# Patient Record
Sex: MALE | Race: ASIAN | NOT HISPANIC OR LATINO | URBAN - METROPOLITAN AREA
[De-identification: names, ages, dates, MRNs, and addresses within clinical notes are randomized per-mention and may not be internally consistent; named-entity substitution may affect disease eponyms.]

---

## 2023-07-14 ENCOUNTER — EMERGENCY (EMERGENCY)
Facility: HOSPITAL | Age: 48
LOS: 1 days | Discharge: HOME | End: 2023-07-14
Attending: STUDENT IN AN ORGANIZED HEALTH CARE EDUCATION/TRAINING PROGRAM
Payer: COMMERCIAL

## 2023-07-14 VITALS
TEMPERATURE: 99 F | SYSTOLIC BLOOD PRESSURE: 118 MMHG | RESPIRATION RATE: 20 BRPM | HEIGHT: 70 IN | DIASTOLIC BLOOD PRESSURE: 87 MMHG | WEIGHT: 179.9 LBS | HEART RATE: 102 BPM | OXYGEN SATURATION: 98 %

## 2023-07-14 VITALS
TEMPERATURE: 98 F | RESPIRATION RATE: 18 BRPM | HEART RATE: 91 BPM | DIASTOLIC BLOOD PRESSURE: 64 MMHG | SYSTOLIC BLOOD PRESSURE: 112 MMHG | OXYGEN SATURATION: 97 %

## 2023-07-14 LAB
ACETONE SERPL-MCNC: NEGATIVE — SIGNIFICANT CHANGE UP
ALBUMIN SERPL ELPH-MCNC: 3.9 G/DL — SIGNIFICANT CHANGE UP (ref 3.3–5)
ALP SERPL-CCNC: 49 U/L — SIGNIFICANT CHANGE UP (ref 30–120)
ALT FLD-CCNC: 41 U/L DA — SIGNIFICANT CHANGE UP (ref 10–60)
ANION GAP SERPL CALC-SCNC: 15 MMOL/L — SIGNIFICANT CHANGE UP (ref 5–17)
APPEARANCE UR: CLEAR — SIGNIFICANT CHANGE UP
AST SERPL-CCNC: 52 U/L — HIGH (ref 10–40)
BACTERIA # UR AUTO: ABNORMAL /HPF
BASOPHILS # BLD AUTO: 0 K/UL — SIGNIFICANT CHANGE UP (ref 0–0.2)
BASOPHILS NFR BLD AUTO: 0 % — SIGNIFICANT CHANGE UP (ref 0–2)
BILIRUB SERPL-MCNC: 1.9 MG/DL — HIGH (ref 0.2–1.2)
BILIRUB UR-MCNC: ABNORMAL
BUN SERPL-MCNC: 16 MG/DL — SIGNIFICANT CHANGE UP (ref 7–23)
CALCIUM SERPL-MCNC: 9.3 MG/DL — SIGNIFICANT CHANGE UP (ref 8.4–10.5)
CHLORIDE SERPL-SCNC: 96 MMOL/L — SIGNIFICANT CHANGE UP (ref 96–108)
CO2 SERPL-SCNC: 24 MMOL/L — SIGNIFICANT CHANGE UP (ref 22–31)
COLOR SPEC: SIGNIFICANT CHANGE UP
COMMENT - URINE: SIGNIFICANT CHANGE UP
CREAT SERPL-MCNC: 1.11 MG/DL — SIGNIFICANT CHANGE UP (ref 0.5–1.3)
DIFF PNL FLD: NEGATIVE — SIGNIFICANT CHANGE UP
EGFR: 81 ML/MIN/1.73M2 — SIGNIFICANT CHANGE UP
EOSINOPHIL # BLD AUTO: 0 K/UL — SIGNIFICANT CHANGE UP (ref 0–0.5)
EOSINOPHIL NFR BLD AUTO: 0 % — SIGNIFICANT CHANGE UP (ref 0–6)
EPI CELLS # UR: PRESENT
GLUCOSE SERPL-MCNC: 221 MG/DL — HIGH (ref 70–99)
GLUCOSE UR QL: >=1000 MG/DL
HCT VFR BLD CALC: 48.1 % — SIGNIFICANT CHANGE UP (ref 39–50)
HGB BLD-MCNC: 16.8 G/DL — SIGNIFICANT CHANGE UP (ref 13–17)
KETONES UR-MCNC: 40 MG/DL
LEUKOCYTE ESTERASE UR-ACNC: NEGATIVE — SIGNIFICANT CHANGE UP
LIDOCAIN IGE QN: 31 U/L — LOW (ref 73–393)
LYMPHOCYTES # BLD AUTO: 2.32 K/UL — SIGNIFICANT CHANGE UP (ref 1–3.3)
LYMPHOCYTES # BLD AUTO: 24 % — SIGNIFICANT CHANGE UP (ref 13–44)
MAGNESIUM SERPL-MCNC: 2.1 MG/DL — SIGNIFICANT CHANGE UP (ref 1.6–2.6)
MANUAL SMEAR VERIFICATION: SIGNIFICANT CHANGE UP
MCHC RBC-ENTMCNC: 29.8 PG — SIGNIFICANT CHANGE UP (ref 27–34)
MCHC RBC-ENTMCNC: 34.9 GM/DL — SIGNIFICANT CHANGE UP (ref 32–36)
MCV RBC AUTO: 85.4 FL — SIGNIFICANT CHANGE UP (ref 80–100)
MONOCYTES # BLD AUTO: 0.29 K/UL — SIGNIFICANT CHANGE UP (ref 0–0.9)
MONOCYTES NFR BLD AUTO: 3 % — SIGNIFICANT CHANGE UP (ref 2–14)
NEUTROPHILS # BLD AUTO: 7.06 K/UL — SIGNIFICANT CHANGE UP (ref 1.8–7.4)
NEUTROPHILS NFR BLD AUTO: 72 % — SIGNIFICANT CHANGE UP (ref 43–77)
NEUTS BAND # BLD: 1 % — SIGNIFICANT CHANGE UP (ref 0–8)
NITRITE UR-MCNC: NEGATIVE — SIGNIFICANT CHANGE UP
NRBC # BLD: 0 — SIGNIFICANT CHANGE UP
NRBC # BLD: SIGNIFICANT CHANGE UP /100 WBCS (ref 0–0)
PH UR: 5.5 — SIGNIFICANT CHANGE UP (ref 5–8)
PLAT MORPH BLD: NORMAL — SIGNIFICANT CHANGE UP
PLATELET # BLD AUTO: 206 K/UL — SIGNIFICANT CHANGE UP (ref 150–400)
PLATELET COUNT - ESTIMATE: NORMAL — SIGNIFICANT CHANGE UP
POTASSIUM SERPL-MCNC: 3.1 MMOL/L — LOW (ref 3.5–5.3)
POTASSIUM SERPL-SCNC: 3.1 MMOL/L — LOW (ref 3.5–5.3)
PROT SERPL-MCNC: 8.2 G/DL — SIGNIFICANT CHANGE UP (ref 6–8.3)
PROT UR-MCNC: 30 MG/DL
RBC # BLD: 5.63 M/UL — SIGNIFICANT CHANGE UP (ref 4.2–5.8)
RBC # FLD: 12.2 % — SIGNIFICANT CHANGE UP (ref 10.3–14.5)
RBC BLD AUTO: NORMAL — SIGNIFICANT CHANGE UP
RBC CASTS # UR COMP ASSIST: 1 /HPF — SIGNIFICANT CHANGE UP (ref 0–4)
SODIUM SERPL-SCNC: 135 MMOL/L — SIGNIFICANT CHANGE UP (ref 135–145)
SP GR SPEC: 1.04 — HIGH (ref 1–1.03)
TROPONIN I, HIGH SENSITIVITY RESULT: 15.5 NG/L — SIGNIFICANT CHANGE UP
UROBILINOGEN FLD QL: 1 MG/DL — SIGNIFICANT CHANGE UP (ref 0.2–1)
WBC # BLD: 9.67 K/UL — SIGNIFICANT CHANGE UP (ref 3.8–10.5)
WBC # FLD AUTO: 9.67 K/UL — SIGNIFICANT CHANGE UP (ref 3.8–10.5)
WBC UR QL: 2 /HPF — SIGNIFICANT CHANGE UP (ref 0–5)

## 2023-07-14 PROCEDURE — 93010 ELECTROCARDIOGRAM REPORT: CPT

## 2023-07-14 PROCEDURE — 84484 ASSAY OF TROPONIN QUANT: CPT

## 2023-07-14 PROCEDURE — 81001 URINALYSIS AUTO W/SCOPE: CPT

## 2023-07-14 PROCEDURE — 82803 BLOOD GASES ANY COMBINATION: CPT

## 2023-07-14 PROCEDURE — 99285 EMERGENCY DEPT VISIT HI MDM: CPT | Mod: 25

## 2023-07-14 PROCEDURE — 36415 COLL VENOUS BLD VENIPUNCTURE: CPT

## 2023-07-14 PROCEDURE — 71045 X-RAY EXAM CHEST 1 VIEW: CPT | Mod: 26

## 2023-07-14 PROCEDURE — 83690 ASSAY OF LIPASE: CPT

## 2023-07-14 PROCEDURE — 93005 ELECTROCARDIOGRAM TRACING: CPT

## 2023-07-14 PROCEDURE — 85025 COMPLETE CBC W/AUTO DIFF WBC: CPT

## 2023-07-14 PROCEDURE — 82009 KETONE BODYS QUAL: CPT

## 2023-07-14 PROCEDURE — 96374 THER/PROPH/DIAG INJ IV PUSH: CPT

## 2023-07-14 PROCEDURE — 99285 EMERGENCY DEPT VISIT HI MDM: CPT

## 2023-07-14 PROCEDURE — 71045 X-RAY EXAM CHEST 1 VIEW: CPT

## 2023-07-14 PROCEDURE — 80053 COMPREHEN METABOLIC PANEL: CPT

## 2023-07-14 PROCEDURE — 82962 GLUCOSE BLOOD TEST: CPT

## 2023-07-14 PROCEDURE — 83735 ASSAY OF MAGNESIUM: CPT

## 2023-07-14 RX ORDER — POTASSIUM CHLORIDE 20 MEQ
40 PACKET (EA) ORAL ONCE
Refills: 0 | Status: COMPLETED | OUTPATIENT
Start: 2023-07-14 | End: 2023-07-14

## 2023-07-14 RX ORDER — ONDANSETRON 8 MG/1
4 TABLET, FILM COATED ORAL ONCE
Refills: 0 | Status: COMPLETED | OUTPATIENT
Start: 2023-07-14 | End: 2023-07-14

## 2023-07-14 RX ORDER — SODIUM CHLORIDE 9 MG/ML
1000 INJECTION INTRAMUSCULAR; INTRAVENOUS; SUBCUTANEOUS ONCE
Refills: 0 | Status: COMPLETED | OUTPATIENT
Start: 2023-07-14 | End: 2023-07-14

## 2023-07-14 RX ADMIN — SODIUM CHLORIDE 1000 MILLILITER(S): 9 INJECTION INTRAMUSCULAR; INTRAVENOUS; SUBCUTANEOUS at 19:30

## 2023-07-14 RX ADMIN — Medication 40 MILLIEQUIVALENT(S): at 19:59

## 2023-07-14 RX ADMIN — ONDANSETRON 4 MILLIGRAM(S): 8 TABLET, FILM COATED ORAL at 18:51

## 2023-07-14 RX ADMIN — SODIUM CHLORIDE 1000 MILLILITER(S): 9 INJECTION INTRAMUSCULAR; INTRAVENOUS; SUBCUTANEOUS at 18:50

## 2023-07-14 NOTE — ED PROVIDER NOTE - CARE PROVIDER_API CALL
JULIA WOMACK  [address] [place] [address]  Phone: [phone]  Fax: [phone]  Follow Up Time: 1-3 Days

## 2023-07-14 NOTE — ED PROVIDER NOTE - NS ED ATTENDING STATEMENT MOD
This was a shared visit with the JOCY. I reviewed and verified the documentation and independently performed the documented:

## 2023-07-14 NOTE — ED PROVIDER NOTE - NSFOLLOWUPINSTRUCTIONS_ED_ALL_ED_FT
Drink plenty of fluids  Take insulin as previously prescribed  Have close follow-up with primary care doctor    Hyperglycemia  Hyperglycemia is when the sugar (glucose) level in your blood is too high. High blood sugar can happen to people who have or do not have diabetes. High blood sugar can happen quickly. It can be an emergency.    What are the causes?  If you have diabetes, high blood sugar may be caused by:  Medicines that increase blood sugar or affect your control of diabetes.  Getting less physical activity.  Overeating.  Being sick or injured or having an infection.  Having surgery.  Stress.  Not giving yourself enough insulin (if you are taking it).  You may have high blood sugar because you have diabetes that has not been diagnosed yet.    If you do not have diabetes, high blood sugar may be caused by:  Certain medicines.  Stress.  A bad illness.  An infection.  Having surgery.  Diseases of the pancreas.  What increases the risk?  This condition is more likely to develop in people who have risk factors for diabetes, such as:  Having a family member with diabetes.  Certain conditions in which the body's defense system (immune system) attacks itself. These are called autoimmune disorders.  Being overweight.  Not being active.  Having a condition called insulin resistance.  Having a history of:  Prediabetes.  Diabetes when pregnant.  Polycystic ovarian syndrome (PCOS).  What are the signs or symptoms?  This condition may not cause symptoms. If you do have symptoms, they may include:  Feeling more thirsty than normal.  Needing to pee (urinate) more often than normal.  Hunger.  Feeling very tired.  Blurry eyesight (vision).  You may get other symptoms as the condition gets worse, such as:  Dry mouth.  Pain in your belly (abdomen).  Not being hungry (loss of appetite).  Breath that smells fruity.  Weakness.  Weight loss that is not planned.  A tingling or numb feeling in your hands or feet.  A headache.  Cuts or bruises that heal slowly.  How is this treated?  Treatment depends on the cause of your condition. Treatment may include:  Taking medicine to control your blood sugar levels.  Changing your medicine or dosage if you take insulin or other diabetes medicines.  Lifestyle changes. These may include:  Exercising more.  Eating healthier foods.  Losing weight.  Treating an illness or infection.  Checking your blood sugar more often.  Stopping or reducing steroid medicines.  If your condition gets very bad, you will need to be treated in the hospital.    Follow these instructions at home:  General instructions    Take over-the-counter and prescription medicines only as told by your doctor.  Do not smoke or use any products that contain nicotine or tobacco. If you need help quitting, ask your doctor.  If you drink alcohol:  Limit how much you have to:  0–1 drink a day for women who are not pregnant.  0–2 drinks a day for men.  Know how much alcohol is in a drink. In the [place] one drink equals one 12 oz bottle of beer (355 mL), one 5 oz glass of wine (148 mL), or one 1½ oz glass of hard liquor (44 mL).  Manage stress. If you need help with this, ask your doctor.  Do exercises as told by your doctor.  Keep all follow-up visits.  Eating and drinking      Stay at a healthy weight.  Make sure you drink enough fluid when you:  Exercise.  Get sick.  Are in hot temperatures.  Drink enough fluid to keep your pee (urine) pale yellow.  If you have diabetes:      Know the symptoms of high blood sugar.  Follow your diabetes management plan as told by your doctor. Make sure you:  Take insulin and medicines as told.  Follow your exercise plan.  Follow your meal plan. Eat on time. Do not skip meals.  Check your blood sugar as often as told. Make sure you check before and after exercise. If you exercise longer or in a different way, check your blood sugar more often.  Follow your sick day plan whenever you cannot eat or drink normally. Make this plan ahead of time with your doctor.  Share your diabetes management plan with people in your workplace, school, and household.  Check your pee for ketones when you are ill and as told by your doctor.  Carry a card or wear jewelry that says that you have diabetes.  Where to find more information  American Diabetes Association: www.diabetes.org    Contact a doctor if:  Your blood sugar level is at or above 240 mg/dL (13.3 mmol/L) for 2 days in a row.  You have problems keeping your blood sugar in your target range.  You have high blood pressure often.  You have signs of illness, such as:  Feeling like you may vomit (feeling nauseous).  Vomiting.  A fever.  Get help right away if:  Your blood sugar monitor reads "high" even when you are taking insulin.  You have trouble breathing.  You have a change in how you think, feel, or act (mental status).  You feel like you may vomit, and the feeling does not go away.  You cannot stop vomiting.  These symptoms may be an emergency. Get medical help right away. Call your local emergency services (911 in the [place]  Do not wait to see if the symptoms will go away.  Do not drive yourself to the hospital.  Summary  Hyperglycemia is when the sugar (glucose) level in your blood is too high.  High blood sugar can happen to people who have or do not have diabetes.  Make sure you drink enough fluids and follow your meal plan. Exercise as often as told by your doctor.  Contact your doctor if you have problems keeping your blood sugar in your target range.  This information is not intended to replace advice given to you by your health care provider. Make sure you discuss any questions you have with your health care provider.

## 2023-07-14 NOTE — ED PROVIDER NOTE - CLINICAL SUMMARY MEDICAL DECISION MAKING FREE TEXT BOX
I, Prasanth Mendoza MD, have seen and examined the patient on the date of service.  I agree with the JOCY's assessment as written, with exceptions or additions as noted below or in a separate note.  Patient with past medical history of insulin-dependent diabetes, diabetes type 2 is presenting with concern for hyperglycemia and overall not feeling well.  States that he stopped taking his insulin for the past couple weeks as he thought his sugars were better controlled.  Took Lantus today prior to arrival.  On exam he has generalized abdominal tenderness but no focal area of tenderness.  Concern for DKA versus hyperosmolar hyperglycemic nonketotic syndrome.  Possible hyperglycemia without other syndrome.  Will check troponin for atypical ACS.  Less likely pancreatitis or biliary pathology.  Plan on lab work, imaging, IV fluids

## 2023-07-14 NOTE — ED ADULT NURSE NOTE - OBJECTIVE STATEMENT
umbilical pain and nausea and vomiting x 1 today. denies fever ro chills  c/o loss of appetite. does not know meds states gets at [facility] but called but no record of him being there

## 2023-07-14 NOTE — ED PROVIDER NOTE - PROGRESS NOTE DETAILS
Patient stable.  Overall symptoms much improved.  Nausea resolved.  No abdominal tenderness on repeat exam. no chest pain.   Taking p.o. fluids.  Potassium 3.1, given oral potassium in emergency room.  No evidence of DKA.  Discussed with patient will need to restart taking insulin as prescribed.  States he has insulin at home.  Will have close follow-up with primary care doctor.

## 2023-07-14 NOTE — ED PROVIDER NOTE - PATIENT PORTAL LINK FT
You can access the FollowMyHealth Patient Portal offered by [facility] by registering at the following website: [email or web address]. By joining [facility]’s FollowMyHealth portal, you will also be able to view your health information using other applications (apps) compatible with our system.

## 2023-07-14 NOTE — ED PROVIDER NOTE - EKG ADDITIONAL INFORMATION FREE TEXT
My personal interpretation is rate 100, normal sinus rhythm, leftward axis, QTc 474, no ST segment elevation.

## 2023-07-14 NOTE — ED PROVIDER NOTE - OBJECTIVE STATEMENT
48-year-old male with history of type 2 diabetes, insulin-dependent presents emergency room with some abdominal discomfort, nausea, and vomiting.  Patient states he stopped his insulin 2 weeks ago because he thought his sugars were under control and did not need his medication anymore.  Started to develop some abdominal discomfort last night with some nausea today.  Vomited 1 time today.  Slight chest discomfort since this morning.  No shortness of breath.  No fevers or recent illnesses.  No urinary complaints or flank pain. patient concerned about DKA. no history of DKA in the past   PCP Ma ([place])

## 2023-07-14 NOTE — ED ADULT NURSE NOTE - NSFALLUNIVINTERV_ED_ALL_ED
Bed/Stretcher in lowest position, wheels locked, appropriate side rails in place/Call bell, personal items and telephone in reach/Instruct patient to call for assistance before getting out of bed/chair/stretcher/Non-slip footwear applied when patient is off stretcher/[place] to call system/Physically safe environment - no spills, clutter or unnecessary equipment/Purposeful proactive rounding/Room/bathroom lighting operational, light cord in reach

## 2023-07-14 NOTE — ED PROVIDER NOTE - DIFFERENTIAL DIAGNOSIS
Differential Diagnosis Differentials include but not limited to hyperglycemia, DKA, electrolyte abnormality, dehydration, pancreatitis, ACS, infection

## 2023-07-14 NOTE — ED ADULT TRIAGE NOTE - CHIEF COMPLAINT QUOTE
" My blood sugar is high, I have stomach pain, I am very nauseous, I have been vomiting, I forgot to take my Insulin x 2 weeks "

## 2023-07-15 ENCOUNTER — INPATIENT (INPATIENT)
Facility: HOSPITAL | Age: 48
LOS: 11 days | Discharge: HOME | DRG: 389 | End: 2023-07-27
Attending: FAMILY MEDICINE | Admitting: FAMILY MEDICINE
Payer: COMMERCIAL

## 2023-07-15 VITALS
RESPIRATION RATE: 18 BRPM | SYSTOLIC BLOOD PRESSURE: 104 MMHG | WEIGHT: 179.9 LBS | TEMPERATURE: 101 F | OXYGEN SATURATION: 99 % | DIASTOLIC BLOOD PRESSURE: 72 MMHG | HEART RATE: 100 BPM | HEIGHT: 70 IN

## 2023-07-15 DIAGNOSIS — K52.9 NONINFECTIVE GASTROENTERITIS AND COLITIS, UNSPECIFIED: ICD-10-CM

## 2023-07-15 LAB
ALBUMIN SERPL ELPH-MCNC: 2.9 G/DL — LOW (ref 3.3–5)
ALP SERPL-CCNC: 34 U/L — SIGNIFICANT CHANGE UP (ref 30–120)
ALT FLD-CCNC: 31 U/L DA — SIGNIFICANT CHANGE UP (ref 10–60)
ANION GAP SERPL CALC-SCNC: 13 MMOL/L — SIGNIFICANT CHANGE UP (ref 5–17)
APPEARANCE UR: CLEAR — SIGNIFICANT CHANGE UP
AST SERPL-CCNC: 36 U/L — SIGNIFICANT CHANGE UP (ref 10–40)
BACTERIA # UR AUTO: ABNORMAL /HPF
BASOPHILS # BLD AUTO: 0 K/UL — SIGNIFICANT CHANGE UP (ref 0–0.2)
BASOPHILS # BLD AUTO: 0 K/UL — SIGNIFICANT CHANGE UP (ref 0–0.2)
BASOPHILS NFR BLD AUTO: 0 % — SIGNIFICANT CHANGE UP (ref 0–2)
BASOPHILS NFR BLD AUTO: 0 % — SIGNIFICANT CHANGE UP (ref 0–2)
BILIRUB SERPL-MCNC: 1.3 MG/DL — HIGH (ref 0.2–1.2)
BILIRUB UR-MCNC: NEGATIVE — SIGNIFICANT CHANGE UP
BUN SERPL-MCNC: 16 MG/DL — SIGNIFICANT CHANGE UP (ref 7–23)
CALCIUM SERPL-MCNC: 8.5 MG/DL — SIGNIFICANT CHANGE UP (ref 8.4–10.5)
CHLORIDE SERPL-SCNC: 101 MMOL/L — SIGNIFICANT CHANGE UP (ref 96–108)
CO2 SERPL-SCNC: 23 MMOL/L — SIGNIFICANT CHANGE UP (ref 22–31)
COLOR SPEC: YELLOW — SIGNIFICANT CHANGE UP
COMMENT - URINE: SIGNIFICANT CHANGE UP
CREAT SERPL-MCNC: 0.79 MG/DL — SIGNIFICANT CHANGE UP (ref 0.5–1.3)
DIFF PNL FLD: NEGATIVE — SIGNIFICANT CHANGE UP
EGFR: 109 ML/MIN/1.73M2 — SIGNIFICANT CHANGE UP
EOSINOPHIL # BLD AUTO: 0 K/UL — SIGNIFICANT CHANGE UP (ref 0–0.5)
EOSINOPHIL # BLD AUTO: 0 K/UL — SIGNIFICANT CHANGE UP (ref 0–0.5)
EOSINOPHIL NFR BLD AUTO: 0 % — SIGNIFICANT CHANGE UP (ref 0–6)
EOSINOPHIL NFR BLD AUTO: 0 % — SIGNIFICANT CHANGE UP (ref 0–6)
EPI CELLS # UR: PRESENT
GLUCOSE SERPL-MCNC: 188 MG/DL — HIGH (ref 70–99)
GLUCOSE UR QL: >=1000 MG/DL
HCT VFR BLD CALC: 35.3 % — LOW (ref 39–50)
HCT VFR BLD CALC: 44.5 % — SIGNIFICANT CHANGE UP (ref 39–50)
HGB BLD-MCNC: 12.4 G/DL — LOW (ref 13–17)
HGB BLD-MCNC: 15.7 G/DL — SIGNIFICANT CHANGE UP (ref 13–17)
KETONES UR-MCNC: 80 MG/DL
LACTATE SERPL-SCNC: 1.6 MMOL/L — SIGNIFICANT CHANGE UP (ref 0.7–2)
LACTATE SERPL-SCNC: 2.4 MMOL/L — HIGH (ref 0.7–2)
LEUKOCYTE ESTERASE UR-ACNC: NEGATIVE — SIGNIFICANT CHANGE UP
LYMPHOCYTES # BLD AUTO: 1.35 K/UL — SIGNIFICANT CHANGE UP (ref 1–3.3)
LYMPHOCYTES # BLD AUTO: 1.6 K/UL — SIGNIFICANT CHANGE UP (ref 1–3.3)
LYMPHOCYTES # BLD AUTO: 34 % — SIGNIFICANT CHANGE UP (ref 13–44)
LYMPHOCYTES # BLD AUTO: 39 % — SIGNIFICANT CHANGE UP (ref 13–44)
MANUAL SMEAR VERIFICATION: SIGNIFICANT CHANGE UP
MANUAL SMEAR VERIFICATION: SIGNIFICANT CHANGE UP
MCHC RBC-ENTMCNC: 30.6 PG — SIGNIFICANT CHANGE UP (ref 27–34)
MCHC RBC-ENTMCNC: 30.7 PG — SIGNIFICANT CHANGE UP (ref 27–34)
MCHC RBC-ENTMCNC: 35.1 GM/DL — SIGNIFICANT CHANGE UP (ref 32–36)
MCHC RBC-ENTMCNC: 35.3 GM/DL — SIGNIFICANT CHANGE UP (ref 32–36)
MCV RBC AUTO: 86.7 FL — SIGNIFICANT CHANGE UP (ref 80–100)
MCV RBC AUTO: 87.4 FL — SIGNIFICANT CHANGE UP (ref 80–100)
MONOCYTES # BLD AUTO: 0.28 K/UL — SIGNIFICANT CHANGE UP (ref 0–0.9)
MONOCYTES # BLD AUTO: 0.28 K/UL — SIGNIFICANT CHANGE UP (ref 0–0.9)
MONOCYTES NFR BLD AUTO: 6 % — SIGNIFICANT CHANGE UP (ref 2–14)
MONOCYTES NFR BLD AUTO: 8 % — SIGNIFICANT CHANGE UP (ref 2–14)
NEUTROPHILS # BLD AUTO: 1.83 K/UL — SIGNIFICANT CHANGE UP (ref 1.8–7.4)
NEUTROPHILS # BLD AUTO: 2.77 K/UL — SIGNIFICANT CHANGE UP (ref 1.8–7.4)
NEUTROPHILS NFR BLD AUTO: 28 % — LOW (ref 43–77)
NEUTROPHILS NFR BLD AUTO: 41 % — LOW (ref 43–77)
NEUTS BAND # BLD: 18 % — HIGH (ref 0–8)
NEUTS BAND # BLD: 25 % — HIGH (ref 0–8)
NITRITE UR-MCNC: NEGATIVE — SIGNIFICANT CHANGE UP
NRBC # BLD: 0 — SIGNIFICANT CHANGE UP
NRBC # BLD: 0 — SIGNIFICANT CHANGE UP
PH UR: 5 — SIGNIFICANT CHANGE UP (ref 5–8)
PLAT MORPH BLD: NORMAL — SIGNIFICANT CHANGE UP
PLAT MORPH BLD: NORMAL — SIGNIFICANT CHANGE UP
PLATELET # BLD AUTO: 156 K/UL — SIGNIFICANT CHANGE UP (ref 150–400)
PLATELET # BLD AUTO: 193 K/UL — SIGNIFICANT CHANGE UP (ref 150–400)
POTASSIUM SERPL-MCNC: 3.4 MMOL/L — LOW (ref 3.5–5.3)
POTASSIUM SERPL-SCNC: 3.4 MMOL/L — LOW (ref 3.5–5.3)
PROT SERPL-MCNC: 6.5 G/DL — SIGNIFICANT CHANGE UP (ref 6–8.3)
PROT UR-MCNC: 30 MG/DL
RAPID RVP RESULT: SIGNIFICANT CHANGE UP
RBC # BLD: 4.04 M/UL — LOW (ref 4.2–5.8)
RBC # BLD: 5.13 M/UL — SIGNIFICANT CHANGE UP (ref 4.2–5.8)
RBC # FLD: 12.2 % — SIGNIFICANT CHANGE UP (ref 10.3–14.5)
RBC # FLD: 12.3 % — SIGNIFICANT CHANGE UP (ref 10.3–14.5)
RBC BLD AUTO: NORMAL — SIGNIFICANT CHANGE UP
RBC BLD AUTO: NORMAL — SIGNIFICANT CHANGE UP
RBC CASTS # UR COMP ASSIST: 1 /HPF — SIGNIFICANT CHANGE UP (ref 0–4)
SARS-COV-2 RNA SPEC QL NAA+PROBE: SIGNIFICANT CHANGE UP
SODIUM SERPL-SCNC: 137 MMOL/L — SIGNIFICANT CHANGE UP (ref 135–145)
SP GR SPEC: 1.08 — HIGH (ref 1–1.03)
UROBILINOGEN FLD QL: 1 MG/DL — SIGNIFICANT CHANGE UP (ref 0.2–1)
VARIANT LYMPHS # BLD: 1 % — SIGNIFICANT CHANGE UP (ref 0–6)
WBC # BLD: 3.45 K/UL — LOW (ref 3.8–10.5)
WBC # BLD: 4.7 K/UL — SIGNIFICANT CHANGE UP (ref 3.8–10.5)
WBC # FLD AUTO: 3.45 K/UL — LOW (ref 3.8–10.5)
WBC # FLD AUTO: 4.7 K/UL — SIGNIFICANT CHANGE UP (ref 3.8–10.5)
WBC UR QL: 1 /HPF — SIGNIFICANT CHANGE UP (ref 0–5)

## 2023-07-15 PROCEDURE — 71045 X-RAY EXAM CHEST 1 VIEW: CPT | Mod: 26

## 2023-07-15 PROCEDURE — 99223 1ST HOSP IP/OBS HIGH 75: CPT

## 2023-07-15 PROCEDURE — 99285 EMERGENCY DEPT VISIT HI MDM: CPT

## 2023-07-15 PROCEDURE — 93010 ELECTROCARDIOGRAM REPORT: CPT

## 2023-07-15 PROCEDURE — 74177 CT ABD & PELVIS W/CONTRAST: CPT | Mod: 26,MA

## 2023-07-15 RX ORDER — SODIUM CHLORIDE 9 MG/ML
1000 INJECTION INTRAMUSCULAR; INTRAVENOUS; SUBCUTANEOUS ONCE
Refills: 0 | Status: COMPLETED | OUTPATIENT
Start: 2023-07-15 | End: 2023-07-15

## 2023-07-15 RX ORDER — GLUCAGON INJECTION, SOLUTION 0.5 MG/.1ML
1 INJECTION, SOLUTION SUBCUTANEOUS ONCE
Refills: 0 | Status: DISCONTINUED | OUTPATIENT
Start: 2023-07-15 | End: 2023-07-27

## 2023-07-15 RX ORDER — ACETAMINOPHEN 500 MG
1000 TABLET ORAL ONCE
Refills: 0 | Status: COMPLETED | OUTPATIENT
Start: 2023-07-15 | End: 2023-07-15

## 2023-07-15 RX ORDER — PIPERACILLIN AND TAZOBACTAM 4; .5 G/20ML; G/20ML
3.38 INJECTION, POWDER, LYOPHILIZED, FOR SOLUTION INTRAVENOUS ONCE
Refills: 0 | Status: COMPLETED | OUTPATIENT
Start: 2023-07-15 | End: 2023-07-15

## 2023-07-15 RX ORDER — INSULIN LISPRO 100/ML
VIAL (ML) SUBCUTANEOUS EVERY 6 HOURS
Refills: 0 | Status: DISCONTINUED | OUTPATIENT
Start: 2023-07-15 | End: 2023-07-16

## 2023-07-15 RX ORDER — DEXTROSE 50 % IN WATER 50 %
15 SYRINGE (ML) INTRAVENOUS ONCE
Refills: 0 | Status: DISCONTINUED | OUTPATIENT
Start: 2023-07-15 | End: 2023-07-27

## 2023-07-15 RX ORDER — SODIUM CHLORIDE 9 MG/ML
1000 INJECTION, SOLUTION INTRAVENOUS
Refills: 0 | Status: DISCONTINUED | OUTPATIENT
Start: 2023-07-15 | End: 2023-07-27

## 2023-07-15 RX ORDER — ACETAMINOPHEN 500 MG
650 TABLET ORAL EVERY 6 HOURS
Refills: 0 | Status: DISCONTINUED | OUTPATIENT
Start: 2023-07-15 | End: 2023-07-15

## 2023-07-15 RX ORDER — ONDANSETRON 8 MG/1
4 TABLET, FILM COATED ORAL ONCE
Refills: 0 | Status: COMPLETED | OUTPATIENT
Start: 2023-07-15 | End: 2023-07-15

## 2023-07-15 RX ORDER — DEXTROSE 50 % IN WATER 50 %
25 SYRINGE (ML) INTRAVENOUS ONCE
Refills: 0 | Status: DISCONTINUED | OUTPATIENT
Start: 2023-07-15 | End: 2023-07-27

## 2023-07-15 RX ORDER — PANTOPRAZOLE SODIUM 20 MG/1
40 TABLET, DELAYED RELEASE ORAL DAILY
Refills: 0 | Status: DISCONTINUED | OUTPATIENT
Start: 2023-07-15 | End: 2023-07-27

## 2023-07-15 RX ORDER — LANOLIN ALCOHOL/MO/W.PET/CERES
3 CREAM (GRAM) TOPICAL AT BEDTIME
Refills: 0 | Status: DISCONTINUED | OUTPATIENT
Start: 2023-07-15 | End: 2023-07-27

## 2023-07-15 RX ORDER — ACETAMINOPHEN 500 MG
650 TABLET ORAL EVERY 6 HOURS
Refills: 0 | Status: DISCONTINUED | OUTPATIENT
Start: 2023-07-15 | End: 2023-07-17

## 2023-07-15 RX ORDER — ONDANSETRON 8 MG/1
4 TABLET, FILM COATED ORAL EVERY 8 HOURS
Refills: 0 | Status: DISCONTINUED | OUTPATIENT
Start: 2023-07-15 | End: 2023-07-27

## 2023-07-15 RX ORDER — SODIUM CHLORIDE 9 MG/ML
1000 INJECTION, SOLUTION INTRAVENOUS
Refills: 0 | Status: DISCONTINUED | OUTPATIENT
Start: 2023-07-15 | End: 2023-07-18

## 2023-07-15 RX ORDER — INSULIN LISPRO 100/ML
VIAL (ML) SUBCUTANEOUS
Refills: 0 | Status: DISCONTINUED | OUTPATIENT
Start: 2023-07-15 | End: 2023-07-15

## 2023-07-15 RX ORDER — DEXTROSE 50 % IN WATER 50 %
12.5 SYRINGE (ML) INTRAVENOUS ONCE
Refills: 0 | Status: DISCONTINUED | OUTPATIENT
Start: 2023-07-15 | End: 2023-07-27

## 2023-07-15 RX ORDER — SODIUM CHLORIDE 9 MG/ML
2300 INJECTION INTRAMUSCULAR; INTRAVENOUS; SUBCUTANEOUS ONCE
Refills: 0 | Status: COMPLETED | OUTPATIENT
Start: 2023-07-15 | End: 2023-07-15

## 2023-07-15 RX ADMIN — Medication 1000 MILLIGRAM(S): at 13:20

## 2023-07-15 RX ADMIN — SODIUM CHLORIDE 1000 MILLILITER(S): 9 INJECTION INTRAMUSCULAR; INTRAVENOUS; SUBCUTANEOUS at 14:46

## 2023-07-15 RX ADMIN — SODIUM CHLORIDE 2300 MILLILITER(S): 9 INJECTION INTRAMUSCULAR; INTRAVENOUS; SUBCUTANEOUS at 14:00

## 2023-07-15 RX ADMIN — SODIUM CHLORIDE 2300 MILLILITER(S): 9 INJECTION INTRAMUSCULAR; INTRAVENOUS; SUBCUTANEOUS at 13:00

## 2023-07-15 RX ADMIN — PANTOPRAZOLE SODIUM 40 MILLIGRAM(S): 20 TABLET, DELAYED RELEASE ORAL at 19:09

## 2023-07-15 RX ADMIN — ONDANSETRON 4 MILLIGRAM(S): 8 TABLET, FILM COATED ORAL at 13:01

## 2023-07-15 RX ADMIN — SODIUM CHLORIDE 120 MILLILITER(S): 9 INJECTION, SOLUTION INTRAVENOUS at 19:09

## 2023-07-15 RX ADMIN — PIPERACILLIN AND TAZOBACTAM 200 GRAM(S): 4; .5 INJECTION, POWDER, LYOPHILIZED, FOR SOLUTION INTRAVENOUS at 14:47

## 2023-07-15 RX ADMIN — Medication 400 MILLIGRAM(S): at 13:00

## 2023-07-15 NOTE — ED PROVIDER NOTE - NS ED MD DISPO SPECIAL CONSIDERATION1
There is no distension. Palpations: Abdomen is soft. Tenderness: There is no rebound. Skin:     General: Skin is warm and dry. Neurological:      Mental Status: He is oriented to person, place, and time. Assessment:       Diagnosis Orders   1. Situational anxiety     2. Thyroid nodule  US HEAD NECK SOFT TISSUE THYROID         Plan:    vc  atarax  If doesn't work can try buspirone or benzo as last resort. Risk of cancer, thyroid US. Addendum failed atarax, sent buspirone. 2nd addendum ativan prn sent. Orders Placed This Encounter   Procedures    US HEAD NECK SOFT TISSUE THYROID     Standing Status:   Future     Standing Expiration Date:   4/6/2022     Orders Placed This Encounter   Medications    hydrOXYzine (ATARAX) 25 MG tablet     Sig: Take 1 tablet by mouth every 8 hours as needed for Anxiety     Dispense:  30 tablet     Refill:  0   Understands ATARAX can increase SI and to call immediately if this should occur. Sedation risk explained  Risks explained with atarax. If we go the benzodiazepine route  they can be addicting, higher risk of falls, overdose, death, etc  No driving  If anything should change or worsen call ASAP, don't wait for next scheduled appointment. Return in about 6 weeks (around 5/18/2021) for Chronic condition management/appointment, worsening symptoms, call ASAP for appointment.       Alex Maya MD
None

## 2023-07-15 NOTE — H&P ADULT - ASSESSMENT
#Enteritis  findings present on CT, unable to rule out partial SBO  surgery consulted, recommends medical management with NGT to suction  stool studies cx, O&P as per surgery  GI consulted, appreciate reccs  s/p zosyn in ED  will consider ID consult as per surgery given neutropenia    #Insulin-dependent diabetes  insulin sliding scale with glucose checks q6h while NPO  will plan to advance to diabetic diet when ready by surgery    #current smoker  declines nicotine patch    Diet: NPO  Fluids: LR  GI ppx: pantoprazole  DVT ppx: Ambulate

## 2023-07-15 NOTE — ED PROVIDER NOTE - CLINICAL SUMMARY MEDICAL DECISION MAKING FREE TEXT BOX
48-year-old male with past medical history of diabetes type 2 insulin-dependent coming in for worsening abdominal pain nausea vomiting diarrhea.  Patient was seen here yesterday Work-up negative for DKA, patient was feeling better and discharged home.  Patient having back woke worsening symptoms instantly noted to have fever. Pt denies any abdominal surgical hx.    Physical exam vital signs stable afebrile no distress.  Heart and lungs normal.  Abdomen firm diffusely tender no mass or HSM.  No rebound.  Extremities full range of motion intact no edema.  Impression is abdominal pain rule out diabetic versus colitis versus appendicitis.  Plan is labs CT abdomen IV fluids.  May need surgery/GI evaluation.

## 2023-07-15 NOTE — H&P ADULT - NSHPLABSRESULTS_GEN_ALL_CORE
Labs:                          12.4   3.45  )-----------( 156      ( 15 Jul 2023 15:29 )             35.3       07-15    137  |  101  |  16  ----------------------------<  188<H>  3.4<L>   |  23  |  0.79    Ca    8.5      15 Jul 2023 12:51  Mg     2.1         TPro  6.5  /  Alb  2.9<L>  /  TBili  1.3<H>  /  DBili  x   /  AST  36  /  ALT  31  /  AlkPhos  34  07-15          ABG - ( 2023 18:53 )  pH, Arterial: 7.46  pH, Blood: x     /  pCO2: 29    /  pO2: 51    / HCO3: 21    / Base Excess: -3.2  /  SaO2: 86.2                Urinalysis Basic - ( 15 Jul 2023 15:32 )    Color: Yellow / Appearance: Clear / S.081 / pH: x  Gluc: x / Ketone: 80 mg/dL  / Bili: Negative / Urobili: 1.0 mg/dL   Blood: x / Protein: 30 mg/dL / Nitrite: Negative   Leuk Esterase: Negative / RBC: 1 /HPF / WBC 1 /HPF   Sq Epi: x / Non Sq Epi: x / Bacteria: Occasional /HPF            Lactate Trend  07-15 @ 14:13 Lactate:1.6   07-15 @ 12:51 Lactate:2.4             CAPILLARY BLOOD GLUCOSE      POCT Blood Glucose.: 182 mg/dL (15 Jul 2023 12:24)

## 2023-07-15 NOTE — H&P ADULT - NSHPPHYSICALEXAM_GEN_ALL_CORE
GENERAL: patient appears well, no acute distress, appropriate behavior  EYES: sclera clear, no exudates, PERRLA  ENMT: moist mucous membranes, oropharynx clear without erythema, no exudates  NECK: supple, soft, no thyromegaly noted  LUNGS:  clear to auscultation,  no rales, wheezing or rhonchi appreciated  HEART: S1/S2, regular rate and rhythm, no murmurs noted, no lower extremity edema appreciated  GASTROINTESTINAL: abdomen is soft, mildly distended, diffusely tender, no guarding, loud bowel sounds, no palpable masses  INTEGUMENT: good skin turgor, warm, dry and intact, no lesions appreciated  MUSCULOSKELETAL: no clubbing or cyanosis, no obvious deformity  NEUROLOGIC: awake, alert, oriented x3, strength no obvious sensory deficits  PSYCHIATRIC: mood is good, affect is congruent, linear and logical thought process  HEME/LYMPH:  no obvious ecchymosis or petechiae

## 2023-07-15 NOTE — ED ADULT TRIAGE NOTE - WEIGHT IN LBS
179.8 [de-identified] : This is a patient of Dr. Wilson's office and is referred today for evaluation of abdominal pain.\par \par About 1 to 1.5 years ago, Rob developed acute gastrointestinal illness, suspected food poisoning, with abdominal pain, vomiting, and diarrhea and resolved in a few days or less.  He was then back to his usual state of health.  Since then about once per month on average, he has recurrent acute episodes of abdominal pain and diarrhea, nonbloody.  The abdominal pain and diarrhea will last 3-4 days, then resolve and he is fully back to his healthy baseline for the next month, until a new episode starts.  This cyclical pattern has been happening in a stereotypical way with each episode being nearly identical to the prior.  He does not have vomiting, but will have nausea during the few days of feeling unwell. \par \par Previous testing a few months ago included a normal CBC, CMP, CRP, celiac serology, fecal calprotectin, stool H pylori.  Test results reviewed in the exam room with patient today.  He has tried holistic treatments with vitamins and supplements and keeping a Mediterranean diet.  The episodes are unchanged or slightly better.  \par

## 2023-07-15 NOTE — ED PROVIDER NOTE - PROGRESS NOTE DETAILS
bandemia increased from 18 to 25 not feeling better ct showed enteritis findings dw Dr. Correa advised ng tube and admission for further testing zosyn given and SHAHLA sepulveda made aware

## 2023-07-15 NOTE — ED ADULT NURSE NOTE - LIVES WITH, PROFILE
Render Note In Bullet Format When Appropriate: No Show Applicator Variable?: Yes Consent: The patient's consent was obtained including but not limited to risks of crusting, scabbing, blistering, scarring, darker or lighter pigmentary change, recurrence, incomplete removal and infection. Detail Level: Detailed Number Of Freeze-Thaw Cycles: 2 freeze-thaw cycles Duration Of Freeze Thaw-Cycle (Seconds): 3 Post-Care Instructions: I reviewed with the patient in detail post-care instructions. Patient is to wear sunprotection, and avoid picking at any of the treated lesions. Pt may apply Vaseline to crusted or scabbing areas. spouse

## 2023-07-15 NOTE — CONSULT NOTE ADULT - ASSESSMENT
48M with DM presenting with symptoms c/w enteritis, possible early partial SBO  -NGT to LCWS, given dilated stomach on CT- possible early partial SBO/ileus 2/2 enteritis  -stool studies cx, O&P  -consider abx given fever and neutropenia, consider ID c/s  -GI consult   -no acute surgical intervention  -medical management of enteritis  -will follow  dw patient and all questions answered 48M with DM presenting with symptoms c/w enteritis, possible early partial SBO  Hyperlactatemia - now resolved s/p fluid resuscitation  Neutropenia- likely dilutional but may be a sign of infection given fever as well   Anemia- likely dilutional  -NGT to LCWS, given dilated stomach on CT- possible early partial SBO/ileus 2/2 enteritis  -NPO/IVF  -stool studies cx, O&P  -consider abx given fever and neutropenia, consider ID c/s  -GI consult   -no acute surgical intervention  -medical management of enteritis  -will follow  dw patient and all questions answered

## 2023-07-15 NOTE — PATIENT PROFILE ADULT - PACKS YRS CALCULATION
[FreeTextEntry8] : Patient presents for sick visit. She notes a sore throat and nighttime cough x 2 days. There is some discomfort when she swallows, felt chills on day one but no fevers. Denies any phlegm production, her cough is dry. Denies nasal congestion, sinus pressure, SOB, CP. Her son is also sick with similar symptoms.\par \par  20

## 2023-07-15 NOTE — H&P ADULT - NSHPREVIEWOFSYSTEMS_GEN_ALL_CORE
REVIEW OF SYSTEMS:  CONSTITUTIONAL: fever  EYES: No eye pain, visual disturbances, or discharge  ENMT:  No difficulty hearing, tinnitus, vertigo; No sinus or throat pain  NECK: No pain or stiffness  RESPIRATORY: No cough, wheezing, chills or hemoptysis; No shortness of breath  CARDIOVASCULAR: No chest pain, palpitations, dizziness, or leg swelling  GASTROINTESTINAL: abdominal pain. Nausea, vomiting; No diarrhea.  NEUROLOGICAL: No headaches, memory loss, loss of strength, numbness, or tremors  SKIN: No itching, burning, rashes, or lesions   MUSCULOSKELETAL: No joint pain or swelling; No muscle, back, or extremity pain  PSYCHIATRIC: No depression, anxiety, mood swings, or difficulty sleeping  HEME/LYMPH: No easy bruising, or bleeding gums  ALLERGY AND IMMUNOLOGIC: No hives or eczema

## 2023-07-15 NOTE — H&P ADULT - HISTORY OF PRESENT ILLNESS
Patient is a 49 yo M with PMH of IDDM who presented to ED yesterday with complaint of elevated glucose and nausea. He reported abdominal pain, fever, and an episode of vomiting. He reports he stopped his insulin 2 weeks ago as he says his sugars were under control and no longer needed it. He had a negative workup for DKA and was given oral potassium and was discharged after feeling better with instructions to resume his insulin and followup with his PCP. He returned today   returned today with reports of fever and worsening abdominal pain. Denies diarrhea. He reports he is a cigar smoker.     ED course: CBC shows bandemia increased from 18 to 25. CT showed enteritis and unable to rule out partial SBO. findings d/w Dr. Correa who advised NG tube and admission for further testing. Patient recieved lakhwinder and GI Dr. Schafer made aware.        PMH: IDDM  PSH: denies abdominal surgeries  Ax: ROB  PCP: Denae Hernandez

## 2023-07-15 NOTE — ED PROVIDER NOTE - OBJECTIVE STATEMENT
Patient is a 48-year-old male with past medical history of diabetes type 2 insulin-dependent coming in for worsening abdominal pain nausea vomiting diarrhea.  Patient was seen here yesterday doctor for DKA given negative patient was feeling better and discharged home.  Patient having back woke worsening symptoms instantly noted to have fever. Pt denies any abdominal surgical hx. Patient is a 48-year-old male with past medical history of diabetes type 2 insulin-dependent coming in for worsening abdominal pain nausea vomiting diarrhea.  Patient was seen here yesterday work up neg for DKA  patient was feeling better and discharged home.  Patient having back woke worsening symptoms instantly noted to have fever. Pt denies any abdominal surgical hx.

## 2023-07-15 NOTE — ED ADULT NURSE NOTE - OBJECTIVE STATEMENT
abd pain ruq and rlq with palpation abd mildly distended c/o nausea and vomiting denies diarrhea. unsure of color of stool. pt aaox3 skin warm and dry no resp distress lungs clear and equal b/l ascultation abd soft  tender + bs  seen in ED yesterday and casnnot remember insulin type or dose and called [facility] yesterday and told does not fill prescriptions there

## 2023-07-15 NOTE — ED ADULT NURSE REASSESSMENT NOTE - NS ED NURSE REASSESS COMMENT FT1
ngt draining bilious material with mild blood now states pain unaffected by ngt insertion. output total 200ml thus far
ngt inserted right nares with no difficulty placement checked and return of 50 cc bilious material awaiting cxr for placement. pt pending admission
telephone report given to SUDHAKAR Cabrera 1E
pt voiding well. pain slightly better. no vomiting since arrival. PA wants pt to have ngt. pt wants to go to bathroom first. pt now to be admitted
6

## 2023-07-16 LAB
A1C WITH ESTIMATED AVERAGE GLUCOSE RESULT: 8.9 % — HIGH (ref 4–5.6)
ANION GAP SERPL CALC-SCNC: 9 MMOL/L — SIGNIFICANT CHANGE UP (ref 5–17)
BUN SERPL-MCNC: 9 MG/DL — SIGNIFICANT CHANGE UP (ref 7–23)
CALCIUM SERPL-MCNC: 7.4 MG/DL — LOW (ref 8.4–10.5)
CHLORIDE SERPL-SCNC: 107 MMOL/L — SIGNIFICANT CHANGE UP (ref 96–108)
CO2 SERPL-SCNC: 24 MMOL/L — SIGNIFICANT CHANGE UP (ref 22–31)
CREAT SERPL-MCNC: 0.62 MG/DL — SIGNIFICANT CHANGE UP (ref 0.5–1.3)
CULTURE RESULTS: SIGNIFICANT CHANGE UP
EGFR: 117 ML/MIN/1.73M2 — SIGNIFICANT CHANGE UP
ESTIMATED AVERAGE GLUCOSE: 209 MG/DL — HIGH (ref 68–114)
GI PCR PANEL: SIGNIFICANT CHANGE UP
GLUCOSE BLDC GLUCOMTR-MCNC: 116 MG/DL — HIGH (ref 70–99)
GLUCOSE BLDC GLUCOMTR-MCNC: 117 MG/DL — HIGH (ref 70–99)
GLUCOSE BLDC GLUCOMTR-MCNC: 120 MG/DL — HIGH (ref 70–99)
GLUCOSE BLDC GLUCOMTR-MCNC: 130 MG/DL — HIGH (ref 70–99)
GLUCOSE BLDC GLUCOMTR-MCNC: 141 MG/DL — HIGH (ref 70–99)
GLUCOSE SERPL-MCNC: 119 MG/DL — HIGH (ref 70–99)
HCT VFR BLD CALC: 34.8 % — LOW (ref 39–50)
HGB BLD-MCNC: 12.2 G/DL — LOW (ref 13–17)
MAGNESIUM SERPL-MCNC: 1.8 MG/DL — SIGNIFICANT CHANGE UP (ref 1.6–2.6)
MCHC RBC-ENTMCNC: 30.2 PG — SIGNIFICANT CHANGE UP (ref 27–34)
MCHC RBC-ENTMCNC: 35.1 GM/DL — SIGNIFICANT CHANGE UP (ref 32–36)
MCV RBC AUTO: 86.1 FL — SIGNIFICANT CHANGE UP (ref 80–100)
NRBC # BLD: 0 /100 WBCS — SIGNIFICANT CHANGE UP (ref 0–0)
PLATELET # BLD AUTO: 164 K/UL — SIGNIFICANT CHANGE UP (ref 150–400)
POTASSIUM SERPL-MCNC: 3 MMOL/L — LOW (ref 3.5–5.3)
POTASSIUM SERPL-MCNC: 4.5 MMOL/L — SIGNIFICANT CHANGE UP (ref 3.5–5.3)
POTASSIUM SERPL-SCNC: 3 MMOL/L — LOW (ref 3.5–5.3)
POTASSIUM SERPL-SCNC: 4.5 MMOL/L — SIGNIFICANT CHANGE UP (ref 3.5–5.3)
RBC # BLD: 4.04 M/UL — LOW (ref 4.2–5.8)
RBC # FLD: 12.3 % — SIGNIFICANT CHANGE UP (ref 10.3–14.5)
SODIUM SERPL-SCNC: 140 MMOL/L — SIGNIFICANT CHANGE UP (ref 135–145)
SPECIMEN SOURCE: SIGNIFICANT CHANGE UP
WBC # BLD: 4.58 K/UL — SIGNIFICANT CHANGE UP (ref 3.8–10.5)
WBC # FLD AUTO: 4.58 K/UL — SIGNIFICANT CHANGE UP (ref 3.8–10.5)

## 2023-07-16 PROCEDURE — 99233 SBSQ HOSP IP/OBS HIGH 50: CPT

## 2023-07-16 RX ORDER — PIPERACILLIN AND TAZOBACTAM 4; .5 G/20ML; G/20ML
3.38 INJECTION, POWDER, LYOPHILIZED, FOR SOLUTION INTRAVENOUS EVERY 8 HOURS
Refills: 0 | Status: COMPLETED | OUTPATIENT
Start: 2023-07-16 | End: 2023-07-22

## 2023-07-16 RX ORDER — INSULIN LISPRO 100/ML
VIAL (ML) SUBCUTANEOUS
Refills: 0 | Status: DISCONTINUED | OUTPATIENT
Start: 2023-07-16 | End: 2023-07-27

## 2023-07-16 RX ORDER — PIPERACILLIN AND TAZOBACTAM 4; .5 G/20ML; G/20ML
3.38 INJECTION, POWDER, LYOPHILIZED, FOR SOLUTION INTRAVENOUS ONCE
Refills: 0 | Status: COMPLETED | OUTPATIENT
Start: 2023-07-16 | End: 2023-07-16

## 2023-07-16 RX ORDER — MAGNESIUM SULFATE 500 MG/ML
2 VIAL (ML) INJECTION ONCE
Refills: 0 | Status: COMPLETED | OUTPATIENT
Start: 2023-07-16 | End: 2023-07-16

## 2023-07-16 RX ORDER — GLUCAGON INJECTION, SOLUTION 0.5 MG/.1ML
1 INJECTION, SOLUTION SUBCUTANEOUS ONCE
Refills: 0 | Status: DISCONTINUED | OUTPATIENT
Start: 2023-07-16 | End: 2023-07-27

## 2023-07-16 RX ORDER — POTASSIUM CHLORIDE 20 MEQ
10 PACKET (EA) ORAL
Refills: 0 | Status: COMPLETED | OUTPATIENT
Start: 2023-07-16 | End: 2023-07-16

## 2023-07-16 RX ORDER — INSULIN LISPRO 100/ML
VIAL (ML) SUBCUTANEOUS AT BEDTIME
Refills: 0 | Status: DISCONTINUED | OUTPATIENT
Start: 2023-07-16 | End: 2023-07-27

## 2023-07-16 RX ADMIN — Medication 100 MILLIEQUIVALENT(S): at 09:59

## 2023-07-16 RX ADMIN — PIPERACILLIN AND TAZOBACTAM 25 GRAM(S): 4; .5 INJECTION, POWDER, LYOPHILIZED, FOR SOLUTION INTRAVENOUS at 21:43

## 2023-07-16 RX ADMIN — SODIUM CHLORIDE 120 MILLILITER(S): 9 INJECTION, SOLUTION INTRAVENOUS at 15:21

## 2023-07-16 RX ADMIN — PANTOPRAZOLE SODIUM 40 MILLIGRAM(S): 20 TABLET, DELAYED RELEASE ORAL at 12:13

## 2023-07-16 RX ADMIN — Medication 650 MILLIGRAM(S): at 02:15

## 2023-07-16 RX ADMIN — PIPERACILLIN AND TAZOBACTAM 25 GRAM(S): 4; .5 INJECTION, POWDER, LYOPHILIZED, FOR SOLUTION INTRAVENOUS at 15:20

## 2023-07-16 RX ADMIN — PIPERACILLIN AND TAZOBACTAM 200 GRAM(S): 4; .5 INJECTION, POWDER, LYOPHILIZED, FOR SOLUTION INTRAVENOUS at 09:59

## 2023-07-16 RX ADMIN — Medication 100 MILLIEQUIVALENT(S): at 11:19

## 2023-07-16 RX ADMIN — Medication 100 MILLIEQUIVALENT(S): at 08:52

## 2023-07-16 RX ADMIN — Medication 25 GRAM(S): at 08:53

## 2023-07-16 RX ADMIN — Medication 650 MILLIGRAM(S): at 01:14

## 2023-07-16 NOTE — CONSULT NOTE ADULT - ASSESSMENT
enteritis   abnormal CT    npo  s/p NGT  now on clears  agree with iv antibiotics  check gi pcr  may need CTE as outpatient once improved

## 2023-07-16 NOTE — PROGRESS NOTE ADULT - SUBJECTIVE AND OBJECTIVE BOX
Patient is a 48y old  Male who presents with a chief complaint of nausea/vomiting     INTERVAL HPI/OVERNIGHT EVENTS: Seen and examined at bedside. NGT in place. Still has nausea/abdominal discomfort. No chest pain, palpitation, sob     MEDICATIONS  (STANDING):  dextrose 5%. 1000 milliLiter(s) (100 mL/Hr) IV Continuous <Continuous>  dextrose 5%. 1000 milliLiter(s) (50 mL/Hr) IV Continuous <Continuous>  dextrose 50% Injectable 12.5 Gram(s) IV Push once  dextrose 50% Injectable 25 Gram(s) IV Push once  dextrose 50% Injectable 25 Gram(s) IV Push once  glucagon  Injectable 1 milliGRAM(s) IntraMuscular once  insulin lispro (ADMELOG) corrective regimen sliding scale   SubCutaneous every 6 hours  lactated ringers. 1000 milliLiter(s) (120 mL/Hr) IV Continuous <Continuous>  magnesium sulfate  IVPB 2 Gram(s) IV Intermittent once  pantoprazole  Injectable 40 milliGRAM(s) IV Push daily  potassium chloride  10 mEq/100 mL IVPB 10 milliEquivalent(s) IV Intermittent every 1 hour    MEDICATIONS  (PRN):  acetaminophen  Suppository .. 650 milliGRAM(s) Rectal every 6 hours PRN Temp greater or equal to 38C (100.4F), Mild Pain (1 - 3)  aluminum hydroxide/magnesium hydroxide/simethicone Suspension 30 milliLiter(s) Oral every 4 hours PRN Dyspepsia  dextrose Oral Gel 15 Gram(s) Oral once PRN Blood Glucose LESS THAN 70 milliGRAM(s)/deciliter  melatonin 3 milliGRAM(s) Oral at bedtime PRN Insomnia  ondansetron Injectable 4 milliGRAM(s) IV Push every 8 hours PRN Nausea and/or Vomiting      Allergies    No Known Allergies    Intolerances        REVIEW OF SYSTEMS:  All 10 systems reviewed and are negative except per HPI   Vital Signs Last 24 Hrs  T(C): 36.4 (16 Jul 2023 05:17), Max: 38.1 (15 Jul 2023 12:21)  T(F): 97.5 (16 Jul 2023 05:17), Max: 100.6 (16 Jul 2023 01:17)  HR: 63 (16 Jul 2023 05:17) (63 - 100)  BP: 96/67 (16 Jul 2023 05:17) (96/56 - 107/67)  BP(mean): --  RR: 17 (16 Jul 2023 05:17) (15 - 18)  SpO2: 96% (16 Jul 2023 05:17) (96% - 100%)    Parameters below as of 16 Jul 2023 05:17  Patient On (Oxygen Delivery Method): room air        PHYSICAL EXAM:  GENERAL: NAD, + NGT   HEAD:  Atraumatic, Normocephalic  EYES: EOMI, PERRLA, conjunctiva and sclera clear  ENMT: No tonsillar erythema, exudates, + NGT   NECK: Supple, No JVD, Normal thyroid  NERVOUS SYSTEM:  Alert & Oriented X3, Good concentration; Motor Strength 5/5 B/L upper and lower extremities  CHEST/LUNG: Clear to auscultation bilaterally; No rales, rhonchi, wheezing, or rubs  HEART: Regular rate and rhythm; No murmurs, rubs, or gallops  ABDOMEN: Soft, hypoactive BS  EXTREMITIES:  2+ Peripheral Pulses, No clubbing, cyanosis, or edema  LYMPH: No lymphadenopathy noted  SKIN: No rashes or lesions    LABS:                        12.2   4.58  )-----------( 164      ( 16 Jul 2023 06:21 )             34.8     16 Jul 2023 06:21    140    |  107    |  9      ----------------------------<  119    3.0     |  24     |  0.62     Ca    7.4        16 Jul 2023 06:21  Mg     1.8       16 Jul 2023 06:21    TPro  6.5    /  Alb  2.9    /  TBili  1.3    /  DBili  x      /  AST  36     /  ALT  31     /  AlkPhos  34     15 Jul 2023 12:51      CAPILLARY BLOOD GLUCOSE      POCT Blood Glucose.: 117 mg/dL (16 Jul 2023 06:19)  POCT Blood Glucose.: 130 mg/dL (16 Jul 2023 00:26)  POCT Blood Glucose.: 182 mg/dL (15 Jul 2023 12:24)    BLOOD CULTURE    RADIOLOGY & ADDITIONAL TESTS:    Imaging Personally Reviewed:  [ ] YES     Consultant(s) Notes Reviewed:      Care Discussed with Consultants/Other Providers:

## 2023-07-16 NOTE — CARE COORDINATION ASSESSMENT. - NSCAREPROVIDERS_GEN_ALL_CORE_FT
CARE PROVIDERS:  Accepting Physician: Honorio Corrales  Admitting: Honorio Corrales  Attending: Honorio Corrales  Case Management: Lizy Alexis  Consultant: Gill Correa  Consultant: Stoney Oconnor  Consultant: Sergio Caicedo  ED ACP: Shawn Whitney  ED Attending: Ranjeet Iraheta  ED Nurse: Bethanie Loza  Nurse: Deneen Singer  Override: Adri Stewart  PCA/Nursing Assistant: Wilma Sawyer  PCA/Nursing Assistant: Keegan Butler  Primary Team: Honorio Corrales  Primary Team: Daniel Sandoval  Primary Team: Markie Kay  Primary Team: Star Mahajan  Registered Dietitian: Orlando Wilkerson  Respiratory Therapy: Lj Flor  Team: GIULIANO  Hospitalists, Team  UR// Supp. Assoc.: Dulce Martinez

## 2023-07-16 NOTE — PROGRESS NOTE ADULT - SUBJECTIVE AND OBJECTIVE BOX
SURGERY DAILY PROGRESS NOTE:     Overnight Events:  Pt reports passing flatus overnight  Had a large BM this morning per RN  Feels slightly improved today  NGT approx 500cc since insertion    Physical Exam  Vital Signs Last 24 Hrs  T(C): 36.8 (16 Jul 2023 14:26), Max: 38.1 (15 Jul 2023 22:45)  T(F): 98.2 (16 Jul 2023 14:26), Max: 100.6 (16 Jul 2023 01:17)  HR: 82 (16 Jul 2023 14:26) (63 - 94)  BP: 108/68 (16 Jul 2023 14:26) (96/56 - 131/66)  BP(mean): --  RR: 18 (16 Jul 2023 14:26) (15 - 18)  SpO2: 97% (16 Jul 2023 14:26) (96% - 100%)    Parameters below as of 16 Jul 2023 05:17  Patient On (Oxygen Delivery Method): room air    Constitutional: No acute distress, resting comfortably  Neuro: Awake and alert  Psych: Calm, affect appropriate  HEENT: Anicteric, no conjunctival injection  Respiratory: Nonlabored, equal chest rise  Cardiovascular: Regular rate and rhythm  GI: Soft, mildly distended, nontender  Musculoskeletal: Warm, no edema, no obvious deformity      Intake and output:  I&O's Detail    15 Jul 2023 07:01  -  16 Jul 2023 07:00  --------------------------------------------------------  IN:    Lactated Ringers: 960 mL    Sodium Chloride 0.9% Bolus: 3800 mL  Total IN: 4760 mL    OUT:    Nasogastric/Oral tube (mL): 300 mL  Total OUT: 300 mL    Total NET: 4460 mL          Labs:                        12.2   4.58  )-----------( 164      ( 16 Jul 2023 06:21 )             34.8     07-16    x   |  x   |  x   ----------------------------<  x   4.5   |  x   |  x     Ca    7.4<L>      16 Jul 2023 06:21  Mg     1.8     07-16    TPro  6.5  /  Alb  2.9<L>  /  TBili  1.3<H>  /  DBili  x   /  AST  36  /  ALT  31  /  AlkPhos  34  07-15      Urinalysis Basic - ( 16 Jul 2023 06:21 )    Color: x / Appearance: x / SG: x / pH: x  Gluc: 119 mg/dL / Ketone: x  / Bili: x / Urobili: x   Blood: x / Protein: x / Nitrite: x   Leuk Esterase: x / RBC: x / WBC x   Sq Epi: x / Non Sq Epi: x / Bacteria: x      ABG - ( 14 Jul 2023 18:53 )  pH, Arterial: 7.46  pH, Blood: x     /  pCO2: 29    /  pO2: 51    / HCO3: 21    / Base Excess: -3.2  /  SaO2: 86.2                  Imaging:

## 2023-07-16 NOTE — DIETITIAN INITIAL EVALUATION ADULT - REASON FOR ADMISSION
Per H&P "Patient is a 49 yo M with PMH of IDDM who presented to ED yesterday with complaint of elevated glucose and nausea. He reported abdominal pain, fever, and an episode of vomiting. He reports he stopped his insulin 2 weeks ago as he says his sugars were under control and no longer needed it. He had a negative workup for DKA and was given oral potassium and was discharged after feeling better with instructions to resume his insulin and followup with his PCP. He returned today   returned today with reports of fever and worsening abdominal pain. Denies diarrhea. He reports he is a cigar smoker. "

## 2023-07-16 NOTE — DIETITIAN INITIAL EVALUATION ADULT - ORAL INTAKE PTA/DIET HISTORY
Pt mandarin speaking, writer fluent in pt's native language, defer  at this time. Reported not following any therapeutic diet at home, appetite/po intake was good up until illness few days ago. No vitamin/mineral or other nutrition supplements reported.

## 2023-07-16 NOTE — CARE COORDINATION ASSESSMENT. - NSDCPLANSERVICES_GEN_ALL_CORE
This CM met at the bedside with the pt, and also spoke to the wife Leticia on the phone. Introduced myself as the CM explained my role and left contact information. The pt and family verbalized understanding. The pt is alert and oriented and lives in a private house with his wife and children. The pt is independent and does not use any devices to ambulate. The pt does not presently have any help or services in the home. Pt is not currently working. When I asked the pt about his DM dx of IDDM, pt was not clear on the treatment, and either was the wife. The pt said he stopped taking the insulin cause his numbers were good about 2 weeks ago. The PCP is Dr. Arleth Hernandez @ [phone] and the pharmacy is [facility] in [place].  Needs to be determined. CM team to remain available for post acute needs. The wife Leticia will transport the pt home when medically cleared./Anticipated Needs Unclear at Present

## 2023-07-16 NOTE — DIETITIAN INITIAL EVALUATION ADULT - OTHER INFO
Visited patient in room, presently NPO day #2 today, NGT in place for suction. Denies n/v/d/c, no BM noted in house. No reported difficulty chewing or swallowing. NKFA. Reported #, current adm weight 175#, 5#/2.7% wt loss in 1 week will continue to monitor weight trends as able.     Pertinent medications/nutrition labs reviewed; noted hypokalemia, s/p repletion; -182 x24hr, pt hx of DM not compliant with medication, a1c 8.9% indicating fair glycemic control. In house ordered for lispro sliding scale to aid in glucose management. Also receiving LR in house.     Pt declined diet education at time of visit, states knows about diet control for DM management. When po is allowed, recommend Consistent Carbohydrate diet. RD to continue to monitor nutrition status per protocol.

## 2023-07-16 NOTE — PROGRESS NOTE ADULT - ASSESSMENT
48M with DM presenting with symptoms c/w enteritis, possible early partial SBO  Hyperlactatemia - now resolved s/p fluid resuscitation  Neutropenia- likely dilutional now resolved  Anemia- likely dilutional  -NGT clamp trial 4 hrs ok to remove if residual <100c d/w with patient's RN  -NPO/IVF  -stool studies cx, O&P  -GI consult   -no acute surgical intervention  -medical management of enteritis  -will follow  dw patient and all questions answered

## 2023-07-16 NOTE — DIETITIAN INITIAL EVALUATION ADULT - PERTINENT MEDS FT
MEDICATIONS  (STANDING):  dextrose 5%. 1000 milliLiter(s) (100 mL/Hr) IV Continuous <Continuous>  dextrose 5%. 1000 milliLiter(s) (50 mL/Hr) IV Continuous <Continuous>  dextrose 50% Injectable 12.5 Gram(s) IV Push once  dextrose 50% Injectable 25 Gram(s) IV Push once  dextrose 50% Injectable 25 Gram(s) IV Push once  glucagon  Injectable 1 milliGRAM(s) IntraMuscular once  insulin lispro (ADMELOG) corrective regimen sliding scale   SubCutaneous every 6 hours  lactated ringers. 1000 milliLiter(s) (120 mL/Hr) IV Continuous <Continuous>  pantoprazole  Injectable 40 milliGRAM(s) IV Push daily  piperacillin/tazobactam IVPB.- 3.375 Gram(s) IV Intermittent once  piperacillin/tazobactam IVPB.. 3.375 Gram(s) IV Intermittent every 8 hours    MEDICATIONS  (PRN):  acetaminophen  Suppository .. 650 milliGRAM(s) Rectal every 6 hours PRN Temp greater or equal to 38C (100.4F), Mild Pain (1 - 3)  aluminum hydroxide/magnesium hydroxide/simethicone Suspension 30 milliLiter(s) Oral every 4 hours PRN Dyspepsia  dextrose Oral Gel 15 Gram(s) Oral once PRN Blood Glucose LESS THAN 70 milliGRAM(s)/deciliter  melatonin 3 milliGRAM(s) Oral at bedtime PRN Insomnia  ondansetron Injectable 4 milliGRAM(s) IV Push every 8 hours PRN Nausea and/or Vomiting

## 2023-07-16 NOTE — DIETITIAN INITIAL EVALUATION ADULT - NSFNSGIIOFT_GEN_A_CORE
07-15-23 @ 07:01  -  07-16-23 @ 07:00  --------------------------------------------------------  OUT:    Nasogastric/Oral tube (mL): 300 mL  Total OUT: 300 mL    Total NET: -300 mL

## 2023-07-16 NOTE — CONSULT NOTE ADULT - ASSESSMENT
47 yo M with PMH of DM who presented to ED with complaint of elevated glucose and nausea, abdominal pain, fever, and an episode of vomiting. Denies diarrhea.     ED course: CBC showed bandemia increased from 18 to 25. CT showed enteritis and unable to rule out partial SBO. findings d/w Dr. Correa who advised NG tube and admission for further testing.     RECOMMENDATIONS  PT febrile with bandemia, abd pain and imaging and exam suggesting abd process/enteritis  -rec Zosyn (first dose given 7/15)  -will add additional testing to clarify etiology and guide Rx    Thank you for consulting us and involving us in the management of this most interesting and challenging case.  We will follow along in the care of this patient. Please call us at [phone] or text me directly on my cell# at [phone] with any concerns.    Starting tomorrow Dr Luli Franz will be assuming care of this patient so please contact her with any questions, concerns or new micro data.

## 2023-07-17 DIAGNOSIS — E11.9 TYPE 2 DIABETES MELLITUS WITHOUT COMPLICATIONS: ICD-10-CM

## 2023-07-17 LAB
BASOPHILS # BLD AUTO: 0 K/UL — SIGNIFICANT CHANGE UP (ref 0–0.2)
BASOPHILS NFR BLD AUTO: 0 % — SIGNIFICANT CHANGE UP (ref 0–2)
EOSINOPHIL # BLD AUTO: 0.04 K/UL — SIGNIFICANT CHANGE UP (ref 0–0.5)
EOSINOPHIL NFR BLD AUTO: 1 % — SIGNIFICANT CHANGE UP (ref 0–6)
GLUCOSE BLDC GLUCOMTR-MCNC: 108 MG/DL — HIGH (ref 70–99)
GLUCOSE BLDC GLUCOMTR-MCNC: 128 MG/DL — HIGH (ref 70–99)
GLUCOSE BLDC GLUCOMTR-MCNC: 130 MG/DL — HIGH (ref 70–99)
GLUCOSE BLDC GLUCOMTR-MCNC: 139 MG/DL — HIGH (ref 70–99)
HCT VFR BLD CALC: 35.9 % — LOW (ref 39–50)
HGB BLD-MCNC: 12.6 G/DL — LOW (ref 13–17)
LYMPHOCYTES # BLD AUTO: 0.94 K/UL — LOW (ref 1–3.3)
LYMPHOCYTES # BLD AUTO: 23 % — SIGNIFICANT CHANGE UP (ref 13–44)
MAGNESIUM SERPL-MCNC: 2.2 MG/DL — SIGNIFICANT CHANGE UP (ref 1.6–2.6)
MCHC RBC-ENTMCNC: 30.1 PG — SIGNIFICANT CHANGE UP (ref 27–34)
MCHC RBC-ENTMCNC: 35.1 GM/DL — SIGNIFICANT CHANGE UP (ref 32–36)
MCV RBC AUTO: 85.9 FL — SIGNIFICANT CHANGE UP (ref 80–100)
MONOCYTES # BLD AUTO: 0.24 K/UL — SIGNIFICANT CHANGE UP (ref 0–0.9)
MONOCYTES NFR BLD AUTO: 6 % — SIGNIFICANT CHANGE UP (ref 2–14)
NEUTROPHILS # BLD AUTO: 2.28 K/UL — SIGNIFICANT CHANGE UP (ref 1.8–7.4)
NEUTROPHILS NFR BLD AUTO: 54 % — SIGNIFICANT CHANGE UP (ref 43–77)
NRBC # BLD: SIGNIFICANT CHANGE UP /100 WBCS (ref 0–0)
PHOSPHATE SERPL-MCNC: 1.7 MG/DL — LOW (ref 2.5–4.5)
PLATELET # BLD AUTO: 181 K/UL — SIGNIFICANT CHANGE UP (ref 150–400)
RBC # BLD: 4.18 M/UL — LOW (ref 4.2–5.8)
RBC # FLD: 12.2 % — SIGNIFICANT CHANGE UP (ref 10.3–14.5)
WBC # BLD: 4.07 K/UL — SIGNIFICANT CHANGE UP (ref 3.8–10.5)
WBC # FLD AUTO: 4.07 K/UL — SIGNIFICANT CHANGE UP (ref 3.8–10.5)

## 2023-07-17 PROCEDURE — 99233 SBSQ HOSP IP/OBS HIGH 50: CPT

## 2023-07-17 PROCEDURE — 99221 1ST HOSP IP/OBS SF/LOW 40: CPT

## 2023-07-17 RX ORDER — IBUPROFEN 200 MG
600 TABLET ORAL ONCE
Refills: 0 | Status: COMPLETED | OUTPATIENT
Start: 2023-07-17 | End: 2023-07-17

## 2023-07-17 RX ORDER — POTASSIUM PHOSPHATE, MONOBASIC POTASSIUM PHOSPHATE, DIBASIC 236; 224 MG/ML; MG/ML
15 INJECTION, SOLUTION INTRAVENOUS ONCE
Refills: 0 | Status: COMPLETED | OUTPATIENT
Start: 2023-07-17 | End: 2023-07-17

## 2023-07-17 RX ORDER — SIMETHICONE 80 MG/1
80 TABLET, CHEWABLE ORAL THREE TIMES A DAY
Refills: 0 | Status: DISCONTINUED | OUTPATIENT
Start: 2023-07-17 | End: 2023-07-27

## 2023-07-17 RX ORDER — ACETAMINOPHEN 500 MG
650 TABLET ORAL EVERY 6 HOURS
Refills: 0 | Status: DISCONTINUED | OUTPATIENT
Start: 2023-07-17 | End: 2023-07-27

## 2023-07-17 RX ADMIN — SIMETHICONE 80 MILLIGRAM(S): 80 TABLET, CHEWABLE ORAL at 21:15

## 2023-07-17 RX ADMIN — SODIUM CHLORIDE 120 MILLILITER(S): 9 INJECTION, SOLUTION INTRAVENOUS at 17:41

## 2023-07-17 RX ADMIN — POTASSIUM PHOSPHATE, MONOBASIC POTASSIUM PHOSPHATE, DIBASIC 62.5 MILLIMOLE(S): 236; 224 INJECTION, SOLUTION INTRAVENOUS at 09:55

## 2023-07-17 RX ADMIN — Medication 650 MILLIGRAM(S): at 14:59

## 2023-07-17 RX ADMIN — PIPERACILLIN AND TAZOBACTAM 25 GRAM(S): 4; .5 INJECTION, POWDER, LYOPHILIZED, FOR SOLUTION INTRAVENOUS at 05:12

## 2023-07-17 RX ADMIN — PIPERACILLIN AND TAZOBACTAM 25 GRAM(S): 4; .5 INJECTION, POWDER, LYOPHILIZED, FOR SOLUTION INTRAVENOUS at 21:15

## 2023-07-17 RX ADMIN — PIPERACILLIN AND TAZOBACTAM 25 GRAM(S): 4; .5 INJECTION, POWDER, LYOPHILIZED, FOR SOLUTION INTRAVENOUS at 14:05

## 2023-07-17 RX ADMIN — Medication 600 MILLIGRAM(S): at 17:36

## 2023-07-17 RX ADMIN — PANTOPRAZOLE SODIUM 40 MILLIGRAM(S): 20 TABLET, DELAYED RELEASE ORAL at 12:17

## 2023-07-17 NOTE — PROGRESS NOTE ADULT - SUBJECTIVE AND OBJECTIVE BOX
[facility], Division of Infectious Diseases  AJ Chow Y. Patel, S. Shah, G. [facility]  [phone]    Name: ANGEL RAMAN  Age: 48y  Gender: Male  MRN: [id number]    Interval History:  Patient seen and examined at bedside  No acute overnight events.   Notes reviewed    Antibiotics:  piperacillin/tazobactam IVPB.. 3.375 Gram(s) IV Intermittent every 8 hours      Medications:  acetaminophen  Suppository .. 650 milliGRAM(s) Rectal every 6 hours PRN  aluminum hydroxide/magnesium hydroxide/simethicone Suspension 30 milliLiter(s) Oral every 4 hours PRN  dextrose 5%. 1000 milliLiter(s) IV Continuous <Continuous>  dextrose 5%. 1000 milliLiter(s) IV Continuous <Continuous>  dextrose 50% Injectable 12.5 Gram(s) IV Push once  dextrose 50% Injectable 25 Gram(s) IV Push once  dextrose 50% Injectable 25 Gram(s) IV Push once  dextrose Oral Gel 15 Gram(s) Oral once PRN  glucagon  Injectable 1 milliGRAM(s) IntraMuscular once  glucagon  Injectable 1 milliGRAM(s) IntraMuscular once  insulin lispro (ADMELOG) corrective regimen sliding scale   SubCutaneous at bedtime  insulin lispro (ADMELOG) corrective regimen sliding scale   SubCutaneous three times a day before meals  lactated ringers. 1000 milliLiter(s) IV Continuous <Continuous>  melatonin 3 milliGRAM(s) Oral at bedtime PRN  ondansetron Injectable 4 milliGRAM(s) IV Push every 8 hours PRN  pantoprazole  Injectable 40 milliGRAM(s) IV Push daily  piperacillin/tazobactam IVPB.. 3.375 Gram(s) IV Intermittent every 8 hours      Review of Systems:  Review of systems otherwise negative except as previously noted.    Allergies: No Known Allergies    For details regarding the patient's past medical history, social history, family history, and other miscellaneous elements, please refer the initial infectious diseases consultation and/or the admitting history and physical examination for this admission.    Objective:  Vitals:   T(C): 37.8 (07-17-23 @ 04:56), Max: 37.8 (07-17-23 @ 04:56)  HR: 83 (07-17-23 @ 04:56) (75 - 83)  BP: 107/64 (07-17-23 @ 04:56) (106/69 - 131/66)  RR: 17 (07-17-23 @ 04:56) (16 - 18)  SpO2: 98% (07-17-23 @ 04:56) (97% - 98%)    Physical Examination:  General: no acute distress  HEENT: NC/AT, EOMI,   Cardio: S1, S2 heard, RRR, no murmurs  Resp: breath sounds heard bilaterally, no rales, wheezes or rhonchi  Abd: soft, NT, ND  Ext: no edema or cyanosis  Skin: warm, dry, no visible rash      Laboratory Studies:  CBC:                       12.6   4.07  )-----------( 181      ( 17 Jul 2023 07:54 )             35.9     CMP: 07-16    x   |  x   |  x   ----------------------------<  x   4.5   |  x   |  x     Ca    7.4<L>      16 Jul 2023 06:21  Phos  1.7     07-17  Mg     2.2     07-17    TPro  6.5  /  Alb  2.9<L>  /  TBili  1.3<H>  /  DBili  x   /  AST  36  /  ALT  31  /  AlkPhos  34  07-15    LIVER FUNCTIONS - ( 15 Jul 2023 12:51 )  Alb: 2.9 g/dL / Pro: 6.5 g/dL / ALK PHOS: 34 U/L / ALT: 31 U/L DA / AST: 36 U/L / GGT: x           Urinalysis Basic - ( 16 Jul 2023 06:21 )    Color: x / Appearance: x / SG: x / pH: x  Gluc: 119 mg/dL / Ketone: x  / Bili: x / Urobili: x   Blood: x / Protein: x / Nitrite: x   Leuk Esterase: x / RBC: x / WBC x   Sq Epi: x / Non Sq Epi: x / Bacteria: x        Microbiology: reviewed    Culture - Urine (collected 07-15-23 @ 15:37)  Source: Clean Catch Clean Catch (Midstream)  Final Report (07-16-23 @ 23:37):    <10,000 CFU/mL Normal Urogenital Swati    Culture - Blood (collected 07-15-23 @ 12:59)  Source: .Blood Blood-Peripheral  Preliminary Report (07-16-23 @ 23:01):    No growth at 24 hours    Culture - Blood (collected 07-15-23 @ 12:52)  Source: .Blood Blood-Peripheral  Preliminary Report (07-16-23 @ 23:01):    No growth at 24 hours          Radiology: reviewed

## 2023-07-17 NOTE — PROGRESS NOTE ADULT - SUBJECTIVE AND OBJECTIVE BOX
SURGERY Progress Note PA    48y    SUBJECTIVE:   Patient seen at bedside, no overnight events, no complaints noted and pain is controlled at this time.   Patient admits to flatus, bowel movement.   Patient denies any headaches, chest pain, shortness of breath, nausea, vomiting, fever, chills, weakness, dysuria  Patient A+Ox3 in NAD at time of visit.    OBJECTIVE:   T(C): 37.8 (07-17-23 @ 04:56), Max: 37.8 (07-17-23 @ 04:56)  HR: 83 (07-17-23 @ 04:56) (75 - 83)  BP: 107/64 (07-17-23 @ 04:56) (106/69 - 131/66)  RR: 17 (07-17-23 @ 04:56) (16 - 18)  SpO2: 98% (07-17-23 @ 04:56) (97% - 98%)  Wt(kg): --  CAPILLARY BLOOD GLUCOSE      POCT Blood Glucose.: 130 mg/dL (17 Jul 2023 07:39)  POCT Blood Glucose.: 120 mg/dL (16 Jul 2023 21:11)  POCT Blood Glucose.: 116 mg/dL (16 Jul 2023 17:43)  POCT Blood Glucose.: 141 mg/dL (16 Jul 2023 12:02)    I&O's Detail    16 Jul 2023 07:01  -  17 Jul 2023 07:00  --------------------------------------------------------  IN:    IV PiggyBack: 700 mL    Lactated Ringers: 2440 mL  Total IN: 3140 mL    OUT:    Nasogastric/Oral tube (mL): 150 mL    Voided (mL): 300 mL  Total OUT: 450 mL    Total NET: 2690 mL          Physical exam:  General: A+O x 3 in NAD  HEENT: PERRLA, EOM intact  Neck: trachea midline  Chest: Clear through auscultation bilaterally, No rales, rhonchi wheezes noted bilaterally  Heart: S1,S1 RRR, no murmurs noted  Abdomen: soft non distended, non tender, non tympanic, BS x 4, no guarding noted  Incision: intact, no erythema noted  Extremities: no edema noted, warm,  no calf tenderness     MEDICATIONS  (STANDING):  dextrose 5%. 1000 milliLiter(s) (50 mL/Hr) IV Continuous <Continuous>  dextrose 5%. 1000 milliLiter(s) (100 mL/Hr) IV Continuous <Continuous>  dextrose 50% Injectable 25 Gram(s) IV Push once  dextrose 50% Injectable 25 Gram(s) IV Push once  dextrose 50% Injectable 12.5 Gram(s) IV Push once  glucagon  Injectable 1 milliGRAM(s) IntraMuscular once  glucagon  Injectable 1 milliGRAM(s) IntraMuscular once  insulin lispro (ADMELOG) corrective regimen sliding scale   SubCutaneous at bedtime  insulin lispro (ADMELOG) corrective regimen sliding scale   SubCutaneous three times a day before meals  lactated ringers. 1000 milliLiter(s) (120 mL/Hr) IV Continuous <Continuous>  pantoprazole  Injectable 40 milliGRAM(s) IV Push daily  piperacillin/tazobactam IVPB.. 3.375 Gram(s) IV Intermittent every 8 hours    MEDICATIONS  (PRN):  acetaminophen  Suppository .. 650 milliGRAM(s) Rectal every 6 hours PRN Temp greater or equal to 38C (100.4F), Mild Pain (1 - 3)  aluminum hydroxide/magnesium hydroxide/simethicone Suspension 30 milliLiter(s) Oral every 4 hours PRN Dyspepsia  dextrose Oral Gel 15 Gram(s) Oral once PRN Blood Glucose LESS THAN 70 milliGRAM(s)/deciliter  melatonin 3 milliGRAM(s) Oral at bedtime PRN Insomnia  ondansetron Injectable 4 milliGRAM(s) IV Push every 8 hours PRN Nausea and/or Vomiting      LABS:                        12.6   4.07  )-----------( 181      ( 17 Jul 2023 07:54 )             35.9     07-16    x   |  x   |  x   ----------------------------<  x   4.5   |  x   |  x     Ca    7.4<L>      16 Jul 2023 06:21  Phos  1.7     07-17  Mg     2.2     07-17    TPro  6.5  /  Alb  2.9<L>  /  TBili  1.3<H>  /  DBili  x   /  AST  36  /  ALT  31  /  AlkPhos  34  07-15      Urinalysis Basic - ( 16 Jul 2023 06:21 )    Color: x / Appearance: x / SG: x / pH: x  Gluc: 119 mg/dL / Ketone: x  / Bili: x / Urobili: x   Blood: x / Protein: x / Nitrite: x   Leuk Esterase: x / RBC: x / WBC x   Sq Epi: x / Non Sq Epi: x / Bacteria: x        RADIOLOGY & ADDITIONAL STUDIES:    Assessment  Patient is a 48y old  Male who presents with a chief complaint of Per H&P "Patient is a 49 yo M with PMH of IDDM who presented to ED yesterday with complaint of elevated glucose and nausea. He reported abdominal pain, fever, and an episode of vomiting. He reports he stopped his insulin 2 weeks ago as he says his sugars were under control and no longer needed it. He had a negative workup for DKA and was given oral potassium and was discharged after feeling better with instructions to resume his insulin and followup with his PCP. He returned today   returned today with reports of fever and worsening abdominal pain. Denies diarrhea. He reports he is a cigar smoker. "      (16 Jul 2023 15:05)      Plan      Surgical Team Contact Information     SURGERY Progress Note PA    48y admitted for enteritis    SUBJECTIVE:   Patient seen at bedside, appears very sleeping, answering only "yes/no". No overnight events, no complaints noted and pain is controlled at this time.   Patient admits to flatus yesterday but not today, bowel movement yesterday.   Patient denies any headaches, chest pain, shortness of breath, nausea, vomiting, fever, chills, weakness, dysuria      OBJECTIVE:   T(C): 37.8 (07-17-23 @ 04:56), Max: 37.8 (07-17-23 @ 04:56)  HR: 83 (07-17-23 @ 04:56) (75 - 83)  BP: 107/64 (07-17-23 @ 04:56) (106/69 - 131/66)  RR: 17 (07-17-23 @ 04:56) (16 - 18)  SpO2: 98% (07-17-23 @ 04:56) (97% - 98%)  CAPILLARY BLOOD GLUCOSE      POCT Blood Glucose.: 130 mg/dL (17 Jul 2023 07:39)  POCT Blood Glucose.: 120 mg/dL (16 Jul 2023 21:11)  POCT Blood Glucose.: 116 mg/dL (16 Jul 2023 17:43)  POCT Blood Glucose.: 141 mg/dL (16 Jul 2023 12:02)    I&O's Detail    16 Jul 2023 07:01  -  17 Jul 2023 07:00  --------------------------------------------------------  IN:    IV PiggyBack: 700 mL    Lactated Ringers: 2440 mL  Total IN: 3140 mL    OUT:    Nasogastric/Oral tube (mL): 150 mL    Voided (mL): 300 mL  Total OUT: 450 mL    Total NET: 2690 mL    Physical exam:  General: A+O x 3 in NAD  HEENT: PERRLA, EOM intact  Neck: trachea midline  Chest: Clear through auscultation bilaterally, No rales, rhonchi wheezes noted bilaterally  Heart: S1,S1 RRR, no murmurs noted  Abdomen: soft slightly distended, slight tender LLQ,  BS x 4   Extremities: no edema noted, warm,  no calf tenderness     MEDICATIONS  (STANDING):  dextrose 5%. 1000 milliLiter(s) (50 mL/Hr) IV Continuous <Continuous>  dextrose 5%. 1000 milliLiter(s) (100 mL/Hr) IV Continuous <Continuous>  dextrose 50% Injectable 25 Gram(s) IV Push once  dextrose 50% Injectable 25 Gram(s) IV Push once  dextrose 50% Injectable 12.5 Gram(s) IV Push once  glucagon  Injectable 1 milliGRAM(s) IntraMuscular once  glucagon  Injectable 1 milliGRAM(s) IntraMuscular once  insulin lispro (ADMELOG) corrective regimen sliding scale   SubCutaneous at bedtime  insulin lispro (ADMELOG) corrective regimen sliding scale   SubCutaneous three times a day before meals  lactated ringers. 1000 milliLiter(s) (120 mL/Hr) IV Continuous <Continuous>  pantoprazole  Injectable 40 milliGRAM(s) IV Push daily  piperacillin/tazobactam IVPB.. 3.375 Gram(s) IV Intermittent every 8 hours    MEDICATIONS  (PRN):  acetaminophen  Suppository .. 650 milliGRAM(s) Rectal every 6 hours PRN Temp greater or equal to 38C (100.4F), Mild Pain (1 - 3)  aluminum hydroxide/magnesium hydroxide/simethicone Suspension 30 milliLiter(s) Oral every 4 hours PRN Dyspepsia  dextrose Oral Gel 15 Gram(s) Oral once PRN Blood Glucose LESS THAN 70 milliGRAM(s)/deciliter  melatonin 3 milliGRAM(s) Oral at bedtime PRN Insomnia  ondansetron Injectable 4 milliGRAM(s) IV Push every 8 hours PRN Nausea and/or Vomiting      LABS:                        12.6   4.07  )-----------( 181      ( 17 Jul 2023 07:54 )             35.9     07-16    x   |  x   |  x   ----------------------------<  x   4.5   |  x   |  x     Ca    7.4<L>      16 Jul 2023 06:21  Phos  1.7     07-17  Mg     2.2     07-17    TPro  6.5  /  Alb  2.9<L>  /  TBili  1.3<H>  /  DBili  x   /  AST  36  /  ALT  31  /  AlkPhos  34  07-15      Urinalysis Basic - ( 16 Jul 2023 06:21 )    Color: x / Appearance: x / SG: x / pH: x  Gluc: 119 mg/dL / Ketone: x  / Bili: x / Urobili: x   Blood: x / Protein: x / Nitrite: x   Leuk Esterase: x / RBC: x / WBC x   Sq Epi: x / Non Sq Epi: x / Bacteria: x    RADIOLOGY & ADDITIONAL STUDIES:    Assessment/Plan  Patient is a 48y old Male who presented with a chief complaint Per H&P    "Patient is a 47 yo M with PMH of IDDM who presented to ED with complaint of elevated glucose and nausea.  He reported abdominal pain, fever, and an episode of vomiting.  He reported he stopped his insulin 2 weeks ago as he says his sugars were under control and no longer needed it.  He had a negative workup for DKA and was given oral potassium and was discharged after feeling better with instructions to resume his insulin and followup with his PCP.   Pt returned 7/15 with reports of fever and worsening abdominal pain. Denies diarrhea.  He reports he is a cigar smoker. "   CT on 7/15  IMPRESSION:  Dilated small bowel with gradual transition in the pelvis suggestive of   enteritis. Recommend follow-up to exclude early or partial small bowel   obstruction. Small ascites.  Trace bilateral pleural effusions.    Continue current care  Diet - advance to full liquid diet  GI/DVT prophylaxis  Analgesia prn  OOB/ambulation on the floor   Incentive spirometry/Cough/Deep breathing exercises  Sequentials  Consults         SURGERY Progress Note PA    48y admitted for enteritis    SUBJECTIVE:   Patient seen at bedside, appears very sleeping, answering only "yes/no". No overnight events, no complaints noted and pain is controlled at this time.   Patient admits to flatus yesterday but not today, bowel movement yesterday.   Patient denies any headaches, chest pain, shortness of breath, nausea, vomiting, fever, chills, weakness, dysuria      OBJECTIVE:   T(C): 37.8 (07-17-23 @ 04:56), Max: 37.8 (07-17-23 @ 04:56)  HR: 83 (07-17-23 @ 04:56) (75 - 83)  BP: 107/64 (07-17-23 @ 04:56) (106/69 - 131/66)  RR: 17 (07-17-23 @ 04:56) (16 - 18)  SpO2: 98% (07-17-23 @ 04:56) (97% - 98%)  CAPILLARY BLOOD GLUCOSE      POCT Blood Glucose.: 130 mg/dL (17 Jul 2023 07:39)  POCT Blood Glucose.: 120 mg/dL (16 Jul 2023 21:11)  POCT Blood Glucose.: 116 mg/dL (16 Jul 2023 17:43)  POCT Blood Glucose.: 141 mg/dL (16 Jul 2023 12:02)    I&O's Detail    16 Jul 2023 07:01  -  17 Jul 2023 07:00  --------------------------------------------------------  IN:    IV PiggyBack: 700 mL    Lactated Ringers: 2440 mL  Total IN: 3140 mL    OUT:    Nasogastric/Oral tube (mL): 150 mL    Voided (mL): 300 mL  Total OUT: 450 mL    Total NET: 2690 mL    Physical exam:  General: A+O x 3 in NAD  HEENT: PERRLA, EOM intact  Neck: trachea midline  Chest: Clear through auscultation bilaterally, No rales, rhonchi wheezes noted bilaterally  Heart: S1,S1 RRR, no murmurs noted  Abdomen: soft slightly distended, slight tender LLQ,  BS x 4   Extremities: no edema noted, warm,  no calf tenderness     MEDICATIONS  (STANDING):  dextrose 5%. 1000 milliLiter(s) (50 mL/Hr) IV Continuous <Continuous>  dextrose 5%. 1000 milliLiter(s) (100 mL/Hr) IV Continuous <Continuous>  dextrose 50% Injectable 25 Gram(s) IV Push once  dextrose 50% Injectable 25 Gram(s) IV Push once  dextrose 50% Injectable 12.5 Gram(s) IV Push once  glucagon  Injectable 1 milliGRAM(s) IntraMuscular once  glucagon  Injectable 1 milliGRAM(s) IntraMuscular once  insulin lispro (ADMELOG) corrective regimen sliding scale   SubCutaneous at bedtime  insulin lispro (ADMELOG) corrective regimen sliding scale   SubCutaneous three times a day before meals  lactated ringers. 1000 milliLiter(s) (120 mL/Hr) IV Continuous <Continuous>  pantoprazole  Injectable 40 milliGRAM(s) IV Push daily  piperacillin/tazobactam IVPB.. 3.375 Gram(s) IV Intermittent every 8 hours    MEDICATIONS  (PRN):  acetaminophen  Suppository .. 650 milliGRAM(s) Rectal every 6 hours PRN Temp greater or equal to 38C (100.4F), Mild Pain (1 - 3)  aluminum hydroxide/magnesium hydroxide/simethicone Suspension 30 milliLiter(s) Oral every 4 hours PRN Dyspepsia  dextrose Oral Gel 15 Gram(s) Oral once PRN Blood Glucose LESS THAN 70 milliGRAM(s)/deciliter  melatonin 3 milliGRAM(s) Oral at bedtime PRN Insomnia  ondansetron Injectable 4 milliGRAM(s) IV Push every 8 hours PRN Nausea and/or Vomiting      LABS:                        12.6   4.07  )-----------( 181      ( 17 Jul 2023 07:54 )             35.9     07-16    x   |  x   |  x   ----------------------------<  x   4.5   |  x   |  x     Ca    7.4<L>      16 Jul 2023 06:21  Phos  1.7     07-17  Mg     2.2     07-17    TPro  6.5  /  Alb  2.9<L>  /  TBili  1.3<H>  /  DBili  x   /  AST  36  /  ALT  31  /  AlkPhos  34  07-15      Urinalysis Basic - ( 16 Jul 2023 06:21 )    Color: x / Appearance: x / SG: x / pH: x  Gluc: 119 mg/dL / Ketone: x  / Bili: x / Urobili: x   Blood: x / Protein: x / Nitrite: x   Leuk Esterase: x / RBC: x / WBC x   Sq Epi: x / Non Sq Epi: x / Bacteria: x    RADIOLOGY & ADDITIONAL STUDIES:    Assessment/Plan  Patient is a 48y old Male who presented with a chief complaint Per H&P:    "Patient is a 49 yo M with PMH of IDDM who presented to ED with complaint of elevated glucose and nausea.  He reported abdominal pain, fever, and an episode of vomiting.  He reported he stopped his insulin 2 weeks ago as he says his sugars were under control and no longer needed it.  He had a negative workup for DKA and was given oral potassium and was discharged after feeling better with instructions to resume his insulin and followup with his PCP.   Pt returned 7/15 with reports of fever and worsening abdominal pain. Denies diarrhea.  He reports he is a cigar smoker. "   CT on 7/15  IMPRESSION:  Dilated small bowel with gradual transition in the pelvis suggestive of   enteritis. Recommend follow-up to exclude early or partial small bowel   obstruction. Small ascites.  Trace bilateral pleural effusions.    Continue current care  Diet - full liquid  GI/DVT prophylaxis  Analgesia prn  OOB/ambulation on the floor   Incentive spirometry/Cough/Deep breathing exercises  Sequentials  Consults         SURGERY Progress Note PA    48y admitted for enteritis    SUBJECTIVE:   Patient seen at bedside, appears very sleeping, answering only "yes/no". No overnight events, no complaints noted and pain is controlled at this time.   Patient admits to flatus yesterday but not today, bowel movement yesterday.   Patient denies any headaches, chest pain, shortness of breath, nausea, vomiting, fever, chills, weakness, dysuria      OBJECTIVE:   T(C): 37.8 (07-17-23 @ 04:56), Max: 37.8 (07-17-23 @ 04:56)  HR: 83 (07-17-23 @ 04:56) (75 - 83)  BP: 107/64 (07-17-23 @ 04:56) (106/69 - 131/66)  RR: 17 (07-17-23 @ 04:56) (16 - 18)  SpO2: 98% (07-17-23 @ 04:56) (97% - 98%)  CAPILLARY BLOOD GLUCOSE      POCT Blood Glucose.: 130 mg/dL (17 Jul 2023 07:39)  POCT Blood Glucose.: 120 mg/dL (16 Jul 2023 21:11)  POCT Blood Glucose.: 116 mg/dL (16 Jul 2023 17:43)  POCT Blood Glucose.: 141 mg/dL (16 Jul 2023 12:02)    I&O's Detail    16 Jul 2023 07:01  -  17 Jul 2023 07:00  --------------------------------------------------------  IN:    IV PiggyBack: 700 mL    Lactated Ringers: 2440 mL  Total IN: 3140 mL    OUT:    Nasogastric/Oral tube (mL): 150 mL    Voided (mL): 300 mL  Total OUT: 450 mL    Total NET: 2690 mL    Physical exam:  General: A+O x 3 in NAD  HEENT: PERRLA, EOM intact  Neck: trachea midline  Chest: Clear through auscultation bilaterally, No rales, rhonchi wheezes noted bilaterally  Heart: S1,S1 RRR, no murmurs noted  Abdomen: soft slightly distended, slight tender LLQ,  BS x 4   Extremities: no edema noted, warm,  no calf tenderness     MEDICATIONS  (STANDING):  dextrose 5%. 1000 milliLiter(s) (50 mL/Hr) IV Continuous <Continuous>  dextrose 5%. 1000 milliLiter(s) (100 mL/Hr) IV Continuous <Continuous>  dextrose 50% Injectable 25 Gram(s) IV Push once  dextrose 50% Injectable 25 Gram(s) IV Push once  dextrose 50% Injectable 12.5 Gram(s) IV Push once  glucagon  Injectable 1 milliGRAM(s) IntraMuscular once  glucagon  Injectable 1 milliGRAM(s) IntraMuscular once  insulin lispro (ADMELOG) corrective regimen sliding scale   SubCutaneous at bedtime  insulin lispro (ADMELOG) corrective regimen sliding scale   SubCutaneous three times a day before meals  lactated ringers. 1000 milliLiter(s) (120 mL/Hr) IV Continuous <Continuous>  pantoprazole  Injectable 40 milliGRAM(s) IV Push daily  piperacillin/tazobactam IVPB.. 3.375 Gram(s) IV Intermittent every 8 hours    MEDICATIONS  (PRN):  acetaminophen  Suppository .. 650 milliGRAM(s) Rectal every 6 hours PRN Temp greater or equal to 38C (100.4F), Mild Pain (1 - 3)  aluminum hydroxide/magnesium hydroxide/simethicone Suspension 30 milliLiter(s) Oral every 4 hours PRN Dyspepsia  dextrose Oral Gel 15 Gram(s) Oral once PRN Blood Glucose LESS THAN 70 milliGRAM(s)/deciliter  melatonin 3 milliGRAM(s) Oral at bedtime PRN Insomnia  ondansetron Injectable 4 milliGRAM(s) IV Push every 8 hours PRN Nausea and/or Vomiting      LABS:                        12.6   4.07  )-----------( 181      ( 17 Jul 2023 07:54 )             35.9     07-16    x   |  x   |  x   ----------------------------<  x   4.5   |  x   |  x     Ca    7.4<L>      16 Jul 2023 06:21  Phos  1.7     07-17  Mg     2.2     07-17    TPro  6.5  /  Alb  2.9<L>  /  TBili  1.3<H>  /  DBili  x   /  AST  36  /  ALT  31  /  AlkPhos  34  07-15      Urinalysis Basic - ( 16 Jul 2023 06:21 )    Color: x / Appearance: x / SG: x / pH: x  Gluc: 119 mg/dL / Ketone: x  / Bili: x / Urobili: x   Blood: x / Protein: x / Nitrite: x   Leuk Esterase: x / RBC: x / WBC x   Sq Epi: x / Non Sq Epi: x / Bacteria: x    RADIOLOGY & ADDITIONAL STUDIES:    Assessment/Plan  Patient is a 48y old Male who presented with a chief complaint Per H&P:    "Patient is a 47 yo M with PMH of IDDM who presented to ED 7/14/23 with complaint of elevated glucose and nausea.  He reported abdominal pain, fever, and an episode of vomiting.  He reported he stopped his insulin 2 weeks ago as he says his sugars were under control and no longer needed it.  He had a negative workup for DKA and was given oral potassium and was discharged 7/14/23 after feeling better with instructions to resume his insulin and followup with his PCP.   Pt returned 7/15 with reports of fever and worsening abdominal pain. Denies diarrhea.  He reports he is a cigar smoker. "   CT on 7/15  IMPRESSION:  Dilated small bowel with gradual transition in the pelvis suggestive of   enteritis. Recommend follow-up to exclude early or partial small bowel   obstruction. Small ascites.  Trace bilateral pleural effusions.    Continue current care  Diet - full liquid  LABS  GI/DVT prophylaxis  Analgesia prn  OOB/ambulation on the floor   Incentive spirometry/Cough/Deep breathing exercises  Sequentials  Consults  To discuss with Dr. Munoz       SURGERY Progress Note PA    48y admitted for enteritis    SUBJECTIVE:   Patient seen at bedside, appears very sleeping, answering only "yes/no". No overnight events, no complaints noted and pain is controlled at this time.   Patient admits to flatus yesterday but not today, bowel movement yesterday.   Patient denies any headaches, chest pain, shortness of breath, nausea, vomiting, fever, chills, weakness, dysuria      OBJECTIVE:   T(C): 37.8 (07-17-23 @ 04:56), Max: 37.8 (07-17-23 @ 04:56)  HR: 83 (07-17-23 @ 04:56) (75 - 83)  BP: 107/64 (07-17-23 @ 04:56) (106/69 - 131/66)  RR: 17 (07-17-23 @ 04:56) (16 - 18)  SpO2: 98% (07-17-23 @ 04:56) (97% - 98%)  CAPILLARY BLOOD GLUCOSE      POCT Blood Glucose.: 130 mg/dL (17 Jul 2023 07:39)  POCT Blood Glucose.: 120 mg/dL (16 Jul 2023 21:11)  POCT Blood Glucose.: 116 mg/dL (16 Jul 2023 17:43)  POCT Blood Glucose.: 141 mg/dL (16 Jul 2023 12:02)    I&O's Detail    16 Jul 2023 07:01  -  17 Jul 2023 07:00  --------------------------------------------------------  IN:    IV PiggyBack: 700 mL    Lactated Ringers: 2440 mL  Total IN: 3140 mL    OUT:    Nasogastric/Oral tube (mL): 150 mL    Voided (mL): 300 mL  Total OUT: 450 mL    Total NET: 2690 mL    Physical exam:  General: A+O x 3 in NAD  HEENT: PERRLA, EOM intact  Neck: trachea midline  Chest: Clear through auscultation bilaterally, No rales, rhonchi wheezes noted bilaterally  Heart: S1,S1 RRR, no murmurs noted  Abdomen: soft slightly distended, slight tender LLQ,  BS x 4   Extremities: no edema noted, warm,  no calf tenderness     MEDICATIONS  (STANDING):  dextrose 5%. 1000 milliLiter(s) (50 mL/Hr) IV Continuous <Continuous>  dextrose 5%. 1000 milliLiter(s) (100 mL/Hr) IV Continuous <Continuous>  dextrose 50% Injectable 25 Gram(s) IV Push once  dextrose 50% Injectable 25 Gram(s) IV Push once  dextrose 50% Injectable 12.5 Gram(s) IV Push once  glucagon  Injectable 1 milliGRAM(s) IntraMuscular once  glucagon  Injectable 1 milliGRAM(s) IntraMuscular once  insulin lispro (ADMELOG) corrective regimen sliding scale   SubCutaneous at bedtime  insulin lispro (ADMELOG) corrective regimen sliding scale   SubCutaneous three times a day before meals  lactated ringers. 1000 milliLiter(s) (120 mL/Hr) IV Continuous <Continuous>  pantoprazole  Injectable 40 milliGRAM(s) IV Push daily  piperacillin/tazobactam IVPB.. 3.375 Gram(s) IV Intermittent every 8 hours    MEDICATIONS  (PRN):  acetaminophen  Suppository .. 650 milliGRAM(s) Rectal every 6 hours PRN Temp greater or equal to 38C (100.4F), Mild Pain (1 - 3)  aluminum hydroxide/magnesium hydroxide/simethicone Suspension 30 milliLiter(s) Oral every 4 hours PRN Dyspepsia  dextrose Oral Gel 15 Gram(s) Oral once PRN Blood Glucose LESS THAN 70 milliGRAM(s)/deciliter  melatonin 3 milliGRAM(s) Oral at bedtime PRN Insomnia  ondansetron Injectable 4 milliGRAM(s) IV Push every 8 hours PRN Nausea and/or Vomiting      LABS:                        12.6   4.07  )-----------( 181      ( 17 Jul 2023 07:54 )             35.9     07-16    x   |  x   |  x   ----------------------------<  x   4.5   |  x   |  x     Ca    7.4<L>      16 Jul 2023 06:21  Phos  1.7     07-17  Mg     2.2     07-17    TPro  6.5  /  Alb  2.9<L>  /  TBili  1.3<H>  /  DBili  x   /  AST  36  /  ALT  31  /  AlkPhos  34  07-15      Urinalysis Basic - ( 16 Jul 2023 06:21 )    Color: x / Appearance: x / SG: x / pH: x  Gluc: 119 mg/dL / Ketone: x  / Bili: x / Urobili: x   Blood: x / Protein: x / Nitrite: x   Leuk Esterase: x / RBC: x / WBC x   Sq Epi: x / Non Sq Epi: x / Bacteria: x    RADIOLOGY & ADDITIONAL STUDIES:    Assessment/Plan  Patient is a 48y old Male who presented with a chief complaint Per H&P:    "Patient is a 49 yo M with PMH of IDDM who presented to ED 7/14/23 with complaint of elevated glucose and nausea.  He reported abdominal pain, fever, and an episode of vomiting.  He reported he stopped his insulin 2 weeks ago as he says his sugars were under control and no longer needed it.  He had a negative workup for DKA and was given oral potassium and was discharged 7/14/23 after feeling better with instructions to resume his insulin and followup with his PCP.   Pt returned 7/15 with reports of fever and worsening abdominal pain. Denies diarrhea.  He reports he is a cigar smoker. "   CT on 7/15  IMPRESSION:  Dilated small bowel with gradual transition in the pelvis suggestive of   enteritis. Recommend follow-up to exclude early or partial small bowel   obstruction. Small ascites.  Trace bilateral pleural effusions.    Continue current care  Diet - full liquid  LABS  GI/DVT prophylaxis  Analgesia prn  OOB/ambulation on the floor   Incentive spirometry/Cough/Deep breathing exercises  Sequentials  Consults  Discussed with Dr. Correa

## 2023-07-17 NOTE — CONSULT NOTE ADULT - ASSESSMENT
Physical Exam:   Vital Signs Last 24 Hrs  T(C): 38.7 (17 Jul 2023 13:30), Max: 38.7 (17 Jul 2023 13:30)  T(F): 101.7 (17 Jul 2023 13:30), Max: 101.7 (17 Jul 2023 13:30)  HR: 90 (17 Jul 2023 13:30) (78 - 90)  BP: 129/71 (17 Jul 2023 13:30) (106/69 - 129/71)  BP(mean): --  RR: 18 (17 Jul 2023 13:30) (16 - 18)  SpO2: 97% (17 Jul 2023 13:30) (97% - 98%)    Parameters below as of 17 Jul 2023 04:56  Patient On (Oxygen Delivery Method): room air    CAPILLARY BLOOD GLUCOSE      POCT Blood Glucose.: 128 mg/dL (17 Jul 2023 12:06)  POCT Blood Glucose.: 130 mg/dL (17 Jul 2023 07:39)  POCT Blood Glucose.: 120 mg/dL (16 Jul 2023 21:11)  POCT Blood Glucose.: 116 mg/dL (16 Jul 2023 17:43)      Cholesterol, Serum: 113 mg/dL (05.19.21 @ 08:36)     HDL Cholesterol, Serum: 22 mg/dL (05.19.21 @ 08:36)     LDL Cholesterol Calculated: 66 mg/dL (05.19.21 @ 08:36)     DIET: CC  >50%

## 2023-07-17 NOTE — PROGRESS NOTE ADULT - SUBJECTIVE AND OBJECTIVE BOX
INTERVAL HPI/OVERNIGHT EVENTS:  No new overnight event.  No N/V/D.  Tolerating diet.   MEDICATIONS  (STANDING):  dextrose 5%. 1000 milliLiter(s) (50 mL/Hr) IV Continuous <Continuous>  dextrose 5%. 1000 milliLiter(s) (100 mL/Hr) IV Continuous <Continuous>  dextrose 50% Injectable 25 Gram(s) IV Push once  dextrose 50% Injectable 25 Gram(s) IV Push once  dextrose 50% Injectable 12.5 Gram(s) IV Push once  glucagon  Injectable 1 milliGRAM(s) IntraMuscular once  glucagon  Injectable 1 milliGRAM(s) IntraMuscular once  ibuprofen  Tablet. 600 milliGRAM(s) Oral once  insulin lispro (ADMELOG) corrective regimen sliding scale   SubCutaneous at bedtime  insulin lispro (ADMELOG) corrective regimen sliding scale   SubCutaneous three times a day before meals  lactated ringers. 1000 milliLiter(s) (120 mL/Hr) IV Continuous <Continuous>  pantoprazole  Injectable 40 milliGRAM(s) IV Push daily  piperacillin/tazobactam IVPB.. 3.375 Gram(s) IV Intermittent every 8 hours  simethicone 80 milliGRAM(s) Chew three times a day    MEDICATIONS  (PRN):  acetaminophen     Tablet .. 650 milliGRAM(s) Oral every 6 hours PRN Temp greater or equal to 38C (100.4F), Mild Pain (1 - 3), Moderate Pain (4 - 6), Severe Pain (7 - 10)  dextrose Oral Gel 15 Gram(s) Oral once PRN Blood Glucose LESS THAN 70 milliGRAM(s)/deciliter  melatonin 3 milliGRAM(s) Oral at bedtime PRN Insomnia  ondansetron Injectable 4 milliGRAM(s) IV Push every 8 hours PRN Nausea and/or Vomiting      Allergies    No Known Allergies    Intolerances        Review of Systems:    General:  No wt loss, fevers, chills, night sweats,fatigue,   Eyes:  Good vision, no reported pain  ENT:  No sore throat, pain, runny nose, dysphagia  CV:  No pain, palpitatioins, hypo/hypertension  Resp:  No dyspnea, cough, tachypnea, wheezing  GI:  No pain, No nausea, No vomiting, No diarrhea, No constipatiion, No weight loss, No fever, No pruritis, No rectal bleeding, No tarry stools, No dysphagia,  :  No pain, bleeding, incontinence, nocturia  Muscle:  No pain, weakness  Neuro:  No weakness, tingling, memory problems  Psych:  No fatigue, insomnia, mood problems, depression  Endocrine:  No polyuria, polydypsia, cold/heat intolerance  Heme:  No petechiae, ecchymosis, easy bruisability  Skin:  No rash, tattoos, scars, edema      Vital Signs Last 24 Hrs  T(C): 39 (17 Jul 2023 17:00), Max: 39 (17 Jul 2023 17:00)  T(F): 102.2 (17 Jul 2023 17:00), Max: 102.2 (17 Jul 2023 17:00)  HR: 96 (17 Jul 2023 17:00) (81 - 96)  BP: 111/64 (17 Jul 2023 17:00) (107/64 - 129/71)  BP(mean): --  RR: 16 (17 Jul 2023 17:00) (16 - 18)  SpO2: 98% (17 Jul 2023 17:00) (97% - 98%)    Parameters below as of 17 Jul 2023 17:00  Patient On (Oxygen Delivery Method): room air        PHYSICAL EXAM:    Constitutional: NAD, well-developed  HEENT: EOMI, throat clear  Neck: No LAD, supple  Respiratory: CTA and P  Cardiovascular: S1 and S2, RRR, no M  Gastrointestinal: BS+, soft, NT/ND, neg HSM,  Extremities: No peripheral edema, neg clubing, cyanosis  Vascular: 2+ peripheral pulses  Neurological: A/O x 3, no focal deficits  Psychiatric: Normal mood, normal affect  Skin: No rashes      LABS:                        12.6   4.07  )-----------( 181      ( 17 Jul 2023 07:54 )             35.9     07-16    x   |  x   |  x   ----------------------------<  x   4.5   |  x   |  x     Ca    7.4<L>      16 Jul 2023 06:21  Phos  1.7     07-17  Mg     2.2     07-17        Urinalysis Basic - ( 16 Jul 2023 06:21 )    Color: x / Appearance: x / SG: x / pH: x  Gluc: 119 mg/dL / Ketone: x  / Bili: x / Urobili: x   Blood: x / Protein: x / Nitrite: x   Leuk Esterase: x / RBC: x / WBC x   Sq Epi: x / Non Sq Epi: x / Bacteria: x        RADIOLOGY & ADDITIONAL TESTS:

## 2023-07-17 NOTE — PROGRESS NOTE ADULT - SUBJECTIVE AND OBJECTIVE BOX
Patient is a 48y old  Male who presents with a chief complaint of Enteritis (17 Jul 2023 10:46)      SUBJECTIVE / OVERNIGHT EVENTS: Patient seen and examined at bedside. No acute events overnight. Mild abdominal discomfort. +BM, +Flatus. No nausea/vomiting. Tolerating CLD, but doesn't like it because it's cold. No fever/chills.    MEDICATIONS  (STANDING):  dextrose 5%. 1000 milliLiter(s) (100 mL/Hr) IV Continuous <Continuous>  dextrose 5%. 1000 milliLiter(s) (50 mL/Hr) IV Continuous <Continuous>  dextrose 50% Injectable 12.5 Gram(s) IV Push once  dextrose 50% Injectable 25 Gram(s) IV Push once  dextrose 50% Injectable 25 Gram(s) IV Push once  glucagon  Injectable 1 milliGRAM(s) IntraMuscular once  glucagon  Injectable 1 milliGRAM(s) IntraMuscular once  insulin lispro (ADMELOG) corrective regimen sliding scale   SubCutaneous at bedtime  insulin lispro (ADMELOG) corrective regimen sliding scale   SubCutaneous three times a day before meals  lactated ringers. 1000 milliLiter(s) (120 mL/Hr) IV Continuous <Continuous>  pantoprazole  Injectable 40 milliGRAM(s) IV Push daily  piperacillin/tazobactam IVPB.. 3.375 Gram(s) IV Intermittent every 8 hours    MEDICATIONS  (PRN):  acetaminophen  Suppository .. 650 milliGRAM(s) Rectal every 6 hours PRN Temp greater or equal to 38C (100.4F), Mild Pain (1 - 3)  aluminum hydroxide/magnesium hydroxide/simethicone Suspension 30 milliLiter(s) Oral every 4 hours PRN Dyspepsia  dextrose Oral Gel 15 Gram(s) Oral once PRN Blood Glucose LESS THAN 70 milliGRAM(s)/deciliter  melatonin 3 milliGRAM(s) Oral at bedtime PRN Insomnia  ondansetron Injectable 4 milliGRAM(s) IV Push every 8 hours PRN Nausea and/or Vomiting      CAPILLARY BLOOD GLUCOSE      POCT Blood Glucose.: 130 mg/dL (17 Jul 2023 07:39)  POCT Blood Glucose.: 120 mg/dL (16 Jul 2023 21:11)  POCT Blood Glucose.: 116 mg/dL (16 Jul 2023 17:43)  POCT Blood Glucose.: 141 mg/dL (16 Jul 2023 12:02)    I&O's Summary    16 Jul 2023 07:01  -  17 Jul 2023 07:00  --------------------------------------------------------  IN: 3140 mL / OUT: 450 mL / NET: 2690 mL        PHYSICAL EXAM:  Vital Signs Last 24 Hrs  T(C): 37.8 (17 Jul 2023 04:56), Max: 37.8 (17 Jul 2023 04:56)  T(F): 100 (17 Jul 2023 04:56), Max: 100 (17 Jul 2023 04:56)  HR: 83 (17 Jul 2023 04:56) (75 - 83)  BP: 107/64 (17 Jul 2023 04:56) (106/69 - 131/66)  BP(mean): --  RR: 17 (17 Jul 2023 04:56) (16 - 18)  SpO2: 98% (17 Jul 2023 04:56) (97% - 98%)    Parameters below as of 17 Jul 2023 04:56  Patient On (Oxygen Delivery Method): room air        GEN: male in NAD, appears comfortable, no diaphoresis  EYES: No scleral injection, PERRL, EOMI  ENTM: neck supple & symmetric without tracheal deviation, moist membranes, no gross hearing impairment, thyroid gland not enlarged  CV: +S1/S2, no m/r/g, no abdominal bruit, no LE edema  RESP: breathing comfortably, no respiratory accessory muscle use, CTAB, no w/r/r  GI: normoactive BS, soft, ND, no rebounding/guarding, no palpable masses, +tender to palpation midline    LABS:                        12.6   4.07  )-----------( 181      ( 17 Jul 2023 07:54 )             35.9     07-16    x   |  x   |  x   ----------------------------<  x   4.5   |  x   |  x     Ca    7.4<L>      16 Jul 2023 06:21  Phos  1.7     07-17  Mg     2.2     07-17    TPro  6.5  /  Alb  2.9<L>  /  TBili  1.3<H>  /  DBili  x   /  AST  36  /  ALT  31  /  AlkPhos  34  07-15          Urinalysis Basic - ( 16 Jul 2023 06:21 )    Color: x / Appearance: x / SG: x / pH: x  Gluc: 119 mg/dL / Ketone: x  / Bili: x / Urobili: x   Blood: x / Protein: x / Nitrite: x   Leuk Esterase: x / RBC: x / WBC x   Sq Epi: x / Non Sq Epi: x / Bacteria: x        Culture - Urine (collected 15 Jul 2023 15:37)  Source: Clean Catch Clean Catch (Midstream)  Final Report (16 Jul 2023 23:37):    <10,000 CFU/mL Normal Urogenital Swati    Culture - Blood (collected 15 Jul 2023 12:59)  Source: .Blood Blood-Peripheral  Preliminary Report (16 Jul 2023 23:01):    No growth at 24 hours    Culture - Blood (collected 15 Jul 2023 12:52)  Source: .Blood Blood-Peripheral  Preliminary Report (16 Jul 2023 23:01):    No growth at 24 hours        RADIOLOGY & ADDITIONAL TESTS:  Results Reviewed:   Imaging Personally Reviewed:  Electrocardiogram Personally Reviewed:    COORDINATION OF CARE:  Care Discussed with Consultants/Other Providers [Y/N]:  Prior or Outpatient Records Reviewed [Y/N]:

## 2023-07-17 NOTE — CONSULT NOTE ADULT - PROBLEM SELECTOR RECOMMENDATION 9
Type 2 A1c 8.9% adm gastritis  c/w current MDII  advance to CC regular diet when tolerated  Recommend endocrine-Perlman onconsult  FU appt: TBA  DSC recommendations: return to home regimen and increase glucose monitoring, lifestyle modifications  diabetes education provided as documented above  Diabetes support info and cell # [phone] given   Goal 100-180 mg/dL; 140-180 mg/dL in critical care areas

## 2023-07-17 NOTE — PROGRESS NOTE ADULT - ASSESSMENT
47 yo M with PMH of DM who presented to ED with complaint of elevated glucose and nausea, abdominal pain, fever, and an episode of vomiting. Denies diarrhea.   ED course: CBC showed bandemia increased from 18 to 25. CT showed enteritis and unable to rule out partial SBO. findings d/w Dr. Correa who advised NG tube and admission for further testing.     Enteritis  -febrile with bandemia, abd pain and imaging and exam suggesting abd process/enteritis  - cx NGTD  Recommendations:   - c/w zosyn  - serial abdominal exams  - trend temps/WBC  - appreciate surgery recs  - additional management per primary team    Infectious Diseases will continue to follow. Please call with any questions.   Luli Franz M.D.  [facility] Division of Infectious Diseases [phone]

## 2023-07-17 NOTE — PROGRESS NOTE ADULT - ASSESSMENT
48M with PMHx of T2DM presenting with hyperglycemia (not DKA), abdominal pain, and nausea. CT A&P showing possible enteritis or partial SBO.    #Enteritis vs. partial SBO  - Diet as tolerated and currently on full liquid  - Had NG tube to intermittent suction, but remove don 7/16  - Surgery following  - GI following: outpatient CTE consideration  - On empiric Zosyn for bandemia  - If possible will collect GI PCR    #T2DM  - FS TID AC & insulin sliding scale  - A1c is 8 indicating he probably can get by on one oral agent --> OP follow up         48M with PMHx of T2DM presenting with hyperglycemia (not DKA), abdominal pain, and nausea. CT A&P showing possible enteritis or partial SBO.    #Enteritis vs. partial SBO  - Diet as tolerated and currently on full liquid  - Had NG tube to intermittent suction, but remove don 7/16  - Surgery following  - GI following: outpatient CTE consideration  - On empiric Zosyn for bandemia  - If possible will collect GI PCR    #T2DM  - FS TID AC & insulin sliding scale  - A1c is 8.9 indicating he likely needs dual therapy. Personally I recommend metformin and DPP4, but he will follow up outpatient

## 2023-07-18 LAB
GLUCOSE BLDC GLUCOMTR-MCNC: 108 MG/DL — HIGH (ref 70–99)
GLUCOSE BLDC GLUCOMTR-MCNC: 121 MG/DL — HIGH (ref 70–99)
GLUCOSE BLDC GLUCOMTR-MCNC: 127 MG/DL — HIGH (ref 70–99)
GLUCOSE BLDC GLUCOMTR-MCNC: 87 MG/DL — SIGNIFICANT CHANGE UP (ref 70–99)

## 2023-07-18 PROCEDURE — 99233 SBSQ HOSP IP/OBS HIGH 50: CPT

## 2023-07-18 RX ADMIN — PANTOPRAZOLE SODIUM 40 MILLIGRAM(S): 20 TABLET, DELAYED RELEASE ORAL at 13:20

## 2023-07-18 RX ADMIN — SIMETHICONE 80 MILLIGRAM(S): 80 TABLET, CHEWABLE ORAL at 05:24

## 2023-07-18 RX ADMIN — Medication 650 MILLIGRAM(S): at 14:12

## 2023-07-18 RX ADMIN — PIPERACILLIN AND TAZOBACTAM 25 GRAM(S): 4; .5 INJECTION, POWDER, LYOPHILIZED, FOR SOLUTION INTRAVENOUS at 21:24

## 2023-07-18 RX ADMIN — SIMETHICONE 80 MILLIGRAM(S): 80 TABLET, CHEWABLE ORAL at 13:20

## 2023-07-18 RX ADMIN — SODIUM CHLORIDE 120 MILLILITER(S): 9 INJECTION, SOLUTION INTRAVENOUS at 02:24

## 2023-07-18 RX ADMIN — PIPERACILLIN AND TAZOBACTAM 25 GRAM(S): 4; .5 INJECTION, POWDER, LYOPHILIZED, FOR SOLUTION INTRAVENOUS at 05:25

## 2023-07-18 RX ADMIN — PIPERACILLIN AND TAZOBACTAM 25 GRAM(S): 4; .5 INJECTION, POWDER, LYOPHILIZED, FOR SOLUTION INTRAVENOUS at 13:20

## 2023-07-18 RX ADMIN — SIMETHICONE 80 MILLIGRAM(S): 80 TABLET, CHEWABLE ORAL at 21:24

## 2023-07-18 NOTE — PROGRESS NOTE ADULT - SUBJECTIVE AND OBJECTIVE BOX
[facility], Division of Infectious Diseases  AJ Chow Y. Patel, S. Shah, [facility]  [phone]    Name: ANGEL RAMAN  Age: 48y  Gender: Male  MRN: [id number]    Interval History:  Patient seen and examined at bedside  Febrile overnight  Notes reviewed    Antibiotics:  piperacillin/tazobactam IVPB.. 3.375 Gram(s) IV Intermittent every 8 hours      Medications:  acetaminophen     Tablet .. 650 milliGRAM(s) Oral every 6 hours PRN  dextrose 5%. 1000 milliLiter(s) IV Continuous <Continuous>  dextrose 5%. 1000 milliLiter(s) IV Continuous <Continuous>  dextrose 50% Injectable 25 Gram(s) IV Push once  dextrose 50% Injectable 25 Gram(s) IV Push once  dextrose 50% Injectable 12.5 Gram(s) IV Push once  dextrose Oral Gel 15 Gram(s) Oral once PRN  glucagon  Injectable 1 milliGRAM(s) IntraMuscular once  glucagon  Injectable 1 milliGRAM(s) IntraMuscular once  insulin lispro (ADMELOG) corrective regimen sliding scale   SubCutaneous at bedtime  insulin lispro (ADMELOG) corrective regimen sliding scale   SubCutaneous three times a day before meals  lactated ringers. 1000 milliLiter(s) IV Continuous <Continuous>  melatonin 3 milliGRAM(s) Oral at bedtime PRN  ondansetron Injectable 4 milliGRAM(s) IV Push every 8 hours PRN  pantoprazole  Injectable 40 milliGRAM(s) IV Push daily  piperacillin/tazobactam IVPB.. 3.375 Gram(s) IV Intermittent every 8 hours  simethicone 80 milliGRAM(s) Chew three times a day      Review of Systems:  Review of systems otherwise negative except as previously noted.    Allergies: No Known Allergies    For details regarding the patient's past medical history, social history, family history, and other miscellaneous elements, please refer the initial infectious diseases consultation and/or the admitting history and physical examination for this admission.    Objective:  Vitals:   T(C): 37.2 (07-18-23 @ 05:00), Max: 39 (07-17-23 @ 17:00)  HR: 96 (07-18-23 @ 05:00) (90 - 96)  BP: 103/66 (07-18-23 @ 05:00) (103/66 - 129/71)  RR: 18 (07-18-23 @ 05:00) (16 - 18)  SpO2: 96% (07-18-23 @ 05:00) (92% - 98%)    Physical Examination:  General: no acute distress  HEENT: NC/AT, EOMI  Cardio: S1, S2 heard, RRR, no murmurs  Resp: symmetric chest rise  Abd: soft, NT, ND  Ext: no edema or cyanosis  Skin: warm, dry, no visible rash      Laboratory Studies:  CBC:                       12.6   4.07  )-----------( 181      ( 17 Jul 2023 07:54 )             35.9     CMP: 07-16    x   |  x   |  x   ----------------------------<  x   4.5   |  x   |  x     Phos  1.7     07-17  Mg     2.2     07-17            Microbiology: reviewed    Culture - Urine (collected 07-15-23 @ 15:37)  Source: Clean Catch Clean Catch (Midstream)  Final Report (07-16-23 @ 23:37):    <10,000 CFU/mL Normal Urogenital Swati    Culture - Blood (collected 07-15-23 @ 12:59)  Source: .Blood Blood-Peripheral  Preliminary Report (07-17-23 @ 23:02):    No growth at 48 Hours    Culture - Blood (collected 07-15-23 @ 12:52)  Source: .Blood Blood-Peripheral  Preliminary Report (07-17-23 @ 23:02):    No growth at 48 Hours          Radiology: reviewed

## 2023-07-18 NOTE — CASE MANAGEMENT PROGRESS NOTE - NSCMPROGRESSNOTE_GEN_ALL_CORE
RN/CM noted that as per MD and staff, pt remains acute, pt still running temps (T 101.7- 102F), IV ABT continues, pt had vomiting episode- diet downgraded to full liquids. DC plan pending pt's hospital course. CM remains available and continues to follow case.

## 2023-07-18 NOTE — PROGRESS NOTE ADULT - ASSESSMENT
48M with PMHx of T2DM presenting with hyperglycemia (not DKA), abdominal pain, and nausea. CT A&P showing possible enteritis or partial SBO.    #Enteritis vs. partial SBO  - Diet as tolerated and currently on full liquid  - Had NG tube to intermittent suction, but removed on 7/16  - Surgery signed off  - GI following: outpatient CTE consideration  - On empiric Zosyn for bandemia (7/16-7/21 course planned)  - Initial BCx NGTD, follow up one sent on 7/17    #T2DM  - FS TID AC & insulin sliding scale  - A1c is 8.9 indicating he likely needs dual therapy. Personally I recommend metformin and DPP4, but he will follow up outpatient

## 2023-07-18 NOTE — PROGRESS NOTE ADULT - ASSESSMENT
49 yo M with PMH of DM who presented to ED with complaint of elevated glucose and nausea, abdominal pain, fever, and an episode of vomiting. Denies diarrhea.   ED course: CBC showed bandemia increased from 18 to 25. CT showed enteritis and unable to rule out partial SBO. findings d/w Dr. Correa who advised NG tube and admission for further testing.     Enteritis  - febrile with bandemia, abd pain and imaging and exam suggesting abd process/enteritis  - cx NGTD  - GI PCR negative  Recommendations:   - c/w zosyn x5 day course until 7/21  - serial abdominal exams  - trend temps/WBC  - appreciate surgery recs  - appreciate GI recs  - additional management per primary team    Infectious Diseases will continue to follow. Please call with any questions.   Luli Franz M.D.  [facility] Division of Infectious Diseases [phone]   47 yo M with PMH of DM who presented to ED with complaint of elevated glucose and nausea, abdominal pain, fever, and an episode of vomiting. Denies diarrhea.   ED course: CBC showed bandemia increased from 18 to 25. CT showed enteritis and unable to rule out partial SBO. findings d/w Dr. Correa who advised NG tube and admission for further testing.     Enteritis  Partial SBO  - febrile with bandemia, abd pain and imaging and exam suggesting abd process/enteritis  - cx NGTD  - GI PCR negative  Recommendations:   - c/w zosyn x5 day course until 7/21  - serial abdominal exams  - trend temps/WBC  - appreciate surgery recs  - appreciate GI recs  - additional management per primary team    Infectious Diseases will continue to follow. Please call with any questions.   Luli Franz M.D.  [facility] Division of Infectious Diseases [phone]

## 2023-07-18 NOTE — PROGRESS NOTE ADULT - SUBJECTIVE AND OBJECTIVE BOX
Patient is a 48y old  Male who presents with a chief complaint of Abdominal Pain (18 Jul 2023 04:17)    Mandarin : [id number]    SUBJECTIVE / OVERNIGHT EVENTS: Patient with nausea/vomiting today so maintained on full liquid diet. Denies feeling feverish, though had a high grade temp yesterday. Overall, he is improving, but not quickly. Surgery has signed off. Patient wants to know if this may be cancer. Explained to him that unlikely, though GI is recommended a CT enterography in several weeks after this acute issue resolves. He understands.    MEDICATIONS  (STANDING):  dextrose 5%. 1000 milliLiter(s) (100 mL/Hr) IV Continuous <Continuous>  dextrose 5%. 1000 milliLiter(s) (50 mL/Hr) IV Continuous <Continuous>  dextrose 50% Injectable 12.5 Gram(s) IV Push once  dextrose 50% Injectable 25 Gram(s) IV Push once  dextrose 50% Injectable 25 Gram(s) IV Push once  glucagon  Injectable 1 milliGRAM(s) IntraMuscular once  glucagon  Injectable 1 milliGRAM(s) IntraMuscular once  insulin lispro (ADMELOG) corrective regimen sliding scale   SubCutaneous at bedtime  insulin lispro (ADMELOG) corrective regimen sliding scale   SubCutaneous three times a day before meals  pantoprazole  Injectable 40 milliGRAM(s) IV Push daily  piperacillin/tazobactam IVPB.. 3.375 Gram(s) IV Intermittent every 8 hours  simethicone 80 milliGRAM(s) Chew three times a day    MEDICATIONS  (PRN):  acetaminophen     Tablet .. 650 milliGRAM(s) Oral every 6 hours PRN Temp greater or equal to 38C (100.4F), Mild Pain (1 - 3), Moderate Pain (4 - 6), Severe Pain (7 - 10)  dextrose Oral Gel 15 Gram(s) Oral once PRN Blood Glucose LESS THAN 70 milliGRAM(s)/deciliter  melatonin 3 milliGRAM(s) Oral at bedtime PRN Insomnia  ondansetron Injectable 4 milliGRAM(s) IV Push every 8 hours PRN Nausea and/or Vomiting      CAPILLARY BLOOD GLUCOSE      POCT Blood Glucose.: 108 mg/dL (18 Jul 2023 11:57)  POCT Blood Glucose.: 87 mg/dL (18 Jul 2023 07:36)  POCT Blood Glucose.: 108 mg/dL (17 Jul 2023 21:14)  POCT Blood Glucose.: 139 mg/dL (17 Jul 2023 16:40)    I&O's Summary    17 Jul 2023 07:01  -  18 Jul 2023 07:00  --------------------------------------------------------  IN: 2950 mL / OUT: 0 mL / NET: 2950 mL    18 Jul 2023 07:01  -  18 Jul 2023 15:44  --------------------------------------------------------  IN: 460 mL / OUT: 0 mL / NET: 460 mL        PHYSICAL EXAM:  Vital Signs Last 24 Hrs  T(C): 39.3 (18 Jul 2023 14:13), Max: 39.3 (18 Jul 2023 14:13)  T(F): 102.8 (18 Jul 2023 14:13), Max: 102.8 (18 Jul 2023 14:13)  HR: 106 (18 Jul 2023 14:13) (91 - 106)  BP: 126/75 (18 Jul 2023 14:13) (103/66 - 126/75)  BP(mean): --  RR: 18 (18 Jul 2023 14:13) (16 - 18)  SpO2: 97% (18 Jul 2023 14:13) (92% - 98%)    Parameters below as of 18 Jul 2023 05:00  Patient On (Oxygen Delivery Method): room air        GEN: male in NAD, appears comfortable, no diaphoresis  EYES: No scleral injection, PERRL, EOMI  ENTM: neck supple & symmetric without tracheal deviation, moist membranes, no gross hearing impairment, thyroid gland not enlarged  CV: +S1/S2, no m/r/g, no abdominal bruit, no LE edema  RESP: breathing comfortably, no respiratory accessory muscle use, CTAB, no w/r/r  GI: normoactive BS, soft, mild distention, midline abdominal tenderness, no rebounding/guarding, no palpable masses    LABS:                        12.6   4.07  )-----------( 181      ( 17 Jul 2023 07:54 )             35.9       Phos  1.7     07-17  Mg     2.2     07-17                  RADIOLOGY & ADDITIONAL TESTS:  Results Reviewed:   Imaging Personally Reviewed:  Electrocardiogram Personally Reviewed:    COORDINATION OF CARE:  Care Discussed with Consultants/Other Providers [Y/N]:  Prior or Outpatient Records Reviewed [Y/N]:

## 2023-07-19 LAB
ALBUMIN SERPL ELPH-MCNC: 1.4 G/DL — LOW (ref 3.3–5)
ALP SERPL-CCNC: 20 U/L — LOW (ref 30–120)
ALT FLD-CCNC: 20 U/L DA — SIGNIFICANT CHANGE UP (ref 10–60)
ANION GAP SERPL CALC-SCNC: 12 MMOL/L — SIGNIFICANT CHANGE UP (ref 5–17)
AST SERPL-CCNC: 20 U/L — SIGNIFICANT CHANGE UP (ref 10–40)
BILIRUB SERPL-MCNC: 1 MG/DL — SIGNIFICANT CHANGE UP (ref 0.2–1.2)
BUN SERPL-MCNC: 10 MG/DL — SIGNIFICANT CHANGE UP (ref 7–23)
CALCIUM SERPL-MCNC: 7.5 MG/DL — LOW (ref 8.4–10.5)
CHLORIDE SERPL-SCNC: 93 MMOL/L — LOW (ref 96–108)
CO2 SERPL-SCNC: 25 MMOL/L — SIGNIFICANT CHANGE UP (ref 22–31)
CREAT SERPL-MCNC: 0.59 MG/DL — SIGNIFICANT CHANGE UP (ref 0.5–1.3)
EGFR: 119 ML/MIN/1.73M2 — SIGNIFICANT CHANGE UP
GLUCOSE BLDC GLUCOMTR-MCNC: 118 MG/DL — HIGH (ref 70–99)
GLUCOSE BLDC GLUCOMTR-MCNC: 125 MG/DL — HIGH (ref 70–99)
GLUCOSE BLDC GLUCOMTR-MCNC: 132 MG/DL — HIGH (ref 70–99)
GLUCOSE BLDC GLUCOMTR-MCNC: 148 MG/DL — HIGH (ref 70–99)
GLUCOSE SERPL-MCNC: 118 MG/DL — HIGH (ref 70–99)
HCT VFR BLD CALC: 35.2 % — LOW (ref 39–50)
HGB BLD-MCNC: 12.7 G/DL — LOW (ref 13–17)
MAGNESIUM SERPL-MCNC: 2.3 MG/DL — SIGNIFICANT CHANGE UP (ref 1.6–2.6)
MCHC RBC-ENTMCNC: 30.8 PG — SIGNIFICANT CHANGE UP (ref 27–34)
MCHC RBC-ENTMCNC: 36.1 GM/DL — HIGH (ref 32–36)
MCV RBC AUTO: 85.2 FL — SIGNIFICANT CHANGE UP (ref 80–100)
NRBC # BLD: 0 /100 WBCS — SIGNIFICANT CHANGE UP (ref 0–0)
PHOSPHATE SERPL-MCNC: 2.1 MG/DL — LOW (ref 2.5–4.5)
PLATELET # BLD AUTO: 229 K/UL — SIGNIFICANT CHANGE UP (ref 150–400)
POTASSIUM SERPL-MCNC: 2.9 MMOL/L — CRITICAL LOW (ref 3.5–5.3)
POTASSIUM SERPL-SCNC: 2.9 MMOL/L — CRITICAL LOW (ref 3.5–5.3)
PROT SERPL-MCNC: 3.9 G/DL — LOW (ref 6–8.3)
RBC # BLD: 4.13 M/UL — LOW (ref 4.2–5.8)
RBC # FLD: 12.4 % — SIGNIFICANT CHANGE UP (ref 10.3–14.5)
SODIUM SERPL-SCNC: 130 MMOL/L — LOW (ref 135–145)
WBC # BLD: 7.73 K/UL — SIGNIFICANT CHANGE UP (ref 3.8–10.5)
WBC # FLD AUTO: 7.73 K/UL — SIGNIFICANT CHANGE UP (ref 3.8–10.5)

## 2023-07-19 PROCEDURE — 99232 SBSQ HOSP IP/OBS MODERATE 35: CPT

## 2023-07-19 PROCEDURE — 74019 RADEX ABDOMEN 2 VIEWS: CPT | Mod: 26

## 2023-07-19 PROCEDURE — 99233 SBSQ HOSP IP/OBS HIGH 50: CPT

## 2023-07-19 RX ORDER — SODIUM CHLORIDE 9 MG/ML
1000 INJECTION, SOLUTION INTRAVENOUS
Refills: 0 | Status: DISCONTINUED | OUTPATIENT
Start: 2023-07-19 | End: 2023-07-19

## 2023-07-19 RX ORDER — POTASSIUM CHLORIDE 20 MEQ
10 PACKET (EA) ORAL
Refills: 0 | Status: COMPLETED | OUTPATIENT
Start: 2023-07-19 | End: 2023-07-19

## 2023-07-19 RX ORDER — VANCOMYCIN HCL 1 G
1000 VIAL (EA) INTRAVENOUS EVERY 12 HOURS
Refills: 0 | Status: DISCONTINUED | OUTPATIENT
Start: 2023-07-19 | End: 2023-07-21

## 2023-07-19 RX ORDER — ACETAMINOPHEN 500 MG
1000 TABLET ORAL ONCE
Refills: 0 | Status: COMPLETED | OUTPATIENT
Start: 2023-07-19 | End: 2023-07-19

## 2023-07-19 RX ORDER — DEXTROSE MONOHYDRATE, SODIUM CHLORIDE, AND POTASSIUM CHLORIDE 50; .745; 4.5 G/1000ML; G/1000ML; G/1000ML
1000 INJECTION, SOLUTION INTRAVENOUS
Refills: 0 | Status: DISCONTINUED | OUTPATIENT
Start: 2023-07-19 | End: 2023-07-23

## 2023-07-19 RX ORDER — INSULIN LISPRO 100/ML
VIAL (ML) SUBCUTANEOUS EVERY 6 HOURS
Refills: 0 | Status: DISCONTINUED | OUTPATIENT
Start: 2023-07-20 | End: 2023-07-27

## 2023-07-19 RX ADMIN — Medication 100 MILLIEQUIVALENT(S): at 10:47

## 2023-07-19 RX ADMIN — SIMETHICONE 80 MILLIGRAM(S): 80 TABLET, CHEWABLE ORAL at 21:07

## 2023-07-19 RX ADMIN — DEXTROSE MONOHYDRATE, SODIUM CHLORIDE, AND POTASSIUM CHLORIDE 100 MILLILITER(S): 50; .745; 4.5 INJECTION, SOLUTION INTRAVENOUS at 21:07

## 2023-07-19 RX ADMIN — Medication 1000 MILLIGRAM(S): at 17:12

## 2023-07-19 RX ADMIN — Medication 400 MILLIGRAM(S): at 15:00

## 2023-07-19 RX ADMIN — Medication 100 MILLIEQUIVALENT(S): at 09:27

## 2023-07-19 RX ADMIN — PIPERACILLIN AND TAZOBACTAM 25 GRAM(S): 4; .5 INJECTION, POWDER, LYOPHILIZED, FOR SOLUTION INTRAVENOUS at 13:38

## 2023-07-19 RX ADMIN — Medication 100 MILLIEQUIVALENT(S): at 12:25

## 2023-07-19 RX ADMIN — PIPERACILLIN AND TAZOBACTAM 25 GRAM(S): 4; .5 INJECTION, POWDER, LYOPHILIZED, FOR SOLUTION INTRAVENOUS at 05:51

## 2023-07-19 RX ADMIN — Medication 250 MILLIGRAM(S): at 17:15

## 2023-07-19 RX ADMIN — SODIUM CHLORIDE 100 MILLILITER(S): 9 INJECTION, SOLUTION INTRAVENOUS at 08:50

## 2023-07-19 RX ADMIN — Medication 3 MILLIGRAM(S): at 01:53

## 2023-07-19 RX ADMIN — PANTOPRAZOLE SODIUM 40 MILLIGRAM(S): 20 TABLET, DELAYED RELEASE ORAL at 13:38

## 2023-07-19 RX ADMIN — PIPERACILLIN AND TAZOBACTAM 25 GRAM(S): 4; .5 INJECTION, POWDER, LYOPHILIZED, FOR SOLUTION INTRAVENOUS at 21:08

## 2023-07-19 RX ADMIN — SIMETHICONE 80 MILLIGRAM(S): 80 TABLET, CHEWABLE ORAL at 05:51

## 2023-07-19 NOTE — PROGRESS NOTE ADULT - SUBJECTIVE AND OBJECTIVE BOX
CC: DM with uncontrolled blood sugar .  Nausea vomiting fever .  Intermittent fever.  Today 100.5 NPO for vomiting .    HPI:  Patient is a 49 yo M with PMH of IDDM who presented to ED yesterday with complaint of elevated glucose and nausea. He reported abdominal pain, fever, and an episode of vomiting. He reports he stopped his insulin 2 weeks ago as he says his sugars were under control and no longer needed it. He had a negative workup for DKA and was given oral potassium and was discharged after feeling better with instructions to resume his insulin and followup with his PCP. He returned today   returned today with reports of fever and worsening abdominal pain. Denies diarrhea. He reports he is a cigar smoker.     ED course: CBC shows bandemia increased from 18 to 25. CT showed enteritis and unable to rule out partial SBO. findings d/w Dr. Correa who advised NG tube and admission for further testing. Patient recieved zosyn and GI Dr. Schafer made aware.        PMH: IDDM  PSH: denies abdominal surgeries  Ax: NKDA  PCP: Denae Hernandez     (15 Jul 2023 18:33)    REVIEW OF SYSTEMS:    Patient denied fever, chills, abdominal pain, nausea, vomiting, cough, shortness of breath, chest pain or palpitations    Vital Signs Last 24 Hrs  T(C): 38.1 (19 Jul 2023 12:53), Max: 39.3 (18 Jul 2023 14:13)  T(F): 100.5 (19 Jul 2023 12:53), Max: 102.8 (18 Jul 2023 14:13)  HR: 95 (19 Jul 2023 12:53) (93 - 106)  BP: 122/77 (19 Jul 2023 12:53) (101/62 - 133/81)  BP(mean): --  RR: 18 (19 Jul 2023 12:53) (15 - 18)  SpO2: 97% (19 Jul 2023 12:53) (96% - 98%)    Parameters below as of 19 Jul 2023 12:53  Patient On (Oxygen Delivery Method): room air    I&O's Summary    18 Jul 2023 07:01  -  19 Jul 2023 07:00  --------------------------------------------------------  IN: 1040 mL / OUT: 200 mL / NET: 840 mL    19 Jul 2023 07:01  -  19 Jul 2023 13:31  --------------------------------------------------------  IN: 300 mL / OUT: 0 mL / NET: 300 mL      PHYSICAL EXAM:  GENERAL: NAD, well-groomed  HEENT: PERRL, +EOMI, anicteric, no [place]  NECK: Supple, No JVD   CHEST/LUNG: CTA bilaterally; Normal effort  HEART: S1S2 Normal intensity, no murmurs, gallops or rubs noted  ABDOMEN: Soft, BS Normoactive, NT, ND, no HSM noted  EXTREMITIES:  2+ radial and DP pulses noted, no clubbing, cyanosis, or edema noted, FROM x 4  SKIN: No rashes or lesions noted  NEURO: A&Ox3, no focal deficits noted, CN II-XII intact  PSYCH: normal mood and affect; insight/judgement appropriate  LABS:                        12.7   7.73  )-----------( 229      ( 19 Jul 2023 08:11 )             35.2     07-19    130<L>  |  93<L>  |  10  ----------------------------<  118<H>  2.9<LL>   |  25  |  0.59    Ca    7.5<L>      19 Jul 2023 08:11  Phos  2.1     07-19  Mg     2.3     07-19    TPro  3.9<L>  /  Alb  1.4<L>  /  TBili  1.0  /  DBili  x   /  AST  20  /  ALT  20  /  AlkPhos  20<L>  07-19      Urinalysis Basic - ( 19 Jul 2023 08:11 )    Color: x / Appearance: x / SG: x / pH: x  Gluc: 118 mg/dL / Ketone: x  / Bili: x / Urobili: x   Blood: x / Protein: x / Nitrite: x   Leuk Esterase: x / RBC: x / WBC x   Sq Epi: x / Non Sq Epi: x / Bacteria: x      RADIOLOGY & ADDITIONAL TESTS:    MEDICATIONS:  MEDICATIONS  (STANDING):  dextrose 5%. 1000 milliLiter(s) (100 mL/Hr) IV Continuous <Continuous>  dextrose 5%. 1000 milliLiter(s) (50 mL/Hr) IV Continuous <Continuous>  dextrose 50% Injectable 12.5 Gram(s) IV Push once  dextrose 50% Injectable 25 Gram(s) IV Push once  dextrose 50% Injectable 25 Gram(s) IV Push once  glucagon  Injectable 1 milliGRAM(s) IntraMuscular once  glucagon  Injectable 1 milliGRAM(s) IntraMuscular once  insulin lispro (ADMELOG) corrective regimen sliding scale   SubCutaneous at bedtime  insulin lispro (ADMELOG) corrective regimen sliding scale   SubCutaneous three times a day before meals  lactated ringers. 1000 milliLiter(s) (100 mL/Hr) IV Continuous <Continuous>  pantoprazole  Injectable 40 milliGRAM(s) IV Push daily  piperacillin/tazobactam IVPB.. 3.375 Gram(s) IV Intermittent every 8 hours  simethicone 80 milliGRAM(s) Chew three times a day  vancomycin  IVPB 1000 milliGRAM(s) IV Intermittent every 12 hours    MEDICATIONS  (PRN):  acetaminophen     Tablet .. 650 milliGRAM(s) Oral every 6 hours PRN Temp greater or equal to 38C (100.4F), Mild Pain (1 - 3), Moderate Pain (4 - 6), Severe Pain (7 - 10)  dextrose Oral Gel 15 Gram(s) Oral once PRN Blood Glucose LESS THAN 70 milliGRAM(s)/deciliter  melatonin 3 milliGRAM(s) Oral at bedtime PRN Insomnia  ondansetron Injectable 4 milliGRAM(s) IV Push every 8 hours PRN Nausea and/or Vomiting

## 2023-07-19 NOTE — CHART NOTE - NSCHARTNOTEFT_GEN_A_CORE
Surgery recalled following episode of nausea and vomiting earlier in day.  Mr. Bassett admits to mild abdominal discomfort now, but not parag pain.  Per his report to me, he continues to pass flatus and liquid BM's.    Low grade temperature elevation noted.  However, vitals reassuring otherwise today.  On exam appears mildly anxious/ uncomfortable, but resting quietly and in no acute distress.  Abdomen softly distended, but non tense.  Minimal tenderness diffusely to deeper palpation without any guarding or rebound or referred pain.    Labs somewhat reassuring today with normal WBCs, no acidosis, though electrolytes require supplementation/ optimization.  Plain films of abdomen and pelvis performed with report and images personally reviewed:  -"suggest small bowel obstruction", though without free air and some air present in colon still.    Imp/Plan-    Original admission/ presentation/ advanced imaging concerning for enteritis.  Patient clinically well and surgically stable at present.  However, concern for low grade or partial SBO.  Recommending NG- though he refuses at present.  Given passage of flatus and liquid BM's, further observation while NPO reasonable.  However, patient encouraged and RN directed to insert NG for any new emesis or further nausea/ vomiting.    Pending progress, can consider either SBFT in am versus interval CT imaging with contrast for diagnostic +/- therapeutic benefit.  Surgery re-engaged and following.  Reviewed with patient in detail, original Surgery consultant, Surgical PA's team as well as today's RN.    Please note that more than 35 minutes was spent in face to face time with greater than 50% in counseling and coordination of care.

## 2023-07-19 NOTE — PROGRESS NOTE ADULT - ASSESSMENT
48M with PMHx of T2DM presenting with hyperglycemia (not DKA), abdominal pain, and nausea. CT A&P showing possible enteritis or partial SBO.    Enteritis vs. partial SBO  Etiology likely gastroparesis from DM, possible viral or bacterial infection and less likely mechanical obstruction.  No history of previous surgery or bowel inflammatory process.   NOP for now   To repeat abd pelvic CT 7/20   Refusing further NGT   Continue IVF hdyration   NPO   ID doing a trial of antibiotic vancomycin and Zosyn for suspected infection       #T2DM  - FS TID AC & insulin sliding scale  - A1c is 8.9 indicating he likely needs dual therapy. Personally I recommend metformin and DPP4, but he will follow up outpatient       Statement Selected

## 2023-07-19 NOTE — PROGRESS NOTE ADULT - ASSESSMENT
47 yo M with PMH of DM who presented to ED with complaint of elevated glucose and nausea, abdominal pain, fever, and an episode of vomiting. Denies diarrhea.   ED course: CBC showed bandemia increased from 18 to 25. CT showed enteritis and unable to rule out partial SBO. findings d/w Dr. Correa who advised NG tube and admission for further testing.     Enteritis  Partial SBO  - febrile with bandemia, abd pain and imaging and exam suggesting abd process/enteritis  - Bcx and repeat BCx NGTD  - GI PCR negative  Continues to remain febrile daily, now w/ persistent n/v  Recommendations:   - c/w zosyn for now  - adding vancomycin to see if any clinical improvement   - serial abdominal exams  - trend temps/WBC  - appreciate surgery recs  - appreciate GI recs  - additional management per primary team    D/w Dr. Saldaña    Infectious Diseases will continue to follow. Please call with any questions.   Luli Franz M.D.  [facility] Division of Infectious Diseases [phone]

## 2023-07-19 NOTE — PROVIDER CONTACT NOTE (CHANGE IN STATUS NOTIFICATION) - RECOMMENDATIONS
Per Dr. Schafer, downgrade diet to full NPO  Dr. Schafer will contact Dr. Correa (surgery) to re-consult

## 2023-07-19 NOTE — PROVIDER CONTACT NOTE (OTHER) - RECOMMENDATIONS
Per Dr. Correa, keep patient NPO with IV fluids. May need NG tube reinsertion if vomitting persists. No plan for surgery. Dr. Correa made aware that patient does not want to have NG tube reinserted

## 2023-07-19 NOTE — PROGRESS NOTE ADULT - SUBJECTIVE AND OBJECTIVE BOX
CC: Intermittent fever 100.5 at this time .  Vomiting . Not keeping oral diet and made NPO   HPI:  Patient is a 47 yo M with PMH of IDDM who presented to ED yesterday with complaint of elevated glucose and nausea. He reported abdominal pain, fever, and an episode of vomiting. He reports he stopped his insulin 2 weeks ago as he says his sugars were under control and no longer needed it. He had a negative workup for DKA and was given oral potassium and was discharged after feeling better with instructions to resume his insulin and followup with his PCP. He returned today   returned today with reports of fever and worsening abdominal pain. Denies diarrhea. He reports he is a cigar smoker.     ED course: CBC shows bandemia increased from 18 to 25. CT showed enteritis and unable to rule out partial SBO. findings d/w Dr. Correa who advised NG tube and admission for further testing. Patient recieved zosyn and GI Dr. Schafer made aware.        PMH: IDDM  PSH: denies abdominal surgeries  Ax: NKDA  PCP: Denae Hernandez     (15 Jul 2023 18:33)    REVIEW OF SYSTEMS:    Patient denied fever, chills, abdominal pain, nausea, vomiting, cough, shortness of breath, chest pain or palpitations    Vital Signs Last 24 Hrs  T(C): 38.1 (19 Jul 2023 12:53), Max: 39.3 (18 Jul 2023 14:13)  T(F): 100.5 (19 Jul 2023 12:53), Max: 102.8 (18 Jul 2023 14:13)  HR: 95 (19 Jul 2023 12:53) (93 - 106)  BP: 122/77 (19 Jul 2023 12:53) (101/62 - 133/81)  BP(mean): --  RR: 18 (19 Jul 2023 12:53) (15 - 18)  SpO2: 97% (19 Jul 2023 12:53) (96% - 98%)    Parameters below as of 19 Jul 2023 12:53  Patient On (Oxygen Delivery Method): room air    I&O's Summary    18 Jul 2023 07:01  -  19 Jul 2023 07:00  --------------------------------------------------------  IN: 1040 mL / OUT: 200 mL / NET: 840 mL    19 Jul 2023 07:01  -  19 Jul 2023 13:21  --------------------------------------------------------  IN: 300 mL / OUT: 0 mL / NET: 300 mL      PHYSICAL EXAM:  GENERAL: NAD, well-groomed  HEENT: PERRL, +EOMI, anicteric, no [place]  NECK: Supple, No JVD   CHEST/LUNG: CTA bilaterally; Normal effort  HEART: S1S2 Normal intensity, no murmurs, gallops or rubs noted  ABDOMEN: Soft, BS Normoactive, NT, ND, no HSM noted  EXTREMITIES:  2+ radial and DP pulses noted, no clubbing, cyanosis, or edema noted, FROM x 4  SKIN: No rashes or lesions noted  NEURO: A&Ox3, no focal deficits noted, CN II-XII intact  PSYCH: normal mood and affect; insight/judgement appropriate  LABS:                        12.7   7.73  )-----------( 229      ( 19 Jul 2023 08:11 )             35.2     07-19    130<L>  |  93<L>  |  10  ----------------------------<  118<H>  2.9<LL>   |  25  |  0.59    Ca    7.5<L>      19 Jul 2023 08:11  Phos  2.1     07-19  Mg     2.3     07-19    TPro  3.9<L>  /  Alb  1.4<L>  /  TBili  1.0  /  DBili  x   /  AST  20  /  ALT  20  /  AlkPhos  20<L>  07-19      Urinalysis Basic - ( 19 Jul 2023 08:11 )    Color: x / Appearance: x / SG: x / pH: x  Gluc: 118 mg/dL / Ketone: x  / Bili: x / Urobili: x   Blood: x / Protein: x / Nitrite: x   Leuk Esterase: x / RBC: x / WBC x   Sq Epi: x / Non Sq Epi: x / Bacteria: x      RADIOLOGY & ADDITIONAL TESTS:    MEDICATIONS:  MEDICATIONS  (STANDING):  dextrose 5%. 1000 milliLiter(s) (50 mL/Hr) IV Continuous <Continuous>  dextrose 5%. 1000 milliLiter(s) (100 mL/Hr) IV Continuous <Continuous>  dextrose 50% Injectable 25 Gram(s) IV Push once  dextrose 50% Injectable 25 Gram(s) IV Push once  dextrose 50% Injectable 12.5 Gram(s) IV Push once  glucagon  Injectable 1 milliGRAM(s) IntraMuscular once  glucagon  Injectable 1 milliGRAM(s) IntraMuscular once  insulin lispro (ADMELOG) corrective regimen sliding scale   SubCutaneous three times a day before meals  insulin lispro (ADMELOG) corrective regimen sliding scale   SubCutaneous at bedtime  lactated ringers. 1000 milliLiter(s) (100 mL/Hr) IV Continuous <Continuous>  pantoprazole  Injectable 40 milliGRAM(s) IV Push daily  piperacillin/tazobactam IVPB.. 3.375 Gram(s) IV Intermittent every 8 hours  simethicone 80 milliGRAM(s) Chew three times a day  vancomycin  IVPB 1000 milliGRAM(s) IV Intermittent every 12 hours    MEDICATIONS  (PRN):  acetaminophen     Tablet .. 650 milliGRAM(s) Oral every 6 hours PRN Temp greater or equal to 38C (100.4F), Mild Pain (1 - 3), Moderate Pain (4 - 6), Severe Pain (7 - 10)  dextrose Oral Gel 15 Gram(s) Oral once PRN Blood Glucose LESS THAN 70 milliGRAM(s)/deciliter  melatonin 3 milliGRAM(s) Oral at bedtime PRN Insomnia  ondansetron Injectable 4 milliGRAM(s) IV Push every 8 hours PRN Nausea and/or Vomiting

## 2023-07-19 NOTE — CHART NOTE - NSCHARTNOTEFT_GEN_A_CORE
Assessment: Per H&P "Patient is a 47 yo M with PMH of IDDM who presented to ED yesterday with complaint of elevated glucose and nausea. He reported abdominal pain, fever, and an episode of vomiting. He reports he stopped his insulin 2 weeks ago as he says his sugars were under control and no longer needed it. He had a negative workup for DKA and was given oral potassium and was discharged after feeling better with instructions to resume his insulin and followup with his PCP. He returned today   returned today with reports of fever and worsening abdominal pain. Denies diarrhea. He reports he is a cigar smoker. "      Pt seen for nutrition follow-up. Visited patient in room, presents with poor appetite/po intake, consuming >% of meals. Denies n/v/d/c, last BM  . Pertinent medications/nutrition labs reviewed; FS  x24hr, hyponatremia, hypokalemia, low Phos likely r/t to poor po intake and emesis; s/p repletion, lias    Factors impacting intake: [ ] none [ ] nausea  [ ] vomiting [ ] diarrhea [ ] constipation  [ ]chewing problems [ ] swallowing issues  [ ] other:     Diet Prescription: Diet, NPO (23 @ 11:37)    Intake:     Daily Weight in k (2023 05:22)    % Weight Change    Pertinent Medications: MEDICATIONS  (STANDING):  dextrose 5%. 1000 milliLiter(s) (50 mL/Hr) IV Continuous <Continuous>  dextrose 5%. 1000 milliLiter(s) (100 mL/Hr) IV Continuous <Continuous>  dextrose 50% Injectable 25 Gram(s) IV Push once  dextrose 50% Injectable 25 Gram(s) IV Push once  dextrose 50% Injectable 12.5 Gram(s) IV Push once  glucagon  Injectable 1 milliGRAM(s) IntraMuscular once  glucagon  Injectable 1 milliGRAM(s) IntraMuscular once  insulin lispro (ADMELOG) corrective regimen sliding scale   SubCutaneous three times a day before meals  insulin lispro (ADMELOG) corrective regimen sliding scale   SubCutaneous at bedtime  lactated ringers. 1000 milliLiter(s) (100 mL/Hr) IV Continuous <Continuous>  pantoprazole  Injectable 40 milliGRAM(s) IV Push daily  piperacillin/tazobactam IVPB.. 3.375 Gram(s) IV Intermittent every 8 hours  potassium chloride  10 mEq/100 mL IVPB 10 milliEquivalent(s) IV Intermittent every 1 hour  simethicone 80 milliGRAM(s) Chew three times a day  vancomycin  IVPB 1000 milliGRAM(s) IV Intermittent every 12 hours    MEDICATIONS  (PRN):  acetaminophen     Tablet .. 650 milliGRAM(s) Oral every 6 hours PRN Temp greater or equal to 38C (100.4F), Mild Pain (1 - 3), Moderate Pain (4 - 6), Severe Pain (7 - 10)  dextrose Oral Gel 15 Gram(s) Oral once PRN Blood Glucose LESS THAN 70 milliGRAM(s)/deciliter  melatonin 3 milliGRAM(s) Oral at bedtime PRN Insomnia  ondansetron Injectable 4 milliGRAM(s) IV Push every 8 hours PRN Nausea and/or Vomiting    Pertinent Labs:  Na130 mmol/L<L> Glu 118 mg/dL<H> K+ 2.9 mmol/L<LL> Cr  0.59 mg/dL BUN 10 mg/dL  Phos 2.1 mg/dL<L>  Alb 1.4 g/dL<L>     CAPILLARY BLOOD GLUCOSE      POCT Blood Glucose.: 132 mg/dL (2023 12:00)  POCT Blood Glucose.: 118 mg/dL (2023 07:38)  POCT Blood Glucose.: 121 mg/dL (2023 20:56)  POCT Blood Glucose.: 127 mg/dL (2023 17:12)      Edema per flowsheets:  Skin:       Estimated Needs:   [ ] no change since previous assessment  [ ] recalculated:     Previous Nutrition Diagnosis:   [ ] Inadequate Energy Intake [ ]Inadequate Oral Intake [ ] Excessive Energy Intake   [ ] Underweight [ ] Increased Nutrient Needs [ ] Overweight/Obesity [ ] Altered Nutrition related lab values  [ ] Altered GI Function [ ] Unintended Weight Loss [ ] Food & Nutrition Related Knowledge Deficit [ ] Malnutrition     Nutrition Diagnosis is [ ] ongoing  [ ] resolved [ ] not applicable     New Nutrition Diagnosis: [ ] not applicable       Interventions:   Recommend  [ ] Change Diet To:  [ ] Nutrition Supplement  [ ] Nutrition Support  [ ] Other:     Monitoring and Evaluation:   [X ] Intake [ x ] Tolerance to diet prescription [ x ] weights [ x ] labs[ x ] follow up per protocol  [ ] other: Assessment: Per H&P "Patient is a 47 yo M with PMH of IDDM who presented to ED yesterday with complaint of elevated glucose and nausea. He reported abdominal pain, fever, and an episode of vomiting. He reports he stopped his insulin 2 weeks ago as he says his sugars were under control and no longer needed it. He had a negative workup for DKA and was given oral potassium and was discharged after feeling better with instructions to resume his insulin and followup with his PCP. He returned today   returned today with reports of fever and worsening abdominal pain. Denies diarrhea. He reports he is a cigar smoker. "      Pt seen for nutrition follow-up. Visited patient in room, presents with poor appetite/po intake, vomited, unable to tolerate po diet, made NPO on  per team. Last BM . Pertinent medications/nutrition labs reviewed; FS  x24hr, hyponatremia, hypokalemia, low Phos likely r/t to poor po intake and emesis; s/p repletion, In house ordered for lispro sliding scale to aid in glucose management. Also receiving LR. Given NPO status, no oral nutrition intervention at this time, RD to continue to monitor nutrition status per protocol.     Factors impacting intake: [ ] none [ ] nausea  [ ] vomiting [ ] diarrhea [ ] constipation  [ ]chewing problems [ ] swallowing issues  [ x] other: NPO    Diet Prescription: Diet, NPO (23 @ 11:37)    Intake: poor, made NPO x1 day    Daily Weight in k (2023 05:22)  admt wt 79.7kg  % Weight Change    Pertinent Medications: MEDICATIONS  (STANDING):  dextrose 5%. 1000 milliLiter(s) (50 mL/Hr) IV Continuous <Continuous>  dextrose 5%. 1000 milliLiter(s) (100 mL/Hr) IV Continuous <Continuous>  dextrose 50% Injectable 25 Gram(s) IV Push once  dextrose 50% Injectable 25 Gram(s) IV Push once  dextrose 50% Injectable 12.5 Gram(s) IV Push once  glucagon  Injectable 1 milliGRAM(s) IntraMuscular once  glucagon  Injectable 1 milliGRAM(s) IntraMuscular once  insulin lispro (ADMELOG) corrective regimen sliding scale   SubCutaneous three times a day before meals  insulin lispro (ADMELOG) corrective regimen sliding scale   SubCutaneous at bedtime  lactated ringers. 1000 milliLiter(s) (100 mL/Hr) IV Continuous <Continuous>  pantoprazole  Injectable 40 milliGRAM(s) IV Push daily  piperacillin/tazobactam IVPB.. 3.375 Gram(s) IV Intermittent every 8 hours  potassium chloride  10 mEq/100 mL IVPB 10 milliEquivalent(s) IV Intermittent every 1 hour  simethicone 80 milliGRAM(s) Chew three times a day  vancomycin  IVPB 1000 milliGRAM(s) IV Intermittent every 12 hours    MEDICATIONS  (PRN):  acetaminophen     Tablet .. 650 milliGRAM(s) Oral every 6 hours PRN Temp greater or equal to 38C (100.4F), Mild Pain (1 - 3), Moderate Pain (4 - 6), Severe Pain (7 - 10)  dextrose Oral Gel 15 Gram(s) Oral once PRN Blood Glucose LESS THAN 70 milliGRAM(s)/deciliter  melatonin 3 milliGRAM(s) Oral at bedtime PRN Insomnia  ondansetron Injectable 4 milliGRAM(s) IV Push every 8 hours PRN Nausea and/or Vomiting    Pertinent Labs:  Na130 mmol/L<L> Glu 118 mg/dL<H> K+ 2.9 mmol/L<LL> Cr  0.59 mg/dL BUN 10 mg/dL  Phos 2.1 mg/dL<L>  Alb 1.4 g/dL<L>     CAPILLARY BLOOD GLUCOSE      POCT Blood Glucose.: 132 mg/dL (2023 12:00)  POCT Blood Glucose.: 118 mg/dL (2023 07:38)  POCT Blood Glucose.: 121 mg/dL (2023 20:56)  POCT Blood Glucose.: 127 mg/dL (2023 17:12)      No edema or pressure injury documented per flowsheets.     Estimated Needs:   [x ] no change since previous assessment  [ ] recalculated:     Previous Nutrition Diagnosis:   [ ] Inadequate Energy Intake [x ]Inadequate Oral Intake [ ] Excessive Energy Intake   [ ] Underweight [ ] Increased Nutrient Needs [ ] Overweight/Obesity [ ] Altered Nutrition related lab values  [ ] Altered GI Function [ ] Unintended Weight Loss [ ] Food & Nutrition Related Knowledge Deficit [ ] Malnutrition     Nutrition Diagnosis is [ x] ongoing  [ ] resolved [ ] not applicable     New Nutrition Diagnosis: [x ] not applicable       Interventions: Given NPO status, no oral nutrition intervention at this time, RD to continue to monitor nutrition status per protocol.   Recommend  [ ] Change Diet To:  [ ] Nutrition Supplement  [ ] Nutrition Support  [ ] Other:     Monitoring and Evaluation:   [X ] Intake [ x ] Tolerance to diet prescription [ x ] weights [ x ] labs[ x ] follow up per protocol  [ ] other:

## 2023-07-19 NOTE — PROVIDER CONTACT NOTE (CHANGE IN STATUS NOTIFICATION) - SITUATION
Relaying 1 episode of thin brown liquid vomitus this AM, hospitalist Dr. Saldaña and Dr. Franz ID already aware

## 2023-07-19 NOTE — PROGRESS NOTE ADULT - SUBJECTIVE AND OBJECTIVE BOX
INTERVAL HPI/OVERNIGHT EVENTS:  2 BM yesterday  1 small today  AXR reviewed  N/V this AM    MEDICATIONS  (STANDING):  dextrose 5%. 1000 milliLiter(s) (100 mL/Hr) IV Continuous <Continuous>  dextrose 5%. 1000 milliLiter(s) (50 mL/Hr) IV Continuous <Continuous>  dextrose 50% Injectable 12.5 Gram(s) IV Push once  dextrose 50% Injectable 25 Gram(s) IV Push once  dextrose 50% Injectable 25 Gram(s) IV Push once  glucagon  Injectable 1 milliGRAM(s) IntraMuscular once  glucagon  Injectable 1 milliGRAM(s) IntraMuscular once  insulin lispro (ADMELOG) corrective regimen sliding scale   SubCutaneous at bedtime  insulin lispro (ADMELOG) corrective regimen sliding scale   SubCutaneous three times a day before meals  lactated ringers. 1000 milliLiter(s) (100 mL/Hr) IV Continuous <Continuous>  pantoprazole  Injectable 40 milliGRAM(s) IV Push daily  piperacillin/tazobactam IVPB.. 3.375 Gram(s) IV Intermittent every 8 hours  simethicone 80 milliGRAM(s) Chew three times a day  vancomycin  IVPB 1000 milliGRAM(s) IV Intermittent every 12 hours    MEDICATIONS  (PRN):  acetaminophen     Tablet .. 650 milliGRAM(s) Oral every 6 hours PRN Temp greater or equal to 38C (100.4F), Mild Pain (1 - 3), Moderate Pain (4 - 6), Severe Pain (7 - 10)  dextrose Oral Gel 15 Gram(s) Oral once PRN Blood Glucose LESS THAN 70 milliGRAM(s)/deciliter  melatonin 3 milliGRAM(s) Oral at bedtime PRN Insomnia  ondansetron Injectable 4 milliGRAM(s) IV Push every 8 hours PRN Nausea and/or Vomiting      Allergies    No Known Allergies    Intolerances        Review of Systems:    General:  No wt loss, fevers, chills, night sweats,fatigue,   Eyes:  Good vision, no reported pain  ENT:  No sore throat, pain, runny nose, dysphagia  CV:  No pain, palpitatioins, hypo/hypertension  Resp:  No dyspnea, cough, tachypnea, wheezing  GI:  No pain, No nausea, No vomiting, No diarrhea, No constipatiion, No weight loss, No fever, No pruritis, No rectal bleeding, No tarry stools, No dysphagia,  :  No pain, bleeding, incontinence, nocturia  Muscle:  No pain, weakness  Neuro:  No weakness, tingling, memory problems  Psych:  No fatigue, insomnia, mood problems, depression  Endocrine:  No polyuria, polydypsia, cold/heat intolerance  Heme:  No petechiae, ecchymosis, easy bruisability  Skin:  No rash, tattoos, scars, edema      Vital Signs Last 24 Hrs  T(C): 37 (19 Jul 2023 17:12), Max: 38.1 (18 Jul 2023 18:47)  T(F): 98.6 (19 Jul 2023 17:12), Max: 100.5 (18 Jul 2023 18:47)  HR: 85 (19 Jul 2023 17:12) (85 - 102)  BP: 114/67 (19 Jul 2023 17:12) (107/68 - 133/81)  BP(mean): --  RR: 18 (19 Jul 2023 17:12) (15 - 18)  SpO2: 97% (19 Jul 2023 17:12) (97% - 98%)    Parameters below as of 19 Jul 2023 17:12  Patient On (Oxygen Delivery Method): room air        PHYSICAL EXAM:    Constitutional: NAD, well-developed  HEENT: EOMI, throat clear  Neck: No LAD, supple  Respiratory: CTA and P  Cardiovascular: S1 and S2, RRR, no M  Gastrointestinal: BS+, soft, NT/ND, neg HSM,  Extremities: No peripheral edema, neg clubing, cyanosis  Vascular: 2+ peripheral pulses  Neurological: A/O x 3, no focal deficits  Psychiatric: Normal mood, normal affect  Skin: No rashes      LABS:                        12.7   7.73  )-----------( 229      ( 19 Jul 2023 08:11 )             35.2     07-19    130<L>  |  93<L>  |  10  ----------------------------<  118<H>  2.9<LL>   |  25  |  0.59    Ca    7.5<L>      19 Jul 2023 08:11  Phos  2.1     07-19  Mg     2.3     07-19    TPro  3.9<L>  /  Alb  1.4<L>  /  TBili  1.0  /  DBili  x   /  AST  20  /  ALT  20  /  AlkPhos  20<L>  07-19      Urinalysis Basic - ( 19 Jul 2023 08:11 )    Color: x / Appearance: x / SG: x / pH: x  Gluc: 118 mg/dL / Ketone: x  / Bili: x / Urobili: x   Blood: x / Protein: x / Nitrite: x   Leuk Esterase: x / RBC: x / WBC x   Sq Epi: x / Non Sq Epi: x / Bacteria: x        RADIOLOGY & ADDITIONAL TESTS:

## 2023-07-19 NOTE — SOCIAL WORK PROGRESS NOTE - NSSWPROGRESSNOTE_GEN_ALL_CORE
Per discussion w/ inpatient interdisciplinary treatment team this AM, patient is not yet medically cleared for transition to next level of care, however discharge plan remains for return home w/ no skilled needs. Inpatient social work & case management department to continue to follow patient's care & progress throughout hospitalization.

## 2023-07-19 NOTE — PROVIDER CONTACT NOTE (CHANGE IN STATUS NOTIFICATION) - ASSESSMENT
Patient vomitted x1 this AM, 200 mL thin liquid brown vomitus, pt endorses low appetite. Abdominal Xray performed, LR initiated @ 100 mL/hr, IV Zosyn in progress, K Riders x3 in progress for K+ 2.9.
patient alert oriented , no change from baseline

## 2023-07-19 NOTE — PROGRESS NOTE ADULT - SUBJECTIVE AND OBJECTIVE BOX
[facility], Division of Infectious Diseases  AJ Chow Y. Patel, S. Shah, G. [facility]  [phone]    Name: ANGEL RAMAN  Age: 48y  Gender: Male  MRN: [id number]    Interval History:  Patient seen and examined at bedside this morning  No acute overnight events.   Notes reviewed    Antibiotics:  piperacillin/tazobactam IVPB.. 3.375 Gram(s) IV Intermittent every 8 hours      Medications:  acetaminophen     Tablet .. 650 milliGRAM(s) Oral every 6 hours PRN  dextrose 5%. 1000 milliLiter(s) IV Continuous <Continuous>  dextrose 5%. 1000 milliLiter(s) IV Continuous <Continuous>  dextrose 50% Injectable 12.5 Gram(s) IV Push once  dextrose 50% Injectable 25 Gram(s) IV Push once  dextrose 50% Injectable 25 Gram(s) IV Push once  dextrose Oral Gel 15 Gram(s) Oral once PRN  glucagon  Injectable 1 milliGRAM(s) IntraMuscular once  glucagon  Injectable 1 milliGRAM(s) IntraMuscular once  insulin lispro (ADMELOG) corrective regimen sliding scale   SubCutaneous at bedtime  insulin lispro (ADMELOG) corrective regimen sliding scale   SubCutaneous three times a day before meals  lactated ringers. 1000 milliLiter(s) IV Continuous <Continuous>  melatonin 3 milliGRAM(s) Oral at bedtime PRN  ondansetron Injectable 4 milliGRAM(s) IV Push every 8 hours PRN  pantoprazole  Injectable 40 milliGRAM(s) IV Push daily  piperacillin/tazobactam IVPB.. 3.375 Gram(s) IV Intermittent every 8 hours  potassium chloride  10 mEq/100 mL IVPB 10 milliEquivalent(s) IV Intermittent every 1 hour  simethicone 80 milliGRAM(s) Chew three times a day      Review of Systems:  A 10-point review of systems was obtained.     Pertinent positives and negatives--  Constitutional: No fevers. No Chills. No Rigors.   Cardiovascular: No chest pain. No palpitations.  Respiratory: No shortness of breath. No cough.  Gastrointestinal: No nausea or vomiting. No diarrhea or constipation.   Psychiatric: Pleasant. Appropriate affect.    Review of systems otherwise negative except as previously noted.    Allergies: No Known Allergies    For details regarding the patient's past medical history, social history, family history, and other miscellaneous elements, please refer the initial infectious diseases consultation and/or the admitting history and physical examination for this admission.    Objective:  Vitals:   T(C): 37.4 (07-19-23 @ 05:22), Max: 39.3 (07-18-23 @ 14:13)  HR: 97 (07-19-23 @ 05:22) (93 - 106)  BP: 118/73 (07-19-23 @ 05:22) (101/62 - 133/81)  RR: 18 (07-19-23 @ 05:22) (15 - 18)  SpO2: 97% (07-19-23 @ 05:22) (96% - 98%)    Physical Examination:  General: no acute distress  HEENT: NC/AT, EOMI, anicteric, no oral lesions  Neck: supple, no palpable LAD  Cardio: S1, S2 heard, RRR, no murmurs  Resp: breath sounds heard bilaterally, no rales, wheezes or rhonchi  Abd: soft, NT, ND, + bowel sounds  Neuro: no obvious focal deficits  Ext: no edema or cyanosis  Skin: warm, dry, no visible rash      Laboratory Studies:  CBC:                       12.7   7.73  )-----------( 229      ( 19 Jul 2023 08:11 )             35.2     CMP: 07-19    130<L>  |  93<L>  |  10  ----------------------------<  118<H>  2.9<LL>   |  25  |  0.59    Ca    7.5<L>      19 Jul 2023 08:11  Phos  2.1     07-19  Mg     2.3     07-19    TPro  3.9<L>  /  Alb  1.4<L>  /  TBili  1.0  /  DBili  x   /  AST  20  /  ALT  20  /  AlkPhos  20<L>  07-19    LIVER FUNCTIONS - ( 19 Jul 2023 08:11 )  Alb: 1.4 g/dL / Pro: 3.9 g/dL / ALK PHOS: 20 U/L / ALT: 20 U/L DA / AST: 20 U/L / GGT: x           Urinalysis Basic - ( 19 Jul 2023 08:11 )    Color: x / Appearance: x / SG: x / pH: x  Gluc: 118 mg/dL / Ketone: x  / Bili: x / Urobili: x   Blood: x / Protein: x / Nitrite: x   Leuk Esterase: x / RBC: x / WBC x   Sq Epi: x / Non Sq Epi: x / Bacteria: x        Microbiology: reviewed    Culture - Blood (collected 07-17-23 @ 18:41)  Source: .Blood Blood-Venous  Preliminary Report (07-19-23 @ 01:02):    No growth at 24 hours    Culture - Blood (collected 07-17-23 @ 18:00)  Source: .Blood Blood-Venous  Preliminary Report (07-19-23 @ 01:02):    No growth at 24 hours    Culture - Urine (collected 07-15-23 @ 15:37)  Source: Clean Catch Clean Catch (Midstream)  Final Report (07-16-23 @ 23:37):    <10,000 CFU/mL Normal Urogenital Swati    Culture - Blood (collected 07-15-23 @ 12:59)  Source: .Blood Blood-Peripheral  Preliminary Report (07-18-23 @ 23:02):    No growth at 72 Hours    Culture - Blood (collected 07-15-23 @ 12:52)  Source: .Blood Blood-Peripheral  Preliminary Report (07-18-23 @ 23:01):    No growth at 72 Hours          Radiology: reviewed

## 2023-07-19 NOTE — PROVIDER CONTACT NOTE (CHANGE IN STATUS NOTIFICATION) - BACKGROUND
Admitted for nausea/vomitting, partial SBO and enteritis, s/p NG tube placement for decompression
admitted for enteritis

## 2023-07-20 LAB
ALBUMIN SERPL ELPH-MCNC: 1.2 G/DL — LOW (ref 3.3–5)
ALP SERPL-CCNC: 20 U/L — LOW (ref 30–120)
ALT FLD-CCNC: 20 U/L DA — SIGNIFICANT CHANGE UP (ref 10–60)
ANION GAP SERPL CALC-SCNC: 5 MMOL/L — SIGNIFICANT CHANGE UP (ref 5–17)
AST SERPL-CCNC: 21 U/L — SIGNIFICANT CHANGE UP (ref 10–40)
BILIRUB SERPL-MCNC: 0.8 MG/DL — SIGNIFICANT CHANGE UP (ref 0.2–1.2)
BUN SERPL-MCNC: 8 MG/DL — SIGNIFICANT CHANGE UP (ref 7–23)
CALCIUM SERPL-MCNC: 7 MG/DL — LOW (ref 8.4–10.5)
CHLORIDE SERPL-SCNC: 96 MMOL/L — SIGNIFICANT CHANGE UP (ref 96–108)
CO2 SERPL-SCNC: 28 MMOL/L — SIGNIFICANT CHANGE UP (ref 22–31)
CREAT SERPL-MCNC: 0.54 MG/DL — SIGNIFICANT CHANGE UP (ref 0.5–1.3)
CULTURE RESULTS: SIGNIFICANT CHANGE UP
CULTURE RESULTS: SIGNIFICANT CHANGE UP
EGFR: 122 ML/MIN/1.73M2 — SIGNIFICANT CHANGE UP
GLUCOSE BLDC GLUCOMTR-MCNC: 142 MG/DL — HIGH (ref 70–99)
GLUCOSE BLDC GLUCOMTR-MCNC: 146 MG/DL — HIGH (ref 70–99)
GLUCOSE BLDC GLUCOMTR-MCNC: 149 MG/DL — HIGH (ref 70–99)
GLUCOSE BLDC GLUCOMTR-MCNC: 168 MG/DL — HIGH (ref 70–99)
GLUCOSE BLDC GLUCOMTR-MCNC: 193 MG/DL — HIGH (ref 70–99)
GLUCOSE BLDC GLUCOMTR-MCNC: 198 MG/DL — HIGH (ref 70–99)
GLUCOSE SERPL-MCNC: 199 MG/DL — HIGH (ref 70–99)
HCT VFR BLD CALC: 34.2 % — LOW (ref 39–50)
HGB BLD-MCNC: 12.4 G/DL — LOW (ref 13–17)
MAGNESIUM SERPL-MCNC: 2.3 MG/DL — SIGNIFICANT CHANGE UP (ref 1.6–2.6)
MCHC RBC-ENTMCNC: 30.2 PG — SIGNIFICANT CHANGE UP (ref 27–34)
MCHC RBC-ENTMCNC: 36.3 GM/DL — HIGH (ref 32–36)
MCV RBC AUTO: 83.4 FL — SIGNIFICANT CHANGE UP (ref 80–100)
NRBC # BLD: 0 /100 WBCS — SIGNIFICANT CHANGE UP (ref 0–0)
PHOSPHATE SERPL-MCNC: 2 MG/DL — LOW (ref 2.5–4.5)
PLATELET # BLD AUTO: 221 K/UL — SIGNIFICANT CHANGE UP (ref 150–400)
POTASSIUM SERPL-MCNC: 2.6 MMOL/L — CRITICAL LOW (ref 3.5–5.3)
POTASSIUM SERPL-SCNC: 2.6 MMOL/L — CRITICAL LOW (ref 3.5–5.3)
PROT SERPL-MCNC: 4.1 G/DL — LOW (ref 6–8.3)
RBC # BLD: 4.1 M/UL — LOW (ref 4.2–5.8)
RBC # FLD: 12.1 % — SIGNIFICANT CHANGE UP (ref 10.3–14.5)
SODIUM SERPL-SCNC: 129 MMOL/L — LOW (ref 135–145)
SPECIMEN SOURCE: SIGNIFICANT CHANGE UP
SPECIMEN SOURCE: SIGNIFICANT CHANGE UP
WBC # BLD: 6.83 K/UL — SIGNIFICANT CHANGE UP (ref 3.8–10.5)
WBC # FLD AUTO: 6.83 K/UL — SIGNIFICANT CHANGE UP (ref 3.8–10.5)

## 2023-07-20 PROCEDURE — 99232 SBSQ HOSP IP/OBS MODERATE 35: CPT

## 2023-07-20 PROCEDURE — 99233 SBSQ HOSP IP/OBS HIGH 50: CPT

## 2023-07-20 PROCEDURE — 74250 X-RAY XM SM INT 1CNTRST STD: CPT | Mod: 26

## 2023-07-20 RX ORDER — POTASSIUM CHLORIDE 20 MEQ
10 PACKET (EA) ORAL
Refills: 0 | Status: COMPLETED | OUTPATIENT
Start: 2023-07-20 | End: 2023-07-20

## 2023-07-20 RX ORDER — DIATRIZOATE MEGLUMINE 180 MG/ML
100 INJECTION, SOLUTION INTRAVESICAL ONCE
Refills: 0 | Status: COMPLETED | OUTPATIENT
Start: 2023-07-20 | End: 2023-07-20

## 2023-07-20 RX ORDER — POTASSIUM PHOSPHATE, MONOBASIC POTASSIUM PHOSPHATE, DIBASIC 236; 224 MG/ML; MG/ML
30 INJECTION, SOLUTION INTRAVENOUS ONCE
Refills: 0 | Status: COMPLETED | OUTPATIENT
Start: 2023-07-20 | End: 2023-07-20

## 2023-07-20 RX ADMIN — Medication 100 MILLIEQUIVALENT(S): at 10:26

## 2023-07-20 RX ADMIN — SIMETHICONE 80 MILLIGRAM(S): 80 TABLET, CHEWABLE ORAL at 21:20

## 2023-07-20 RX ADMIN — Medication 250 MILLIGRAM(S): at 17:29

## 2023-07-20 RX ADMIN — Medication 2: at 08:49

## 2023-07-20 RX ADMIN — PIPERACILLIN AND TAZOBACTAM 25 GRAM(S): 4; .5 INJECTION, POWDER, LYOPHILIZED, FOR SOLUTION INTRAVENOUS at 13:48

## 2023-07-20 RX ADMIN — Medication 650 MILLIGRAM(S): at 06:51

## 2023-07-20 RX ADMIN — POTASSIUM PHOSPHATE, MONOBASIC POTASSIUM PHOSPHATE, DIBASIC 83.33 MILLIMOLE(S): 236; 224 INJECTION, SOLUTION INTRAVENOUS at 18:34

## 2023-07-20 RX ADMIN — SIMETHICONE 80 MILLIGRAM(S): 80 TABLET, CHEWABLE ORAL at 05:24

## 2023-07-20 RX ADMIN — DIATRIZOATE MEGLUMINE 100 MILLILITER(S): 180 INJECTION, SOLUTION INTRAVESICAL at 13:42

## 2023-07-20 RX ADMIN — PANTOPRAZOLE SODIUM 40 MILLIGRAM(S): 20 TABLET, DELAYED RELEASE ORAL at 11:31

## 2023-07-20 RX ADMIN — Medication 650 MILLIGRAM(S): at 05:23

## 2023-07-20 RX ADMIN — Medication 100 MILLIEQUIVALENT(S): at 12:40

## 2023-07-20 RX ADMIN — Medication 250 MILLIGRAM(S): at 05:24

## 2023-07-20 RX ADMIN — PIPERACILLIN AND TAZOBACTAM 25 GRAM(S): 4; .5 INJECTION, POWDER, LYOPHILIZED, FOR SOLUTION INTRAVENOUS at 06:50

## 2023-07-20 RX ADMIN — Medication 100 MILLIEQUIVALENT(S): at 11:31

## 2023-07-20 RX ADMIN — PIPERACILLIN AND TAZOBACTAM 25 GRAM(S): 4; .5 INJECTION, POWDER, LYOPHILIZED, FOR SOLUTION INTRAVENOUS at 21:20

## 2023-07-20 NOTE — PROVIDER CONTACT NOTE (CRITICAL VALUE NOTIFICATION) - ACTION/TREATMENT ORDERED:
As above. Continue to monitor
to be admitted treated with fluids and antibiotics
continue bolus and repeat lactate
continue treatment
As above. Continue to monitor

## 2023-07-20 NOTE — PROGRESS NOTE ADULT - SUBJECTIVE AND OBJECTIVE BOX
[facility], Division of Infectious Diseases  AJ Chow Y. Patel, S. Shah, G. [facility]  [phone]    Name: ANGEL RAMAN  Age: 48y  Gender: Male  MRN: [id number]    Interval History:  Patient seen and examined at bedside this morning  Febrile this .4F, however appears much more improved this AM  Notes reviewed    Antibiotics:  piperacillin/tazobactam IVPB.. 3.375 Gram(s) IV Intermittent every 8 hours  vancomycin  IVPB 1000 milliGRAM(s) IV Intermittent every 12 hours      Medications:  acetaminophen     Tablet .. 650 milliGRAM(s) Oral every 6 hours PRN  dextrose 5% + sodium chloride 0.9% with potassium chloride 20 mEq/L 1000 milliLiter(s) IV Continuous <Continuous>  dextrose 5%. 1000 milliLiter(s) IV Continuous <Continuous>  dextrose 5%. 1000 milliLiter(s) IV Continuous <Continuous>  dextrose 50% Injectable 12.5 Gram(s) IV Push once  dextrose 50% Injectable 25 Gram(s) IV Push once  dextrose 50% Injectable 25 Gram(s) IV Push once  dextrose Oral Gel 15 Gram(s) Oral once PRN  diatrizoate meglumine/diatrizoate sodium. 100 milliLiter(s) Oral once  glucagon  Injectable 1 milliGRAM(s) IntraMuscular once  glucagon  Injectable 1 milliGRAM(s) IntraMuscular once  insulin lispro (ADMELOG) corrective regimen sliding scale   SubCutaneous at bedtime  insulin lispro (ADMELOG) corrective regimen sliding scale   SubCutaneous three times a day before meals  insulin lispro (ADMELOG) corrective regimen sliding scale   SubCutaneous every 6 hours  melatonin 3 milliGRAM(s) Oral at bedtime PRN  ondansetron Injectable 4 milliGRAM(s) IV Push every 8 hours PRN  pantoprazole  Injectable 40 milliGRAM(s) IV Push daily  piperacillin/tazobactam IVPB.. 3.375 Gram(s) IV Intermittent every 8 hours  potassium chloride  10 mEq/100 mL IVPB 10 milliEquivalent(s) IV Intermittent every 1 hour  simethicone 80 milliGRAM(s) Chew three times a day  vancomycin  IVPB 1000 milliGRAM(s) IV Intermittent every 12 hours      Review of Systems:  A 10-point review of systems was obtained.   Review of systems otherwise negative except as previously noted.    Allergies: No Known Allergies    For details regarding the patient's past medical history, social history, family history, and other miscellaneous elements, please refer the initial infectious diseases consultation and/or the admitting history and physical examination for this admission.    Objective:  Vitals:   T(C): 36.9 (07-20-23 @ 06:51), Max: 38.6 (07-20-23 @ 05:02)  HR: 95 (07-20-23 @ 05:02) (85 - 95)  BP: 113/73 (07-20-23 @ 05:02) (113/73 - 130/79)  RR: 18 (07-20-23 @ 05:02) (18 - 18)  SpO2: 97% (07-20-23 @ 05:02) (96% - 97%)    Physical Examination:  General: no acute distress  HEENT: NC/AT, EOMI,  Cardio: S1, S2 heard, RRR, no murmurs  Resp: CTA  Abd: soft, slightly distended,  Ext: no edema or cyanosis  Skin: warm, dry, no visible rash      Laboratory Studies:  CBC:                       12.4   6.83  )-----------( 221      ( 20 Jul 2023 08:25 )             34.2     CMP: 07-20    129<L>  |  96  |  8   ----------------------------<  199<H>  2.6<LL>   |  28  |  0.54    Ca    7.0<L>      20 Jul 2023 09:47  Phos  2.0     07-20  Mg     2.3     07-20    TPro  4.1<L>  /  Alb  1.2<L>  /  TBili  0.8  /  DBili  x   /  AST  21  /  ALT  20  /  AlkPhos  20<L>  07-20    LIVER FUNCTIONS - ( 20 Jul 2023 09:47 )  Alb: 1.2 g/dL / Pro: 4.1 g/dL / ALK PHOS: 20 U/L / ALT: 20 U/L DA / AST: 21 U/L / GGT: x           Urinalysis Basic - ( 20 Jul 2023 09:47 )    Color: x / Appearance: x / SG: x / pH: x  Gluc: 199 mg/dL / Ketone: x  / Bili: x / Urobili: x   Blood: x / Protein: x / Nitrite: x   Leuk Esterase: x / RBC: x / WBC x   Sq Epi: x / Non Sq Epi: x / Bacteria: x        Microbiology: reviewed    Culture - Blood (collected 07-17-23 @ 18:41)  Source: .Blood Blood-Venous  Preliminary Report (07-20-23 @ 01:01):    No growth at 48 Hours    Culture - Blood (collected 07-17-23 @ 18:00)  Source: .Blood Blood-Venous  Preliminary Report (07-20-23 @ 01:01):    No growth at 48 Hours    Culture - Urine (collected 07-15-23 @ 15:37)  Source: Clean Catch Clean Catch (Midstream)  Final Report (07-16-23 @ 23:37):    <10,000 CFU/mL Normal Urogenital Swati    Culture - Blood (collected 07-15-23 @ 12:59)  Source: .Blood Blood-Peripheral  Preliminary Report (07-19-23 @ 23:01):    No growth at 4 days    Culture - Blood (collected 07-15-23 @ 12:52)  Source: .Blood Blood-Peripheral  Preliminary Report (07-19-23 @ 23:01):    No growth at 4 days          Radiology: reviewed

## 2023-07-20 NOTE — PROGRESS NOTE ADULT - ASSESSMENT
48M with PMHx of T2DM presenting with hyperglycemia (not DKA), abdominal pain, and nausea. CT A&P showing possible enteritis or partial SBO.    Enteritis vs. partial SBO  Etiology likely gastroparesis from DM, possible viral or bacterial infection and less likely mechanical obstruction.  No history of previous surgery or bowel inflammatory process.   NOP for now   To repeat abd pelvic CT 7/20   Refusing further NGT   Continue IVF hdyration   NPO   ID doing a trial of antibiotic vancomycin and Zosyn for suspected infection       #T2DM  - FS TID AC & insulin sliding scale  - A1c is 8.9 indicating he likely needs dual therapy. Personally I recommend metformin and DPP4, but he will follow up outpatient       48M with PMHx of T2DM presenting with hyperglycemia (not DKA), abdominal pain, and nausea. CT A&P showing possible enteritis or partial SBO.    Enteritis vs. partial SBO  Intermittent fevers, vomiting.   Etiology likely gastroparesis from DM, possible viral or bacterial infection and less likely mechanical obstruction.  No history of previous surgery or bowel inflammatory process.   Repeat Abd XR 7/19 plain film suggest SBO.    NPO for now   To repeat abd pelvic CT 7/20   Refusing further NGT   Continue IVF hydration   NPO   ID doing a trial of antibiotic vancomycin and Zosyn for suspected infection   GI- consider CT enterography   Repeat blood cx     Electrolyte abnormality   Hyponatremia, hypokalemia, hypophosphatemia   Setting of possible SBO vomiting , poor po intake   To replete and trend serum electrolytes     T2DM  - FS TID AC & insulin sliding scale  - A1c is 8.9 indicating he likely needs dual therapy.     OOB . PT

## 2023-07-20 NOTE — PROVIDER CONTACT NOTE (CRITICAL VALUE NOTIFICATION) - RECOMMENDATIONS
LR @ 100 mL/hr initiated  K-Riders 10 mEq x3
K-Riders x3  Dr Saldaña will determine dosage of K-Phos infusion
awaiting ct scan

## 2023-07-20 NOTE — PROVIDER CONTACT NOTE (CRITICAL VALUE NOTIFICATION) - ASSESSMENT
AxO x4. s/p 1 episode vomitus 200 mL of thin brown liquid vomitus this AM. Temp 99.3. On full liquid diet but endorses low appetite.
AxO x4. Febrile to 101.4 this AM, temp trending downward s/p Tylenol. IV Zosyn, standing IV fluids infusing, pt currently NPO. No vomitting this shift thus far. Phos 2.

## 2023-07-20 NOTE — PROGRESS NOTE ADULT - ASSESSMENT
47 yo M with PMH of DM who presented to ED with complaint of elevated glucose and nausea, abdominal pain, fever, and an episode of vomiting. Denies diarrhea.   ED course: CBC showed bandemia increased from 18 to 25. CT showed enteritis and unable to rule out partial SBO. findings d/w Dr. Correa who advised NG tube and admission for further testing.     Enteritis  Partial SBO  - febrile with bandemia, abd pain and imaging and exam suggesting abd process/enteritis  - Bcx and repeat BCx NGTD  - GI PCR negative  Continues to remain febrile daily, but appears improved today    Recommendations:   - c/w zosyn for now  - c/w vancomycin, will likely dc x48H if no MRSA recovered  - serial abdominal exams  - trend temps/WBC  - appreciate surgery recs  - appreciate GI recs  - additional management per primary team    Infectious Diseases will continue to follow. Please call with any questions.   Luli Franz M.D.  [facility] Division of Infectious Diseases [phone]

## 2023-07-20 NOTE — PROGRESS NOTE ADULT - SUBJECTIVE AND OBJECTIVE BOX
CC: Still running intermittent Fevers 101.4  , vomiting .    HPI:  Patient is a 49 yo M with PMH of IDDM who presented to ED yesterday with complaint of elevated glucose and nausea. He reported abdominal pain, fever, and an episode of vomiting. He reports he stopped his insulin 2 weeks ago as he says his sugars were under control and no longer needed it. He had a negative workup for DKA and was given oral potassium and was discharged after feeling better with instructions to resume his insulin and followup with his PCP. He returned today   returned today with reports of fever and worsening abdominal pain. Denies diarrhea. He reports he is a cigar smoker.     ED course: CBC shows bandemia increased from 18 to 25. CT showed enteritis and unable to rule out partial SBO. findings d/w Dr. Correa who advised NG tube and admission for further testing. Patient recieved zosyn and GI Dr. Schafer made aware.        PMH: IDDM  PSH: denies abdominal surgeries  Ax: NKDA  PCP: Denae Hernandez     (15 Jul 2023 18:33)    REVIEW OF SYSTEMS:    Patient denied fever, chills, abdominal pain, nausea, vomiting, cough, shortness of breath, chest pain or palpitations    Vital Signs Last 24 Hrs  T(C): 36.9 (20 Jul 2023 06:51), Max: 38.6 (20 Jul 2023 05:02)  T(F): 98.5 (20 Jul 2023 06:51), Max: 101.4 (20 Jul 2023 05:02)  HR: 95 (20 Jul 2023 05:02) (85 - 95)  BP: 113/73 (20 Jul 2023 05:02) (113/73 - 130/79)  BP(mean): --  RR: 18 (20 Jul 2023 05:02) (18 - 18)  SpO2: 97% (20 Jul 2023 05:02) (96% - 97%)    Parameters below as of 20 Jul 2023 05:02  Patient On (Oxygen Delivery Method): room air    I&O's Summary    19 Jul 2023 07:01  -  20 Jul 2023 07:00  --------------------------------------------------------  IN: 700 mL / OUT: 0 mL / NET: 700 mL    20 Jul 2023 07:01  -  20 Jul 2023 10:51  --------------------------------------------------------  IN: 100 mL / OUT: 0 mL / NET: 100 mL      PHYSICAL EXAM:  GENERAL: NAD, well-groomed  HEENT: PERRL, +EOMI, anicteric, no Stebbins  NECK: Supple, No JVD   CHEST/LUNG: CTA bilaterally; Normal effort  HEART: S1S2 Normal intensity, no murmurs, gallops or rubs noted  ABDOMEN: Soft, BS Normoactive, NT, ND, no HSM noted  EXTREMITIES:  2+ radial and DP pulses noted, no clubbing, cyanosis, or edema noted, FROM x 4  SKIN: No rashes or lesions noted  NEURO: A&Ox3, no focal deficits noted, CN II-XII intact  PSYCH: normal mood and affect; insight/judgement appropriate  LABS:                        12.4   6.83  )-----------( 221      ( 20 Jul 2023 08:25 )             34.2     07-20    129<L>  |  96  |  8   ----------------------------<  199<H>  2.6<LL>   |  28  |  0.54    Ca    7.0<L>      20 Jul 2023 09:47  Phos  2.0     07-20  Mg     2.3     07-20    TPro  4.1<L>  /  Alb  1.2<L>  /  TBili  0.8  /  DBili  x   /  AST  21  /  ALT  20  /  AlkPhos  20<L>  07-20      Urinalysis Basic - ( 20 Jul 2023 09:47 )    Color: x / Appearance: x / SG: x / pH: x  Gluc: 199 mg/dL / Ketone: x  / Bili: x / Urobili: x   Blood: x / Protein: x / Nitrite: x   Leuk Esterase: x / RBC: x / WBC x   Sq Epi: x / Non Sq Epi: x / Bacteria: x      RADIOLOGY & ADDITIONAL TESTS:    MEDICATIONS:  MEDICATIONS  (STANDING):  dextrose 5% + sodium chloride 0.9% with potassium chloride 20 mEq/L 1000 milliLiter(s) (100 mL/Hr) IV Continuous <Continuous>  dextrose 5%. 1000 milliLiter(s) (100 mL/Hr) IV Continuous <Continuous>  dextrose 5%. 1000 milliLiter(s) (50 mL/Hr) IV Continuous <Continuous>  dextrose 50% Injectable 12.5 Gram(s) IV Push once  dextrose 50% Injectable 25 Gram(s) IV Push once  dextrose 50% Injectable 25 Gram(s) IV Push once  diatrizoate meglumine/diatrizoate sodium. 100 milliLiter(s) Oral once  glucagon  Injectable 1 milliGRAM(s) IntraMuscular once  glucagon  Injectable 1 milliGRAM(s) IntraMuscular once  insulin lispro (ADMELOG) corrective regimen sliding scale   SubCutaneous every 6 hours  insulin lispro (ADMELOG) corrective regimen sliding scale   SubCutaneous three times a day before meals  insulin lispro (ADMELOG) corrective regimen sliding scale   SubCutaneous at bedtime  pantoprazole  Injectable 40 milliGRAM(s) IV Push daily  piperacillin/tazobactam IVPB.. 3.375 Gram(s) IV Intermittent every 8 hours  potassium chloride  10 mEq/100 mL IVPB 10 milliEquivalent(s) IV Intermittent every 1 hour  simethicone 80 milliGRAM(s) Chew three times a day  vancomycin  IVPB 1000 milliGRAM(s) IV Intermittent every 12 hours    MEDICATIONS  (PRN):  acetaminophen     Tablet .. 650 milliGRAM(s) Oral every 6 hours PRN Temp greater or equal to 38C (100.4F), Mild Pain (1 - 3), Moderate Pain (4 - 6), Severe Pain (7 - 10)  dextrose Oral Gel 15 Gram(s) Oral once PRN Blood Glucose LESS THAN 70 milliGRAM(s)/deciliter  melatonin 3 milliGRAM(s) Oral at bedtime PRN Insomnia  ondansetron Injectable 4 milliGRAM(s) IV Push every 8 hours PRN Nausea and/or Vomiting

## 2023-07-20 NOTE — CASE MANAGEMENT PROGRESS NOTE - NSCMPROGRESSNOTE_GEN_ALL_CORE
RN/CM noted that pt remains acute as per MD, still febrile, IV ABT continues, NPO today, for CT ABD to follow-up on ABD XR- showing SBO. IVF cont. Surgical MD following case. DC plan pending pt's hospital course. CM remains available and continues to follow case.

## 2023-07-20 NOTE — PROGRESS NOTE ADULT - SUBJECTIVE AND OBJECTIVE BOX
SURGERY Progress Note PA    48y male admitted for enteritis    SUBJECTIVE:   Patient seen at bedside, no overnight events, no complaints noted and pain is controlled at this time.   Patient admits to having flatus but not this mornig, very loose bowel movement.   Patient denies any headaches, chest pain, shortness of breath, nausea, vomiting, fever, chills, weakness, dysuria  Patient A+Ox3 in NAD at time of visit.    OBJECTIVE:   T(C): 36.9 (07-20-23 @ 06:51), Max: 38.6 (07-20-23 @ 05:02)  HR: 95 (07-20-23 @ 05:02) (85 - 95)  BP: 113/73 (07-20-23 @ 05:02) (113/73 - 130/79)  RR: 18 (07-20-23 @ 05:02) (18 - 18)  SpO2: 97% (07-20-23 @ 05:02) (96% - 97%)  CAPILLARY BLOOD GLUCOSE      POCT Blood Glucose.: 193 mg/dL (20 Jul 2023 08:44)  POCT Blood Glucose.: 198 mg/dL (20 Jul 2023 07:34)  POCT Blood Glucose.: 149 mg/dL (20 Jul 2023 05:13)  POCT Blood Glucose.: 148 mg/dL (19 Jul 2023 23:23)  POCT Blood Glucose.: 125 mg/dL (19 Jul 2023 16:46)  POCT Blood Glucose.: 132 mg/dL (19 Jul 2023 12:00)    I&O's Detail    19 Jul 2023 07:01  -  20 Jul 2023 07:00  --------------------------------------------------------  IN:    dextrose 5% + sodium chloride 0.9% w/ Additives: 100 mL    Lactated Ringers: 600 mL  Total IN: 700 mL    OUT:  Total OUT: 0 mL    Total NET: 700 mL      20 Jul 2023 07:01  -  20 Jul 2023 09:48  --------------------------------------------------------  IN:    dextrose 5% + sodium chloride 0.9% w/ Additives: 100 mL  Total IN: 100 mL    OUT:  Total OUT: 0 mL    Total NET: 100 mL    Physical exam:  General: A+O x 3 in NAD  HEENT: PERRLA, EOM intact  Neck: trachea midline  Chest: Clear through auscultation bilaterally, No rales, rhonchi wheezes noted bilaterally  Heart: S1,S1 RRR, no murmurs noted  Abdomen: soft less distended, still some tenderness on palpation mostly , active BS x 4, some guarding noted  Incision: intact, no erythema noted  Extremities: no edema noted, warm,  no calf tenderness     MEDICATIONS  (STANDING):  dextrose 5% + sodium chloride 0.9% with potassium chloride 20 mEq/L 1000 milliLiter(s) (100 mL/Hr) IV Continuous <Continuous>  dextrose 5%. 1000 milliLiter(s) (100 mL/Hr) IV Continuous <Continuous>  dextrose 5%. 1000 milliLiter(s) (50 mL/Hr) IV Continuous <Continuous>  dextrose 50% Injectable 12.5 Gram(s) IV Push once  dextrose 50% Injectable 25 Gram(s) IV Push once  dextrose 50% Injectable 25 Gram(s) IV Push once  glucagon  Injectable 1 milliGRAM(s) IntraMuscular once  glucagon  Injectable 1 milliGRAM(s) IntraMuscular once  insulin lispro (ADMELOG) corrective regimen sliding scale   SubCutaneous at bedtime  insulin lispro (ADMELOG) corrective regimen sliding scale   SubCutaneous every 6 hours  insulin lispro (ADMELOG) corrective regimen sliding scale   SubCutaneous three times a day before meals  pantoprazole  Injectable 40 milliGRAM(s) IV Push daily  piperacillin/tazobactam IVPB.. 3.375 Gram(s) IV Intermittent every 8 hours  simethicone 80 milliGRAM(s) Chew three times a day  vancomycin  IVPB 1000 milliGRAM(s) IV Intermittent every 12 hours    MEDICATIONS  (PRN):  acetaminophen     Tablet .. 650 milliGRAM(s) Oral every 6 hours PRN Temp greater or equal to 38C (100.4F), Mild Pain (1 - 3), Moderate Pain (4 - 6), Severe Pain (7 - 10)  dextrose Oral Gel 15 Gram(s) Oral once PRN Blood Glucose LESS THAN 70 milliGRAM(s)/deciliter  melatonin 3 milliGRAM(s) Oral at bedtime PRN Insomnia  ondansetron Injectable 4 milliGRAM(s) IV Push every 8 hours PRN Nausea and/or Vomiting      LABS:                        12.4   6.83  )-----------( 221      ( 20 Jul 2023 08:25 )             34.2     07-19    130<L>  |  93<L>  |  10  ----------------------------<  118<H>  2.9<LL>   |  25  |  0.59    Ca    7.5<L>      19 Jul 2023 08:11  Phos  2.1     07-19  Mg     2.3     07-19    TPro  3.9<L>  /  Alb  1.4<L>  /  TBili  1.0  /  DBili  x   /  AST  20  /  ALT  20  /  AlkPhos  20<L>  07-19      Urinalysis Basic - ( 19 Jul 2023 08:11 )    Color: x / Appearance: x / SG: x / pH: x  Gluc: 118 mg/dL / Ketone: x  / Bili: x / Urobili: x   Blood: x / Protein: x / Nitrite: x   Leuk Esterase: x / RBC: x / WBC x   Sq Epi: x / Non Sq Epi: x / Bacteria: x        RADIOLOGY & ADDITIONAL STUDIES:    Assessment/Plan  Patient is a 48y old Male who presented with a chief complaint of fever nausea vomiting - > enteritis vs SBO   Continue current care  Diet - npo  GI/DVT prophylaxis  Analgesia prn  OOB/ambulation on the floor   Incentive spirometry/Cough/Deep breathing exercises  Sequentials  Consults  Discussed w/ Dr. Correa - [facility]BS[facility] will order

## 2023-07-21 LAB
ALBUMIN SERPL ELPH-MCNC: 1.1 G/DL — LOW (ref 3.3–5)
ALP SERPL-CCNC: 23 U/L — LOW (ref 30–120)
ALT FLD-CCNC: 21 U/L DA — SIGNIFICANT CHANGE UP (ref 10–60)
ANION GAP SERPL CALC-SCNC: 9 MMOL/L — SIGNIFICANT CHANGE UP (ref 5–17)
AST SERPL-CCNC: 14 U/L — SIGNIFICANT CHANGE UP (ref 10–40)
BILIRUB SERPL-MCNC: 0.7 MG/DL — SIGNIFICANT CHANGE UP (ref 0.2–1.2)
BUN SERPL-MCNC: 5 MG/DL — LOW (ref 7–23)
CALCIUM SERPL-MCNC: 7.2 MG/DL — LOW (ref 8.4–10.5)
CHLORIDE SERPL-SCNC: 99 MMOL/L — SIGNIFICANT CHANGE UP (ref 96–108)
CO2 SERPL-SCNC: 24 MMOL/L — SIGNIFICANT CHANGE UP (ref 22–31)
CREAT SERPL-MCNC: 0.46 MG/DL — LOW (ref 0.5–1.3)
EGFR: 128 ML/MIN/1.73M2 — SIGNIFICANT CHANGE UP
GLUCOSE BLDC GLUCOMTR-MCNC: 145 MG/DL — HIGH (ref 70–99)
GLUCOSE BLDC GLUCOMTR-MCNC: 153 MG/DL — HIGH (ref 70–99)
GLUCOSE BLDC GLUCOMTR-MCNC: 175 MG/DL — HIGH (ref 70–99)
GLUCOSE BLDC GLUCOMTR-MCNC: 177 MG/DL — HIGH (ref 70–99)
GLUCOSE BLDC GLUCOMTR-MCNC: 204 MG/DL — HIGH (ref 70–99)
GLUCOSE SERPL-MCNC: 157 MG/DL — HIGH (ref 70–99)
HCT VFR BLD CALC: 36.8 % — LOW (ref 39–50)
HGB BLD-MCNC: 13.1 G/DL — SIGNIFICANT CHANGE UP (ref 13–17)
MAGNESIUM SERPL-MCNC: 1.8 MG/DL — SIGNIFICANT CHANGE UP (ref 1.6–2.6)
MCHC RBC-ENTMCNC: 29.7 PG — SIGNIFICANT CHANGE UP (ref 27–34)
MCHC RBC-ENTMCNC: 35.6 GM/DL — SIGNIFICANT CHANGE UP (ref 32–36)
MCV RBC AUTO: 83.4 FL — SIGNIFICANT CHANGE UP (ref 80–100)
MRSA PCR RESULT.: SIGNIFICANT CHANGE UP
NRBC # BLD: 0 /100 WBCS — SIGNIFICANT CHANGE UP (ref 0–0)
PHOSPHATE SERPL-MCNC: 1.8 MG/DL — LOW (ref 2.5–4.5)
PLATELET # BLD AUTO: 212 K/UL — SIGNIFICANT CHANGE UP (ref 150–400)
POTASSIUM SERPL-MCNC: 2.8 MMOL/L — CRITICAL LOW (ref 3.5–5.3)
POTASSIUM SERPL-SCNC: 2.8 MMOL/L — CRITICAL LOW (ref 3.5–5.3)
PROT SERPL-MCNC: 4 G/DL — LOW (ref 6–8.3)
RBC # BLD: 4.41 M/UL — SIGNIFICANT CHANGE UP (ref 4.2–5.8)
RBC # FLD: 12.1 % — SIGNIFICANT CHANGE UP (ref 10.3–14.5)
S AUREUS DNA NOSE QL NAA+PROBE: SIGNIFICANT CHANGE UP
SODIUM SERPL-SCNC: 132 MMOL/L — LOW (ref 135–145)
VANCOMYCIN TROUGH SERPL-MCNC: 1.4 UG/ML — LOW (ref 10–20)
WBC # BLD: 7.19 K/UL — SIGNIFICANT CHANGE UP (ref 3.8–10.5)
WBC # FLD AUTO: 7.19 K/UL — SIGNIFICANT CHANGE UP (ref 3.8–10.5)

## 2023-07-21 PROCEDURE — 99233 SBSQ HOSP IP/OBS HIGH 50: CPT

## 2023-07-21 PROCEDURE — 74177 CT ABD & PELVIS W/CONTRAST: CPT | Mod: 26

## 2023-07-21 RX ORDER — POTASSIUM CHLORIDE 20 MEQ
10 PACKET (EA) ORAL
Refills: 0 | Status: DISCONTINUED | OUTPATIENT
Start: 2023-07-21 | End: 2023-07-21

## 2023-07-21 RX ORDER — POTASSIUM CHLORIDE 20 MEQ
10 PACKET (EA) ORAL
Refills: 0 | Status: COMPLETED | OUTPATIENT
Start: 2023-07-21 | End: 2023-07-21

## 2023-07-21 RX ORDER — POTASSIUM CHLORIDE 20 MEQ
40 PACKET (EA) ORAL EVERY 4 HOURS
Refills: 0 | Status: COMPLETED | OUTPATIENT
Start: 2023-07-21 | End: 2023-07-21

## 2023-07-21 RX ORDER — VANCOMYCIN HCL 1 G
1500 VIAL (EA) INTRAVENOUS EVERY 12 HOURS
Refills: 0 | Status: DISCONTINUED | OUTPATIENT
Start: 2023-07-21 | End: 2023-07-21

## 2023-07-21 RX ORDER — MAGNESIUM SULFATE 500 MG/ML
2 VIAL (ML) INJECTION ONCE
Refills: 0 | Status: COMPLETED | OUTPATIENT
Start: 2023-07-21 | End: 2023-07-21

## 2023-07-21 RX ADMIN — PANTOPRAZOLE SODIUM 40 MILLIGRAM(S): 20 TABLET, DELAYED RELEASE ORAL at 13:32

## 2023-07-21 RX ADMIN — Medication 25 GRAM(S): at 10:27

## 2023-07-21 RX ADMIN — Medication 40 MILLIEQUIVALENT(S): at 18:40

## 2023-07-21 RX ADMIN — Medication 40 MILLIEQUIVALENT(S): at 13:32

## 2023-07-21 RX ADMIN — SIMETHICONE 80 MILLIGRAM(S): 80 TABLET, CHEWABLE ORAL at 06:13

## 2023-07-21 RX ADMIN — SIMETHICONE 80 MILLIGRAM(S): 80 TABLET, CHEWABLE ORAL at 21:07

## 2023-07-21 RX ADMIN — Medication 100 MILLIEQUIVALENT(S): at 13:43

## 2023-07-21 RX ADMIN — PIPERACILLIN AND TAZOBACTAM 25 GRAM(S): 4; .5 INJECTION, POWDER, LYOPHILIZED, FOR SOLUTION INTRAVENOUS at 21:07

## 2023-07-21 RX ADMIN — SIMETHICONE 80 MILLIGRAM(S): 80 TABLET, CHEWABLE ORAL at 13:32

## 2023-07-21 RX ADMIN — PIPERACILLIN AND TAZOBACTAM 25 GRAM(S): 4; .5 INJECTION, POWDER, LYOPHILIZED, FOR SOLUTION INTRAVENOUS at 13:32

## 2023-07-21 RX ADMIN — Medication 100 MILLIEQUIVALENT(S): at 10:23

## 2023-07-21 RX ADMIN — Medication 300 MILLIGRAM(S): at 06:55

## 2023-07-21 RX ADMIN — Medication 100 MILLIEQUIVALENT(S): at 11:59

## 2023-07-21 RX ADMIN — PIPERACILLIN AND TAZOBACTAM 25 GRAM(S): 4; .5 INJECTION, POWDER, LYOPHILIZED, FOR SOLUTION INTRAVENOUS at 06:13

## 2023-07-21 NOTE — PROVIDER CONTACT NOTE (OTHER) - BACKGROUND
Admitted for nausea/vomitting, prior SBO
Admitted for enteritis and partial SBO, s/p NGT placement with diet advanced to full liquids
Patient is A,OX4,admitted for gastroenteritis, and parital small bowel obstruction.no change in baseline.potassium this morning was 2.5,received 3 k-rider
admitted for enteritis
admitted for enteritis

## 2023-07-21 NOTE — PROGRESS NOTE ADULT - SUBJECTIVE AND OBJECTIVE BOX
INTERVAL HPI/OVERNIGHT EVENTS:  events noted    MEDICATIONS  (STANDING):  dextrose 5% + sodium chloride 0.9% with potassium chloride 20 mEq/L 1000 milliLiter(s) (100 mL/Hr) IV Continuous <Continuous>  dextrose 5%. 1000 milliLiter(s) (100 mL/Hr) IV Continuous <Continuous>  dextrose 5%. 1000 milliLiter(s) (50 mL/Hr) IV Continuous <Continuous>  dextrose 50% Injectable 12.5 Gram(s) IV Push once  dextrose 50% Injectable 25 Gram(s) IV Push once  dextrose 50% Injectable 25 Gram(s) IV Push once  glucagon  Injectable 1 milliGRAM(s) IntraMuscular once  glucagon  Injectable 1 milliGRAM(s) IntraMuscular once  insulin lispro (ADMELOG) corrective regimen sliding scale   SubCutaneous at bedtime  insulin lispro (ADMELOG) corrective regimen sliding scale   SubCutaneous every 6 hours  insulin lispro (ADMELOG) corrective regimen sliding scale   SubCutaneous three times a day before meals  pantoprazole  Injectable 40 milliGRAM(s) IV Push daily  piperacillin/tazobactam IVPB.. 3.375 Gram(s) IV Intermittent every 8 hours  potassium chloride  10 mEq/100 mL IVPB 10 milliEquivalent(s) IV Intermittent every 1 hour  simethicone 80 milliGRAM(s) Chew three times a day  vancomycin  IVPB 1500 milliGRAM(s) IV Intermittent every 12 hours    MEDICATIONS  (PRN):  acetaminophen     Tablet .. 650 milliGRAM(s) Oral every 6 hours PRN Temp greater or equal to 38C (100.4F), Mild Pain (1 - 3), Moderate Pain (4 - 6), Severe Pain (7 - 10)  dextrose Oral Gel 15 Gram(s) Oral once PRN Blood Glucose LESS THAN 70 milliGRAM(s)/deciliter  melatonin 3 milliGRAM(s) Oral at bedtime PRN Insomnia  ondansetron Injectable 4 milliGRAM(s) IV Push every 8 hours PRN Nausea and/or Vomiting      Allergies    No Known Allergies    Intolerances        Review of Systems:    General:  No wt loss, fevers, chills, night sweats,fatigue,   Eyes:  Good vision, no reported pain  ENT:  No sore throat, pain, runny nose, dysphagia  CV:  No pain, palpitatioins, hypo/hypertension  Resp:  No dyspnea, cough, tachypnea, wheezing  GI:  No pain, No nausea, No vomiting, No diarrhea, No constipatiion, No weight loss, No fever, No pruritis, No rectal bleeding, No tarry stools, No dysphagia,  :  No pain, bleeding, incontinence, nocturia  Muscle:  No pain, weakness  Neuro:  No weakness, tingling, memory problems  Psych:  No fatigue, insomnia, mood problems, depression  Endocrine:  No polyuria, polydypsia, cold/heat intolerance  Heme:  No petechiae, ecchymosis, easy bruisability  Skin:  No rash, tattoos, scars, edema      Vital Signs Last 24 Hrs  T(C): 37.1 (21 Jul 2023 04:41), Max: 37.1 (20 Jul 2023 14:01)  T(F): 98.7 (21 Jul 2023 04:41), Max: 98.8 (20 Jul 2023 14:01)  HR: 92 (21 Jul 2023 04:41) (92 - 93)  BP: 117/74 (21 Jul 2023 04:41) (117/74 - 130/78)  BP(mean): --  RR: 18 (21 Jul 2023 04:41) (18 - 18)  SpO2: 97% (21 Jul 2023 04:41) (96% - 97%)    Parameters below as of 21 Jul 2023 04:41  Patient On (Oxygen Delivery Method): room air        PHYSICAL EXAM:    Constitutional: NAD, well-developed  HEENT: EOMI, throat clear  Neck: No LAD, supple  Respiratory: CTA and P  Cardiovascular: S1 and S2, RRR, no M  Gastrointestinal: BS+, soft, NT/ND, neg HSM,  Extremities: No peripheral edema, neg clubing, cyanosis  Vascular: 2+ peripheral pulses  Neurological: A/O x 3, no focal deficits  Psychiatric: Normal mood, normal affect  Skin: No rashes      LABS:                        13.1   7.19  )-----------( 212      ( 21 Jul 2023 08:18 )             36.8     07-21    132<L>  |  99  |  5<L>  ----------------------------<  157<H>  2.8<LL>   |  24  |  0.46<L>    Ca    7.2<L>      21 Jul 2023 08:18  Phos  2.0     07-20  Mg     1.8     07-21    TPro  4.0<L>  /  Alb  1.1<L>  /  TBili  0.7  /  DBili  x   /  AST  14  /  ALT  21  /  AlkPhos  23<L>  07-21      Urinalysis Basic - ( 21 Jul 2023 08:18 )    Color: x / Appearance: x / SG: x / pH: x  Gluc: 157 mg/dL / Ketone: x  / Bili: x / Urobili: x   Blood: x / Protein: x / Nitrite: x   Leuk Esterase: x / RBC: x / WBC x   Sq Epi: x / Non Sq Epi: x / Bacteria: x        RADIOLOGY & ADDITIONAL TESTS:

## 2023-07-21 NOTE — CASE MANAGEMENT PROGRESS NOTE - NSCMPROGRESSNOTE_GEN_ALL_CORE
RN/CM noted that as per MD, pt remains acute, on IV ABT, still unable to tolerate PO. for rpt CT ABD today to follow-up on ABD XR- SBO. DC plan pending pt's hospital course. Pt noted to be ambulating independently on unit. CM remains available.

## 2023-07-21 NOTE — PROGRESS NOTE ADULT - SUBJECTIVE AND OBJECTIVE BOX
SURGERY Progress Note PA    48y admitted for enteritis possible sbo    SUBJECTIVE:   Patient seen at bedside, no overnight events, no complaints noted and pain is controlled at this time.   Patient states has not "farted", no bowel movement this AM.   Patient denies any headaches, chest pain, shortness of breath, nausea, vomiting, fever, chills, weakness, dysuria  Patient A+Ox3 in NAD at time of visit.    OBJECTIVE:   T(C): 37.1 (07-21-23 @ 04:41), Max: 37.1 (07-20-23 @ 14:01)  HR: 92 (07-21-23 @ 04:41) (92 - 93)  BP: 117/74 (07-21-23 @ 04:41) (117/74 - 130/78)  RR: 18 (07-21-23 @ 04:41) (18 - 18)  SpO2: 97% (07-21-23 @ 04:41) (96% - 97%)  CAPILLARY BLOOD GLUCOSE      POCT Blood Glucose.: 204 mg/dL (21 Jul 2023 08:01)  POCT Blood Glucose.: 145 mg/dL (21 Jul 2023 05:26)  POCT Blood Glucose.: 146 mg/dL (20 Jul 2023 23:52)  POCT Blood Glucose.: 168 mg/dL (20 Jul 2023 17:35)  POCT Blood Glucose.: 142 mg/dL (20 Jul 2023 11:58)    I&O's Detail    20 Jul 2023 07:01  -  21 Jul 2023 07:00  --------------------------------------------------------  IN:    dextrose 5% + sodium chloride 0.9% w/ Additives: 900 mL  Total IN: 900 mL    OUT:  Total OUT: 0 mL    Total NET: 900 mL    Physical exam:  General: A+O x 3 in NAD  HEENT: PERRLA, EOM intact  Neck: trachea midline  Chest: Clear through auscultation bilaterally, No rales, rhonchi wheezes noted bilaterally  Heart: S1,S1 RRR, no murmurs noted  Abdomen: soft less distended, less tender, non tympanic, BS x 4 quieter LLQ, no guarding noted  Extremities: no edema noted, warm,  no calf tenderness     MEDICATIONS  (STANDING):  dextrose 5% + sodium chloride 0.9% with potassium chloride 20 mEq/L 1000 milliLiter(s) (100 mL/Hr) IV Continuous <Continuous>  dextrose 5%. 1000 milliLiter(s) (100 mL/Hr) IV Continuous <Continuous>  dextrose 5%. 1000 milliLiter(s) (50 mL/Hr) IV Continuous <Continuous>  dextrose 50% Injectable 12.5 Gram(s) IV Push once  dextrose 50% Injectable 25 Gram(s) IV Push once  dextrose 50% Injectable 25 Gram(s) IV Push once  glucagon  Injectable 1 milliGRAM(s) IntraMuscular once  glucagon  Injectable 1 milliGRAM(s) IntraMuscular once  insulin lispro (ADMELOG) corrective regimen sliding scale   SubCutaneous at bedtime  insulin lispro (ADMELOG) corrective regimen sliding scale   SubCutaneous three times a day before meals  insulin lispro (ADMELOG) corrective regimen sliding scale   SubCutaneous every 6 hours  pantoprazole  Injectable 40 milliGRAM(s) IV Push daily  piperacillin/tazobactam IVPB.. 3.375 Gram(s) IV Intermittent every 8 hours  simethicone 80 milliGRAM(s) Chew three times a day  vancomycin  IVPB 1500 milliGRAM(s) IV Intermittent every 12 hours    MEDICATIONS  (PRN):  acetaminophen     Tablet .. 650 milliGRAM(s) Oral every 6 hours PRN Temp greater or equal to 38C (100.4F), Mild Pain (1 - 3), Moderate Pain (4 - 6), Severe Pain (7 - 10)  dextrose Oral Gel 15 Gram(s) Oral once PRN Blood Glucose LESS THAN 70 milliGRAM(s)/deciliter  melatonin 3 milliGRAM(s) Oral at bedtime PRN Insomnia  ondansetron Injectable 4 milliGRAM(s) IV Push every 8 hours PRN Nausea and/or Vomiting      LABS:                        13.1   7.19  )-----------( 212      ( 21 Jul 2023 08:18 )             36.8     07-20    129<L>  |  96  |  8   ----------------------------<  199<H>  2.6<LL>   |  28  |  0.54    Ca    7.0<L>      20 Jul 2023 09:47  Phos  2.0     07-20  Mg     1.8     07-21    TPro  4.1<L>  /  Alb  1.2<L>  /  TBili  0.8  /  DBili  x   /  AST  21  /  ALT  20  /  AlkPhos  20<L>  07-20      Urinalysis Basic - ( 20 Jul 2023 09:47 )    Color: x / Appearance: x / SG: x / pH: x  Gluc: 199 mg/dL / Ketone: x  / Bili: x / Urobili: x   Blood: x / Protein: x / Nitrite: x   Leuk Esterase: x / RBC: x / WBC x   Sq Epi: x / Non Sq Epi: x / Bacteria: x    RADIOLOGY & ADDITIONAL STUDIES:  Small Bowel Follow Through 7/20 in afternoon    BOWEL: Dilated fluid-filled small bowel loops measuring up to 3.2 cm with   gradual transition in the pelvis suggestive of enteritis. Mild mesenteric   edema in the anterior mid pelvis (3:88).   Early or partial obstruction is not excluded.   Scattered colonic diverticulosis without diverticulitis.   Appendix is normal.    IMPRESSION:  Dilated small bowel with gradual transition in the pelvis suggestive of   enteritis. Recommend follow-up to exclude early or partial small bowel   obstruction. Small ascites.    Trace bilateral pleural effusions.    Assessment  Patient is a 48y old male who presented with a chief complaint of fever, abdominal discomfort     Continue current care  Diet - NPO possible Enterography today  GI/DVT prophylaxis  LABs in AM  Analgesia prn  OOB/ambulation on the floor   Incentive spirometry/Cough/Deep breathing exercises  Sequentials  Consults  SBFT results as above  Awaiting discussion w/ Dr. Correa         SURGERY Progress Note PA    48y admitted for enteritis possible sbo    SUBJECTIVE:   Patient seen at bedside, no overnight events, no complaints noted and pain is controlled at this time.   Patient states has not "farted", no bowel movement this AM.   Patient denies any headaches, chest pain, shortness of breath, nausea, vomiting, fever, chills, weakness, dysuria  Patient A+Ox3 in NAD at time of visit.    OBJECTIVE:   T(C): 37.1 (07-21-23 @ 04:41), Max: 37.1 (07-20-23 @ 14:01)  HR: 92 (07-21-23 @ 04:41) (92 - 93)  BP: 117/74 (07-21-23 @ 04:41) (117/74 - 130/78)  RR: 18 (07-21-23 @ 04:41) (18 - 18)  SpO2: 97% (07-21-23 @ 04:41) (96% - 97%)  CAPILLARY BLOOD GLUCOSE      POCT Blood Glucose.: 204 mg/dL (21 Jul 2023 08:01)  POCT Blood Glucose.: 145 mg/dL (21 Jul 2023 05:26)  POCT Blood Glucose.: 146 mg/dL (20 Jul 2023 23:52)  POCT Blood Glucose.: 168 mg/dL (20 Jul 2023 17:35)  POCT Blood Glucose.: 142 mg/dL (20 Jul 2023 11:58)    I&O's Detail    20 Jul 2023 07:01  -  21 Jul 2023 07:00  --------------------------------------------------------  IN:    dextrose 5% + sodium chloride 0.9% w/ Additives: 900 mL  Total IN: 900 mL    OUT:  Total OUT: 0 mL    Total NET: 900 mL    Physical exam:  General: A+O x 3 in NAD  HEENT: PERRLA, EOM intact  Neck: trachea midline  Chest: Clear through auscultation bilaterally, No rales, rhonchi wheezes noted bilaterally  Heart: S1,S1 RRR, no murmurs noted  Abdomen: soft less distended, less tender, non tympanic, BS x 4 quieter LLQ, no guarding noted  Extremities: no edema noted, warm,  no calf tenderness     MEDICATIONS  (STANDING):  dextrose 5% + sodium chloride 0.9% with potassium chloride 20 mEq/L 1000 milliLiter(s) (100 mL/Hr) IV Continuous <Continuous>  dextrose 5%. 1000 milliLiter(s) (100 mL/Hr) IV Continuous <Continuous>  dextrose 5%. 1000 milliLiter(s) (50 mL/Hr) IV Continuous <Continuous>  dextrose 50% Injectable 12.5 Gram(s) IV Push once  dextrose 50% Injectable 25 Gram(s) IV Push once  dextrose 50% Injectable 25 Gram(s) IV Push once  glucagon  Injectable 1 milliGRAM(s) IntraMuscular once  glucagon  Injectable 1 milliGRAM(s) IntraMuscular once  insulin lispro (ADMELOG) corrective regimen sliding scale   SubCutaneous at bedtime  insulin lispro (ADMELOG) corrective regimen sliding scale   SubCutaneous three times a day before meals  insulin lispro (ADMELOG) corrective regimen sliding scale   SubCutaneous every 6 hours  pantoprazole  Injectable 40 milliGRAM(s) IV Push daily  piperacillin/tazobactam IVPB.. 3.375 Gram(s) IV Intermittent every 8 hours  simethicone 80 milliGRAM(s) Chew three times a day  vancomycin  IVPB 1500 milliGRAM(s) IV Intermittent every 12 hours    MEDICATIONS  (PRN):  acetaminophen     Tablet .. 650 milliGRAM(s) Oral every 6 hours PRN Temp greater or equal to 38C (100.4F), Mild Pain (1 - 3), Moderate Pain (4 - 6), Severe Pain (7 - 10)  dextrose Oral Gel 15 Gram(s) Oral once PRN Blood Glucose LESS THAN 70 milliGRAM(s)/deciliter  melatonin 3 milliGRAM(s) Oral at bedtime PRN Insomnia  ondansetron Injectable 4 milliGRAM(s) IV Push every 8 hours PRN Nausea and/or Vomiting      LABS:                        13.1   7.19  )-----------( 212      ( 21 Jul 2023 08:18 )             36.8     07-20    129<L>  |  96  |  8   ----------------------------<  199<H>  2.6<LL>   |  28  |  0.54    Ca    7.0<L>      20 Jul 2023 09:47  Phos  2.0     07-20  Mg     1.8     07-21    TPro  4.1<L>  /  Alb  1.2<L>  /  TBili  0.8  /  DBili  x   /  AST  21  /  ALT  20  /  AlkPhos  20<L>  07-20      Urinalysis Basic - ( 20 Jul 2023 09:47 )    Color: x / Appearance: x / SG: x / pH: x  Gluc: 199 mg/dL / Ketone: x  / Bili: x / Urobili: x   Blood: x / Protein: x / Nitrite: x   Leuk Esterase: x / RBC: x / WBC x   Sq Epi: x / Non Sq Epi: x / Bacteria: x    RADIOLOGY & ADDITIONAL STUDIES:  Small Bowel Follow Through 7/20 in afternoon    BOWEL: Dilated fluid-filled small bowel loops measuring up to 3.2 cm with   gradual transition in the pelvis suggestive of enteritis. Mild mesenteric   edema in the anterior mid pelvis (3:88).   Early or partial obstruction is not excluded.   Scattered colonic diverticulosis without diverticulitis.   Appendix is normal.    IMPRESSION:  Dilated small bowel with gradual transition in the pelvis suggestive of   enteritis. Recommend follow-up to exclude early or partial small bowel   obstruction. Small ascites.    Trace bilateral pleural effusions.    Assessment  Patient is a 48y old male who presented with a chief complaint of fever, abdominal discomfort enteritis vs partial sbo    Continue current care  Diet - NPO CT Enterography today  GI/DVT prophylaxis  LABs in AM  Phosphorus at noon today and in AM tomorrow  Magnesium tomorrow in AM  K+ riders this morning followed by stat K+ after riders complete  CT enterography with IV and PO contrast this morning  Analgesia prn  OOB/ambulation on the floor   Incentive spirometry/Cough/Deep breathing exercises  Sequentials  Consults  CT SBFT results as above 1530p last view on 7/20  Discussed w/ Dr. Correa         SURGERY Progress Note PA    48y admitted for enteritis possible sbo    SUBJECTIVE:   Patient seen at bedside, no overnight events, no complaints noted and pain is controlled at this time.   Patient states has not "farted", no bowel movement this AM.   Patient denies any headaches, chest pain, shortness of breath, nausea, vomiting, fever, chills, weakness, dysuria  Patient A+Ox3 in NAD at time of visit.    OBJECTIVE:   T(C): 37.1 (07-21-23 @ 04:41), Max: 37.1 (07-20-23 @ 14:01)  HR: 92 (07-21-23 @ 04:41) (92 - 93)  BP: 117/74 (07-21-23 @ 04:41) (117/74 - 130/78)  RR: 18 (07-21-23 @ 04:41) (18 - 18)  SpO2: 97% (07-21-23 @ 04:41) (96% - 97%)  CAPILLARY BLOOD GLUCOSE      POCT Blood Glucose.: 204 mg/dL (21 Jul 2023 08:01)  POCT Blood Glucose.: 145 mg/dL (21 Jul 2023 05:26)  POCT Blood Glucose.: 146 mg/dL (20 Jul 2023 23:52)  POCT Blood Glucose.: 168 mg/dL (20 Jul 2023 17:35)  POCT Blood Glucose.: 142 mg/dL (20 Jul 2023 11:58)    I&O's Detail    20 Jul 2023 07:01  -  21 Jul 2023 07:00  --------------------------------------------------------  IN:    dextrose 5% + sodium chloride 0.9% w/ Additives: 900 mL  Total IN: 900 mL    OUT:  Total OUT: 0 mL    Total NET: 900 mL    Physical exam:  General: A+O x 3 in NAD  HEENT: PERRLA, EOM intact  Neck: trachea midline  Chest: Clear through auscultation bilaterally, No rales, rhonchi wheezes noted bilaterally  Heart: S1,S1 RRR, no murmurs noted  Abdomen: soft less distended, less tender, non tympanic, BS x 4 quieter LLQ, no guarding noted  Extremities: no edema noted, warm,  no calf tenderness     MEDICATIONS  (STANDING):  dextrose 5% + sodium chloride 0.9% with potassium chloride 20 mEq/L 1000 milliLiter(s) (100 mL/Hr) IV Continuous <Continuous>  dextrose 5%. 1000 milliLiter(s) (100 mL/Hr) IV Continuous <Continuous>  dextrose 5%. 1000 milliLiter(s) (50 mL/Hr) IV Continuous <Continuous>  dextrose 50% Injectable 12.5 Gram(s) IV Push once  dextrose 50% Injectable 25 Gram(s) IV Push once  dextrose 50% Injectable 25 Gram(s) IV Push once  glucagon  Injectable 1 milliGRAM(s) IntraMuscular once  glucagon  Injectable 1 milliGRAM(s) IntraMuscular once  insulin lispro (ADMELOG) corrective regimen sliding scale   SubCutaneous at bedtime  insulin lispro (ADMELOG) corrective regimen sliding scale   SubCutaneous three times a day before meals  insulin lispro (ADMELOG) corrective regimen sliding scale   SubCutaneous every 6 hours  pantoprazole  Injectable 40 milliGRAM(s) IV Push daily  piperacillin/tazobactam IVPB.. 3.375 Gram(s) IV Intermittent every 8 hours  simethicone 80 milliGRAM(s) Chew three times a day  vancomycin  IVPB 1500 milliGRAM(s) IV Intermittent every 12 hours    MEDICATIONS  (PRN):  acetaminophen     Tablet .. 650 milliGRAM(s) Oral every 6 hours PRN Temp greater or equal to 38C (100.4F), Mild Pain (1 - 3), Moderate Pain (4 - 6), Severe Pain (7 - 10)  dextrose Oral Gel 15 Gram(s) Oral once PRN Blood Glucose LESS THAN 70 milliGRAM(s)/deciliter  melatonin 3 milliGRAM(s) Oral at bedtime PRN Insomnia  ondansetron Injectable 4 milliGRAM(s) IV Push every 8 hours PRN Nausea and/or Vomiting      LABS:                        13.1   7.19  )-----------( 212      ( 21 Jul 2023 08:18 )             36.8     07-20    129<L>  |  96  |  8   ----------------------------<  199<H>  2.6<LL>   |  28  |  0.54    Ca    7.0<L>      20 Jul 2023 09:47  Phos  2.0     07-20  Mg     1.8     07-21    TPro  4.1<L>  /  Alb  1.2<L>  /  TBili  0.8  /  DBili  x   /  AST  21  /  ALT  20  /  AlkPhos  20<L>  07-20      Urinalysis Basic - ( 20 Jul 2023 09:47 )    Color: x / Appearance: x / SG: x / pH: x  Gluc: 199 mg/dL / Ketone: x  / Bili: x / Urobili: x   Blood: x / Protein: x / Nitrite: x   Leuk Esterase: x / RBC: x / WBC x   Sq Epi: x / Non Sq Epi: x / Bacteria: x    RADIOLOGY & ADDITIONAL STUDIES:  Small Bowel Follow Through 7/20 in afternoon  < from: Xray Small Bowel Series (07.20.23 @ 15:29) >    ACC: [id number] EXAM:  XR SM INTEST SNGL CON STDY   ORDERED BY: KRISSY SUTTON     PROCEDURE DATE:  07/20/2023      INTERPRETATION:  Portable limited upper GI series performed on 7/20/2023.   Comparison with abdominal x-ray from 2023 was available.    Clinical statement: Small bowel obstruction.    Gastrografin was administered and serial x-rays were obtained.    At the 40 minute rico, contrast remained within the upper stomach. A 3   hour film was then obtained Gastrografin is seen throughout the small   bowel and within the ascending colon. The proximal small bowel is   dilated. There are collapsed loops distally. Although limited in   evaluation, this suggests a partial small bowel obstruction.    IMPRESSION:    Findings suggesting a partial small bowel obstruction as above.    --- End of Report ---  TYLER ARELLANO MD; Attending Radiologist  This document has been electronically signed. Jul 20 2023  3:56PM    Assessment  Patient is a 48y old male who presented with a chief complaint of fever, abdominal discomfort enteritis vs partial sbo    Continue current care  Diet - NPO CT Enterography today  GI/DVT prophylaxis  LABs in AM  Phosphorus at noon today and in AM tomorrow  Magnesium tomorrow in AM  K+ riders this morning followed by stat K+ after riders complete  CT enterography with IV and PO contrast this morning  Analgesia prn  OOB/ambulation on the floor   Incentive spirometry/Cough/Deep breathing exercises  Sequentials  Consults  CT SBFT results as above 1530p last view on 7/20  Discussed w/ Dr. Correa

## 2023-07-21 NOTE — PROGRESS NOTE ADULT - SUBJECTIVE AND OBJECTIVE BOX
[facility], Division of Infectious Diseases  AJ Chow Y. Patel, S. Shah, [facility]  [phone]    Name: ANGEL RAMAN  Age: 48y  Gender: Male  MRN: [id number]    Interval History:  Patient seen and examined at bedside  No acute overnight events. Afebrile overnight  feeling much better  pending repeat scan today  Notes reviewed    Antibiotics:  piperacillin/tazobactam IVPB.. 3.375 Gram(s) IV Intermittent every 8 hours      Medications:  acetaminophen     Tablet .. 650 milliGRAM(s) Oral every 6 hours PRN  dextrose 5% + sodium chloride 0.9% with potassium chloride 20 mEq/L 1000 milliLiter(s) IV Continuous <Continuous>  dextrose 5%. 1000 milliLiter(s) IV Continuous <Continuous>  dextrose 5%. 1000 milliLiter(s) IV Continuous <Continuous>  dextrose 50% Injectable 12.5 Gram(s) IV Push once  dextrose 50% Injectable 25 Gram(s) IV Push once  dextrose 50% Injectable 25 Gram(s) IV Push once  dextrose Oral Gel 15 Gram(s) Oral once PRN  glucagon  Injectable 1 milliGRAM(s) IntraMuscular once  glucagon  Injectable 1 milliGRAM(s) IntraMuscular once  insulin lispro (ADMELOG) corrective regimen sliding scale   SubCutaneous at bedtime  insulin lispro (ADMELOG) corrective regimen sliding scale   SubCutaneous every 6 hours  insulin lispro (ADMELOG) corrective regimen sliding scale   SubCutaneous three times a day before meals  melatonin 3 milliGRAM(s) Oral at bedtime PRN  ondansetron Injectable 4 milliGRAM(s) IV Push every 8 hours PRN  pantoprazole  Injectable 40 milliGRAM(s) IV Push daily  piperacillin/tazobactam IVPB.. 3.375 Gram(s) IV Intermittent every 8 hours  potassium chloride    Tablet ER 40 milliEquivalent(s) Oral every 4 hours  potassium chloride  10 mEq/100 mL IVPB 10 milliEquivalent(s) IV Intermittent every 1 hour  simethicone 80 milliGRAM(s) Chew three times a day      Review of Systems:  A 10-point review of systems was obtained.   Review of systems otherwise negative except as previously noted.    Allergies: No Known Allergies    For details regarding the patient's past medical history, social history, family history, and other miscellaneous elements, please refer the initial infectious diseases consultation and/or the admitting history and physical examination for this admission.    Objective:  Vitals:   T(C): 37.1 (07-21-23 @ 04:41), Max: 37.1 (07-20-23 @ 14:01)  HR: 92 (07-21-23 @ 04:41) (92 - 93)  BP: 117/74 (07-21-23 @ 04:41) (117/74 - 130/78)  RR: 18 (07-21-23 @ 04:41) (18 - 18)  SpO2: 97% (07-21-23 @ 04:41) (96% - 97%)    Physical Examination:  General: no acute distress  HEENT: NC/AT, EOMI  Cardio: RRR  Resp: decreased breath sounds  Abd: slightly distended  Ext: no edema or cyanosis  Skin: warm, dry, no visible rash      Laboratory Studies:  CBC:                       13.1   7.19  )-----------( 212      ( 21 Jul 2023 08:18 )             36.8     CMP: 07-21    132<L>  |  99  |  5<L>  ----------------------------<  157<H>  2.8<LL>   |  24  |  0.46<L>    Ca    7.2<L>      21 Jul 2023 08:18  Phos  1.8     07-21  Mg     1.8     07-21    TPro  4.0<L>  /  Alb  1.1<L>  /  TBili  0.7  /  DBili  x   /  AST  14  /  ALT  21  /  AlkPhos  23<L>  07-21    LIVER FUNCTIONS - ( 21 Jul 2023 08:18 )  Alb: 1.1 g/dL / Pro: 4.0 g/dL / ALK PHOS: 23 U/L / ALT: 21 U/L DA / AST: 14 U/L / GGT: x           Urinalysis Basic - ( 21 Jul 2023 08:18 )    Color: x / Appearance: x / SG: x / pH: x  Gluc: 157 mg/dL / Ketone: x  / Bili: x / Urobili: x   Blood: x / Protein: x / Nitrite: x   Leuk Esterase: x / RBC: x / WBC x   Sq Epi: x / Non Sq Epi: x / Bacteria: x        Microbiology: reviewed    Culture - Blood (collected 07-17-23 @ 18:41)  Source: .Blood Blood-Venous  Preliminary Report (07-21-23 @ 01:01):    No growth at 72 Hours    Culture - Blood (collected 07-17-23 @ 18:00)  Source: .Blood Blood-Venous  Preliminary Report (07-21-23 @ 01:01):    No growth at 72 Hours    Culture - Urine (collected 07-15-23 @ 15:37)  Source: Clean Catch Clean Catch (Midstream)  Final Report (07-16-23 @ 23:37):    <10,000 CFU/mL Normal Urogenital Swati    Culture - Blood (collected 07-15-23 @ 12:59)  Source: .Blood Blood-Peripheral  Final Report (07-20-23 @ 23:00):    No growth at 5 days    Culture - Blood (collected 07-15-23 @ 12:52)  Source: .Blood Blood-Peripheral  Final Report (07-20-23 @ 23:00):    No growth at 5 days          Radiology: reviewed

## 2023-07-21 NOTE — PROVIDER CONTACT NOTE (OTHER) - ASSESSMENT
Alert oriented , not nauseous anymore . OOb ambulating
AxO x4, skin warm to touch. Patient denies abdominal pain at present but endorses nausea at time of vomitting. Patient has not yet eaten breakfast, endorses low appetite. Temp 99.3 F this AM. On IV Zosyn.
patient is not in any distress/disconmfort.maintained patient on IVF.patient refused to do potassium blood work.
Patient alert oriented .
AxO x4. s/p 1 episode vomitus this AM, no further vomitting. Currently NPO, on IV fluids, IV Zosyn with IV vanco 1 GM to be added this evening.

## 2023-07-21 NOTE — PROVIDER CONTACT NOTE (OTHER) - SITUATION
Patient has temp of 101.7
Abdominal Xray result posted in [place]: small bowel obstruction
REFUSED TO LAB WORK FOR POTASSIUM
patient vomited large amount of  green liquid
Patient found to have 200 mL thin brown liquid vomitus per basin, night shift RN previously notified nocturnist

## 2023-07-21 NOTE — PROGRESS NOTE ADULT - SUBJECTIVE AND OBJECTIVE BOX
Patient is a 48y old  Male who presents with a chief complaint of Enteritis (21 Jul 2023 09:10)    INTERVAL HPI/OVERNIGHT EVENTS: No acute events overnight. HD stable. Not passing flatus. Denies abdominal pain.     MEDICATIONS  (STANDING):  dextrose 5% + sodium chloride 0.9% with potassium chloride 20 mEq/L 1000 milliLiter(s) (100 mL/Hr) IV Continuous <Continuous>  dextrose 5%. 1000 milliLiter(s) (100 mL/Hr) IV Continuous <Continuous>  dextrose 5%. 1000 milliLiter(s) (50 mL/Hr) IV Continuous <Continuous>  dextrose 50% Injectable 12.5 Gram(s) IV Push once  dextrose 50% Injectable 25 Gram(s) IV Push once  dextrose 50% Injectable 25 Gram(s) IV Push once  glucagon  Injectable 1 milliGRAM(s) IntraMuscular once  glucagon  Injectable 1 milliGRAM(s) IntraMuscular once  insulin lispro (ADMELOG) corrective regimen sliding scale   SubCutaneous at bedtime  insulin lispro (ADMELOG) corrective regimen sliding scale   SubCutaneous three times a day before meals  insulin lispro (ADMELOG) corrective regimen sliding scale   SubCutaneous every 6 hours  magnesium sulfate  IVPB 2 Gram(s) IV Intermittent once  pantoprazole  Injectable 40 milliGRAM(s) IV Push daily  piperacillin/tazobactam IVPB.. 3.375 Gram(s) IV Intermittent every 8 hours  potassium chloride  10 mEq/100 mL IVPB 10 milliEquivalent(s) IV Intermittent every 1 hour  simethicone 80 milliGRAM(s) Chew three times a day  vancomycin  IVPB 1500 milliGRAM(s) IV Intermittent every 12 hours    MEDICATIONS  (PRN):  acetaminophen     Tablet .. 650 milliGRAM(s) Oral every 6 hours PRN Temp greater or equal to 38C (100.4F), Mild Pain (1 - 3), Moderate Pain (4 - 6), Severe Pain (7 - 10)  dextrose Oral Gel 15 Gram(s) Oral once PRN Blood Glucose LESS THAN 70 milliGRAM(s)/deciliter  melatonin 3 milliGRAM(s) Oral at bedtime PRN Insomnia  ondansetron Injectable 4 milliGRAM(s) IV Push every 8 hours PRN Nausea and/or Vomiting      Allergies    No Known Allergies    Intolerances        REVIEW OF SYSTEMS: all negative with exception of above    Vital Signs Last 24 Hrs  T(C): 37.1 (21 Jul 2023 04:41), Max: 37.1 (20 Jul 2023 14:01)  T(F): 98.7 (21 Jul 2023 04:41), Max: 98.8 (20 Jul 2023 14:01)  HR: 92 (21 Jul 2023 04:41) (92 - 93)  BP: 117/74 (21 Jul 2023 04:41) (117/74 - 130/78)  BP(mean): --  RR: 18 (21 Jul 2023 04:41) (18 - 18)  SpO2: 97% (21 Jul 2023 04:41) (96% - 97%)    Parameters below as of 21 Jul 2023 04:41  Patient On (Oxygen Delivery Method): room air        PHYSICAL EXAM:  GENERAL: NAD, well-groomed  HEART: S1S2 Normal intensity, no murmurs, gallops or rubs noted  ABDOMEN: Soft, BS Normoactive, NT, ND, no HSM noted  EXTREMITIES:  2+ radial and DP pulses noted, no clubbing, cyanosis, or edema noted, FROM x 4  NEURO: A&Ox3, no focal deficits noted, CN II-XII intact  PSYCH: normal mood and affect; insight/judgement appropriate    LABS:                        13.1   7.19  )-----------( 212      ( 21 Jul 2023 08:18 )             36.8     07-21    132<L>  |  99  |  5<L>  ----------------------------<  157<H>  2.8<LL>   |  24  |  0.46<L>    Ca    7.2<L>      21 Jul 2023 08:18  Phos  2.0     07-20  Mg     1.8     07-21    TPro  4.0<L>  /  Alb  1.1<L>  /  TBili  0.7  /  DBili  x   /  AST  14  /  ALT  21  /  AlkPhos  23<L>  07-21      Urinalysis Basic - ( 21 Jul 2023 08:18 )    Color: x / Appearance: x / SG: x / pH: x  Gluc: 157 mg/dL / Ketone: x  / Bili: x / Urobili: x   Blood: x / Protein: x / Nitrite: x   Leuk Esterase: x / RBC: x / WBC x   Sq Epi: x / Non Sq Epi: x / Bacteria: x      CAPILLARY BLOOD GLUCOSE      POCT Blood Glucose.: 204 mg/dL (21 Jul 2023 08:01)  POCT Blood Glucose.: 145 mg/dL (21 Jul 2023 05:26)  POCT Blood Glucose.: 146 mg/dL (20 Jul 2023 23:52)  POCT Blood Glucose.: 168 mg/dL (20 Jul 2023 17:35)  POCT Blood Glucose.: 142 mg/dL (20 Jul 2023 11:58)      RADIOLOGY & ADDITIONAL TESTS:    Imaging Personally Reviewed:  [ ] YES  [ ] NO    Consultant(s) Notes Reviewed:  [ ] YES  [ ] NO    Care Discussed with Consultants/Other Providers [ ] YES  [ ] NO

## 2023-07-21 NOTE — PROGRESS NOTE ADULT - ASSESSMENT
47 yo M with PMH of DM who presented to ED with complaint of elevated glucose and nausea, abdominal pain, fever, and an episode of vomiting. Denies diarrhea.   ED course: CBC showed bandemia increased from 18 to 25. CT showed enteritis and unable to rule out partial SBO. findings d/w Dr. Correa who advised NG tube and admission for further testing.     Enteritis  Partial SBO  - febrile with bandemia, abd pain and imaging and exam suggesting abd process/enteritis  - Bcx and repeat BCx NGTD  - GI PCR negative  7/21 afebrile    Recommendations:   - c/w zosyn for now, x7 day course, last day 7/22  - d/c vancomycin  - serial abdominal exams  - trend temps/WBC  - appreciate surgery recs  - appreciate GI recs  - additional management per primary team    D/w Dr. Alice Sinha covering weekend service  Infectious Diseases will continue to follow. Please call with any questions.   Luli Franz M.D.  [facility] [phone]

## 2023-07-21 NOTE — PROGRESS NOTE ADULT - ASSESSMENT
SBO/Enteritis/Gastroparesis  IVF  CTE today  ppi bid  check asca and anca   IVAB  OOB  surg fu noted

## 2023-07-21 NOTE — PROVIDER CONTACT NOTE (OTHER) - REASON
REFUSED TO LAB WORK FOR POTASSIUM
elevated Temp
s/p 200 mL brown liquid thin vomitus prior to 7 am, noted in basin
Abdominal xray result
vomiting

## 2023-07-21 NOTE — PROGRESS NOTE ADULT - ASSESSMENT
48M with PMHx of T2DM presenting with hyperglycemia (not DKA), abdominal pain, and nausea. CT A&P showing possible enteritis or partial SBO.    Enteritis vs. partial SBO  Intermittent fevers, vomiting.   Etiology likely gastroparesis from DM, possible viral or bacterial infection and less likely mechanical obstruction.  No history of previous surgery or bowel inflammatory process.   Repeat Abd XR 7/19 plain film suggest SBO.    NPO for now   Refusing further NGT   Continue IVF hydration   ID doing a trial of antibiotic vancomycin and Zosyn for suspected infection. Increased vancomycin dosing given suboptimal Vanc trough  - F/u CT enterography with IV and PO contrast  Repeat blood cx     Electrolyte abnormality   Hyponatremia, hypokalemia, hypophosphatemia   Setting of possible SBO vomiting , poor po intake   To replete and trend serum electrolytes   - repleted potassium on 7/21    T2DM  - FS TID AC & insulin sliding scale  - A1c is 8.9 indicating he likely needs dual therapy.     OOB . PT        48M with PMHx of T2DM presenting with hyperglycemia (not DKA), abdominal pain, and nausea. CT A&P showing possible enteritis or partial SBO.    Enteritis vs. partial SBO  Intermittent fevers, vomiting.   Etiology likely gastroparesis from DM, possible viral or bacterial infection and less likely mechanical obstruction.  No history of previous surgery or bowel inflammatory process.   Repeat Abd XR 7/19 plain film suggest SBO.    NPO for now   Refusing further NGT   Continue IVF hydration   - ID doing a trial of antibiotic Zosyn D6/7 for suspected infection. Will d/c Vancomycin  - F/u CT enterography with IV and PO contrast  Repeat blood cx     Electrolyte abnormality   Hyponatremia, hypokalemia, hypophosphatemia   Setting of possible SBO vomiting , poor po intake   To replete and trend serum electrolytes   - repleted potassium on 7/21    T2DM  - FS TID AC & insulin sliding scale  - A1c is 8.9 indicating he likely needs dual therapy.     OOB . PT

## 2023-07-21 NOTE — PROVIDER CONTACT NOTE (OTHER) - RECOMMENDATIONS
,notified,patient refused to do blood work for pot  ,notified,patient refused to do blood work for potassium at this time,as per patient he will do blood work in the morning.

## 2023-07-21 NOTE — PROVIDER CONTACT NOTE (OTHER) - ACTION/TREATMENT ORDERED:
As above. Continue to monitor
will downgrade the diet
As above. Continue to monitor
Continue IV Zosyn. Tylenol as ordered .
no further intervnetion right now.will do blood work in the morning.DR.MEI rodney.

## 2023-07-21 NOTE — CHART NOTE - NSCHARTNOTEFT_GEN_A_CORE
Vancomycin trough came back at 1.4.  Patient was getting only 1000mg BID.  Since his vanco trough is so low, will increase dose to 1500mg BID (CrCl is 186).

## 2023-07-22 LAB
ALBUMIN SERPL ELPH-MCNC: 1.1 G/DL — LOW (ref 3.3–5)
ALP SERPL-CCNC: 24 U/L — LOW (ref 30–120)
ALT FLD-CCNC: 20 U/L DA — SIGNIFICANT CHANGE UP (ref 10–60)
ANION GAP SERPL CALC-SCNC: 6 MMOL/L — SIGNIFICANT CHANGE UP (ref 5–17)
AST SERPL-CCNC: 19 U/L — SIGNIFICANT CHANGE UP (ref 10–40)
BILIRUB SERPL-MCNC: 0.9 MG/DL — SIGNIFICANT CHANGE UP (ref 0.2–1.2)
BUN SERPL-MCNC: 6 MG/DL — LOW (ref 7–23)
CALCIUM SERPL-MCNC: 7.2 MG/DL — LOW (ref 8.4–10.5)
CHLORIDE SERPL-SCNC: 101 MMOL/L — SIGNIFICANT CHANGE UP (ref 96–108)
CO2 SERPL-SCNC: 24 MMOL/L — SIGNIFICANT CHANGE UP (ref 22–31)
CREAT SERPL-MCNC: 0.48 MG/DL — LOW (ref 0.5–1.3)
EGFR: 127 ML/MIN/1.73M2 — SIGNIFICANT CHANGE UP
GLUCOSE BLDC GLUCOMTR-MCNC: 138 MG/DL — HIGH (ref 70–99)
GLUCOSE BLDC GLUCOMTR-MCNC: 150 MG/DL — HIGH (ref 70–99)
GLUCOSE BLDC GLUCOMTR-MCNC: 158 MG/DL — HIGH (ref 70–99)
GLUCOSE BLDC GLUCOMTR-MCNC: 165 MG/DL — HIGH (ref 70–99)
GLUCOSE BLDC GLUCOMTR-MCNC: 188 MG/DL — HIGH (ref 70–99)
GLUCOSE SERPL-MCNC: 175 MG/DL — HIGH (ref 70–99)
HCT VFR BLD CALC: 36.6 % — LOW (ref 39–50)
HGB BLD-MCNC: 12.6 G/DL — LOW (ref 13–17)
MAGNESIUM SERPL-MCNC: 2.1 MG/DL — SIGNIFICANT CHANGE UP (ref 1.6–2.6)
MCHC RBC-ENTMCNC: 29.4 PG — SIGNIFICANT CHANGE UP (ref 27–34)
MCHC RBC-ENTMCNC: 34.4 GM/DL — SIGNIFICANT CHANGE UP (ref 32–36)
MCV RBC AUTO: 85.3 FL — SIGNIFICANT CHANGE UP (ref 80–100)
NRBC # BLD: 0 /100 WBCS — SIGNIFICANT CHANGE UP (ref 0–0)
PHOSPHATE SERPL-MCNC: 1.8 MG/DL — LOW (ref 2.5–4.5)
PLATELET # BLD AUTO: 189 K/UL — SIGNIFICANT CHANGE UP (ref 150–400)
POTASSIUM SERPL-MCNC: 3.7 MMOL/L — SIGNIFICANT CHANGE UP (ref 3.5–5.3)
POTASSIUM SERPL-SCNC: 3.7 MMOL/L — SIGNIFICANT CHANGE UP (ref 3.5–5.3)
PROT SERPL-MCNC: 3.6 G/DL — LOW (ref 6–8.3)
RBC # BLD: 4.29 M/UL — SIGNIFICANT CHANGE UP (ref 4.2–5.8)
RBC # FLD: 12.4 % — SIGNIFICANT CHANGE UP (ref 10.3–14.5)
SODIUM SERPL-SCNC: 131 MMOL/L — LOW (ref 135–145)
WBC # BLD: 8.46 K/UL — SIGNIFICANT CHANGE UP (ref 3.8–10.5)
WBC # FLD AUTO: 8.46 K/UL — SIGNIFICANT CHANGE UP (ref 3.8–10.5)

## 2023-07-22 PROCEDURE — 99233 SBSQ HOSP IP/OBS HIGH 50: CPT

## 2023-07-22 RX ORDER — MESALAMINE 400 MG
1200 TABLET, DELAYED RELEASE (ENTERIC COATED) ORAL THREE TIMES A DAY
Refills: 0 | Status: DISCONTINUED | OUTPATIENT
Start: 2023-07-22 | End: 2023-07-27

## 2023-07-22 RX ORDER — POTASSIUM PHOSPHATE, MONOBASIC POTASSIUM PHOSPHATE, DIBASIC 236; 224 MG/ML; MG/ML
30 INJECTION, SOLUTION INTRAVENOUS ONCE
Refills: 0 | Status: DISCONTINUED | OUTPATIENT
Start: 2023-07-22 | End: 2023-07-22

## 2023-07-22 RX ADMIN — Medication 85 MILLIMOLE(S): at 11:16

## 2023-07-22 RX ADMIN — PANTOPRAZOLE SODIUM 40 MILLIGRAM(S): 20 TABLET, DELAYED RELEASE ORAL at 11:16

## 2023-07-22 RX ADMIN — DEXTROSE MONOHYDRATE, SODIUM CHLORIDE, AND POTASSIUM CHLORIDE 100 MILLILITER(S): 50; .745; 4.5 INJECTION, SOLUTION INTRAVENOUS at 13:46

## 2023-07-22 RX ADMIN — SIMETHICONE 80 MILLIGRAM(S): 80 TABLET, CHEWABLE ORAL at 22:06

## 2023-07-22 RX ADMIN — Medication 1200 MILLIGRAM(S): at 13:39

## 2023-07-22 RX ADMIN — SIMETHICONE 80 MILLIGRAM(S): 80 TABLET, CHEWABLE ORAL at 13:26

## 2023-07-22 RX ADMIN — Medication 2: at 13:26

## 2023-07-22 RX ADMIN — Medication 1200 MILLIGRAM(S): at 22:07

## 2023-07-22 RX ADMIN — DEXTROSE MONOHYDRATE, SODIUM CHLORIDE, AND POTASSIUM CHLORIDE 100 MILLILITER(S): 50; .745; 4.5 INJECTION, SOLUTION INTRAVENOUS at 11:17

## 2023-07-22 RX ADMIN — PIPERACILLIN AND TAZOBACTAM 25 GRAM(S): 4; .5 INJECTION, POWDER, LYOPHILIZED, FOR SOLUTION INTRAVENOUS at 22:06

## 2023-07-22 RX ADMIN — SIMETHICONE 80 MILLIGRAM(S): 80 TABLET, CHEWABLE ORAL at 05:32

## 2023-07-22 RX ADMIN — PIPERACILLIN AND TAZOBACTAM 25 GRAM(S): 4; .5 INJECTION, POWDER, LYOPHILIZED, FOR SOLUTION INTRAVENOUS at 13:26

## 2023-07-22 RX ADMIN — Medication 2: at 17:32

## 2023-07-22 RX ADMIN — PIPERACILLIN AND TAZOBACTAM 25 GRAM(S): 4; .5 INJECTION, POWDER, LYOPHILIZED, FOR SOLUTION INTRAVENOUS at 05:32

## 2023-07-22 NOTE — PROGRESS NOTE ADULT - SUBJECTIVE AND OBJECTIVE BOX
SURGERY DAILY PROGRESS NOTE:     Overnight Events:  Denies nausea/vomiting. Does not think he has been passing flatus  Tolerated contrast for CT yesterday without issue  Denies abdominal pain  Concerned that he has not been eating food for several days, is frustrated that he is not seeing progress    Physical Exam  Vital Signs Last 24 Hrs  T(C): 37.2 (22 Jul 2023 05:00), Max: 37.2 (22 Jul 2023 05:00)  T(F): 99 (22 Jul 2023 05:00), Max: 99 (22 Jul 2023 05:00)  HR: 74 (22 Jul 2023 05:00) (74 - 89)  BP: 110/80 (22 Jul 2023 05:00) (110/80 - 122/73)  BP(mean): --  RR: 18 (22 Jul 2023 05:00) (18 - 18)  SpO2: 98% (22 Jul 2023 05:00) (98% - 98%)    Parameters below as of 22 Jul 2023 05:00  Patient On (Oxygen Delivery Method): room air    Constitutional: No acute distress, resting comfortably  Neuro: Awake and alert  Psych: Calm, affect appropriate  HEENT: Anicteric, no conjunctival injection  Respiratory: Nonlabored, equal chest rise  Cardiovascular: Regular rate and rhythm  GI: Soft, mildly distended, nontender  Musculoskeletal: Warm, no edema, no obvious deformity        Intake and output:  I&O's Detail    21 Jul 2023 07:01  -  22 Jul 2023 07:00  --------------------------------------------------------  IN:    dextrose 5% + sodium chloride 0.9% w/ Additives: 2100 mL    IV PiggyBack: 200 mL  Total IN: 2300 mL    OUT:  Total OUT: 0 mL    Total NET: 2300 mL          Labs:                        12.6   8.46  )-----------( 189      ( 22 Jul 2023 06:39 )             36.6     07-22    131<L>  |  101  |  6<L>  ----------------------------<  175<H>  3.7   |  24  |  0.48<L>    Ca    7.2<L>      22 Jul 2023 06:39  Phos  1.8     07-22  Mg     2.1     07-22    TPro  3.6<L>  /  Alb  1.1<L>  /  TBili  0.9  /  DBili  x   /  AST  19  /  ALT  20  /  AlkPhos  24<L>  07-22      Urinalysis Basic - ( 22 Jul 2023 06:39 )    Color: x / Appearance: x / SG: x / pH: x  Gluc: 175 mg/dL / Ketone: x  / Bili: x / Urobili: x   Blood: x / Protein: x / Nitrite: x   Leuk Esterase: x / RBC: x / WBC x   Sq Epi: x / Non Sq Epi: x / Bacteria: x            Imaging:  < from: CT Enterography w/ Oral Cont and w/ IV Cont (07.21.23 @ 11:39) >    ACC: [id number] EXAM:  CT ENTEROGRAPHY OC IC   ORDERED BY: MAYRA HECTOR     PROCEDURE DATE:  07/21/2023          INTERPRETATION:  CLINICAL INFORMATION:  Patient admitted with abdominal pain, fever with partial small bowel   obstruction possible enteritis.    COMPARISON: CT scan abdomen pelvis 7/15/2023 and small bowel series   7/20/2023.    CONTRAST/COMPLICATIONS:  IV Contrast: Omnipaque 350  90 cc administered   10 cc discarded  Oral Contrast: VoLumen  Complications: None reported at time of studycompletion    PROCEDURE:  CT of the Abdomen and Pelvis (CT Enterography) was performed with   intravenous contrast in the late arterial and portal venous phases.  Sagittal and coronal reformats were performed.    FINDINGS:    LOWER CHEST:  Bilateral pleural effusions with associated compressive atelectasis at   the lung bases.    LIVER: Within normal limits.  BILE DUCTS: Normal caliber.  GALLBLADDER: Within normal limits.  SPLEEN: Within normal limits.  PANCREAS: Subtle peripancreatic stranding distal pancreas, extending   along the left anterior pararenal/retroperitoneal fascial planes.  ADRENALS: Within normal limits.  KIDNEYS/URETERS: Within normal limits.    BLADDER: Within normal limits.  REPRODUCTIVE ORGANS: Mild prominence of the prostate.    BOWEL:  There is a distended fluid-filled stomach.  There is moderately dilated small bowel with gradual transition to normal   caliber small bowel distally.  No abrupt transition is noted.  There is continuous, symmetric, stratified mural hyperenhancement   associated with small bowel wall thickening throughout jejunal and ileal   small bowel loops.  Scattered colonic diverticulosis, without CT evidence of diverticulitis.   Appendix is normal.  PERITONEUM:  Trace abdominal and pelvic ascites.  Mild mesenteric edema.  No extraluminal gas or drainable fluid collections noted.    VESSELS: Within normal limits.    RETROPERITONEUM/LYMPH NODES: No lymphadenopathy.    ABDOMINAL WALL: Within normal limits.    BONES: Within normal limits.    IMPRESSION:    Generalized, continuous mural hyperenhancement/small bowel wall   thickening compatible with nonspecific small bowel inflammation.    Moderate dilated small bowel with gradual transition to normal loops   distally, which may be associated with partial bowel obstruction as   previously noted.    Other findings as discussed above.    --- End of Report ---      BRYANT RODRÍGUEZ MD; Attending Radiologist  This document has been electronically signed. Jul 21 2023 12:46PM    < end of copied text >

## 2023-07-22 NOTE — CHART NOTE - NSCHARTNOTEFT_GEN_A_CORE
Assessment: Per H&P "Patient is a 47 yo M with PMH of IDDM who presented to ED yesterday with complaint of elevated glucose and nausea. He reported abdominal pain, fever, and an episode of vomiting. He reports he stopped his insulin 2 weeks ago as he says his sugars were under control and no longer needed it. He had a negative workup for DKA and was given oral potassium and was discharged after feeling better with instructions to resume his insulin and followup with his PCP. He returned today   returned today with reports of fever and worsening abdominal pain. Denies diarrhea. He reports he is a cigar smoker. "      Pt seen for nutrition follow-up. Previous NPO 7/15-, full liquid -, downgraded to NPO/clear - given po intolerance. Per sx note "The cause of his obstruction is inflammation and the obstruction will improve as the inflammation improves." Last BM , noted emesis 200ml on . Pertinent medications/nutrition labs reviewed; -204 x24hr, hyponatremia, A1c 8.9%, noted on steroid, In house ordered for lispro sliding scale to aid in glucose management. Also receiving D5+ IVF/K+. Will continue to provide Ensure Clear 8oz (180 kcal, 8 g protein) tid. Monitor tolerance, GI function, if unable to increase po intake by day 10, recommend team to consider TPN. RD to continue to monitor nutrition status per protocol.     Factors impacting intake: [ ] none [ ] nausea  [ ] vomiting [ ] diarrhea [ ] constipation  [ ]chewing problems [ ] swallowing issues  [ x] other: NPO/clear    Diet Prescription: Diet, Clear Liquid:   Supplement Feeding Modality:  Oral  Ensure Clear Cans or Servings Per Day:  3       Frequency:  Three Times a day (23 @ 08:28)    Intake: poor    Daily Weight in k (2023 05:22)  admt wt 79.7kg  % Weight Change    Pertinent Medications: MEDICATIONS  (STANDING):  dextrose 5% + sodium chloride 0.9% with potassium chloride 20 mEq/L 1000 milliLiter(s) (100 mL/Hr) IV Continuous <Continuous>  dextrose 5%. 1000 milliLiter(s) (50 mL/Hr) IV Continuous <Continuous>  dextrose 5%. 1000 milliLiter(s) (100 mL/Hr) IV Continuous <Continuous>  dextrose 50% Injectable 25 Gram(s) IV Push once  dextrose 50% Injectable 12.5 Gram(s) IV Push once  dextrose 50% Injectable 25 Gram(s) IV Push once  glucagon  Injectable 1 milliGRAM(s) IntraMuscular once  glucagon  Injectable 1 milliGRAM(s) IntraMuscular once  insulin lispro (ADMELOG) corrective regimen sliding scale   SubCutaneous three times a day before meals  insulin lispro (ADMELOG) corrective regimen sliding scale   SubCutaneous at bedtime  insulin lispro (ADMELOG) corrective regimen sliding scale   SubCutaneous every 6 hours  mesalamine DR Capsule 1200 milliGRAM(s) Oral three times a day  pantoprazole  Injectable 40 milliGRAM(s) IV Push daily  piperacillin/tazobactam IVPB.. 3.375 Gram(s) IV Intermittent every 8 hours  simethicone 80 milliGRAM(s) Chew three times a day    MEDICATIONS  (PRN):  acetaminophen     Tablet .. 650 milliGRAM(s) Oral every 6 hours PRN Temp greater or equal to 38C (100.4F), Mild Pain (1 - 3), Moderate Pain (4 - 6), Severe Pain (7 - 10)  dextrose Oral Gel 15 Gram(s) Oral once PRN Blood Glucose LESS THAN 70 milliGRAM(s)/deciliter  melatonin 3 milliGRAM(s) Oral at bedtime PRN Insomnia  ondansetron Injectable 4 milliGRAM(s) IV Push every 8 hours PRN Nausea and/or Vomiting    Pertinent Labs:  Na131 mmol/L<L> Glu 175 mg/dL<H> K+ 3.7 mmol/L Cr  0.48 mg/dL<L> BUN 6 mg/dL<L>  Phos 1.8 mg/dL<L>  Alb 1.1 g/dL<L>     CAPILLARY BLOOD GLUCOSE      POCT Blood Glucose.: 188 mg/dL (2023 12:29)  POCT Blood Glucose.: 150 mg/dL (2023 06:33)  POCT Blood Glucose.: 158 mg/dL (2023 00:27)  POCT Blood Glucose.: 153 mg/dL (2023 21:22)  POCT Blood Glucose.: 175 mg/dL (2023 18:08)      No edema or pressure injury documented per flowsheets.     Estimated Needs:   [x ] no change since previous assessment  [ ] recalculated:     Previous Nutrition Diagnosis:   [ ] Inadequate Energy Intake [x ]Inadequate Oral Intake [ ] Excessive Energy Intake   [ ] Underweight [ ] Increased Nutrient Needs [ ] Overweight/Obesity [ ] Altered Nutrition related lab values  [ ] Altered GI Function [ ] Unintended Weight Loss [ ] Food & Nutrition Related Knowledge Deficit [ ] Malnutrition     Nutrition Diagnosis is [ x] ongoing  [ ] resolved [ ] not applicable     New Nutrition Diagnosis: [x ] not applicable       Interventions: Will continue to provide Ensure Clear 8oz (180 kcal, 8 g protein) tid. Monitor tolerance, GI function, if unable to increase po intake by day 10, recommend team to consider TPN.   Recommend  [ ] Change Diet To:  [ ] Nutrition Supplement  [ ] Nutrition Support  [ ] Other:     Monitoring and Evaluation:   [X ] Intake [ x ] Tolerance to diet prescription [ x ] weights [ x ] labs[ x ] follow up per protocol  [ ] other:

## 2023-07-22 NOTE — PROGRESS NOTE ADULT - ASSESSMENT
SBO/Enteritis/Gastroparesis  IVF  CTE reviewed  ppi bid  asca and anca pending  IVAB  OOB  surg fu noted  will start mesalamine empirically

## 2023-07-22 NOTE — PROGRESS NOTE ADULT - ASSESSMENT
48M with PMHx of T2DM presenting with hyperglycemia (not DKA), abdominal pain, and nausea. CT A&P showing possible enteritis or partial SBO.    #Enteritis vs. Partial SBO?  - Surgery following and may be diabetic in nature or infectious  - CLD with advancement ad nishant  - CT enterography on 7/21: "Generalized, continuous mural hyperenhancement/small bowel wall thickening compatible with nonspecific small bowel inflammation. Moderate dilated small bowel with gradual transition to normal loops distally, which may be associated with partial bowel obstruction as previously noted."  - Consideration of steroids?  - Finishing a 7 day course of Zosyn on 7/22  - Prior NG tube trial    #Hypokalemia/Hypophosphatemia  - Repleting aggressively    #T2DM  - FS TID AC & insulin sliding scale  - A1c is 8.9 indicating he likely needs dual therapy.  48M with PMHx of T2DM presenting with hyperglycemia (not DKA), abdominal pain, and nausea. CT A&P showing possible enteritis or partial SBO.    #Enteritis vs. Partial SBO?  - Surgery following and may be diabetic in nature or infectious  - CLD with advancement ad nishant  - CT enterography on 7/21: "Generalized, continuous mural hyperenhancement/small bowel wall thickening compatible with nonspecific small bowel inflammation. Moderate dilated small bowel with gradual transition to normal loops distally, which may be associated with partial bowel obstruction as previously noted."  - Consideration of steroids?  - Finishing a 7 day course of Zosyn on 7/22  - Prior NG tube trial  - GI following & ASCA and ANCA sent    #Hypokalemia/Hypophosphatemia  - Repleting aggressively    #T2DM  - FS TID AC & insulin sliding scale  - A1c is 8.9 indicating he likely needs dual therapy.

## 2023-07-22 NOTE — PROGRESS NOTE ADULT - SUBJECTIVE AND OBJECTIVE BOX
Patient is a 48y old  Male who presents with a chief complaint of Abdominal Pain (22 Jul 2023 07:54)      SUBJECTIVE / OVERNIGHT EVENTS: Patient seen and examined at bedside. No acute events overnight. Denies nausea/vomiting or abodminal pain. Had 3 loose bowel movements yesterday. Frustrated by perceived lack of progress. He wants to know what will happen if he doesn't get better.    MEDICATIONS  (STANDING):  dextrose 5% + sodium chloride 0.9% with potassium chloride 20 mEq/L 1000 milliLiter(s) (100 mL/Hr) IV Continuous <Continuous>  dextrose 5%. 1000 milliLiter(s) (50 mL/Hr) IV Continuous <Continuous>  dextrose 5%. 1000 milliLiter(s) (100 mL/Hr) IV Continuous <Continuous>  dextrose 50% Injectable 25 Gram(s) IV Push once  dextrose 50% Injectable 12.5 Gram(s) IV Push once  dextrose 50% Injectable 25 Gram(s) IV Push once  glucagon  Injectable 1 milliGRAM(s) IntraMuscular once  glucagon  Injectable 1 milliGRAM(s) IntraMuscular once  insulin lispro (ADMELOG) corrective regimen sliding scale   SubCutaneous three times a day before meals  insulin lispro (ADMELOG) corrective regimen sliding scale   SubCutaneous at bedtime  insulin lispro (ADMELOG) corrective regimen sliding scale   SubCutaneous every 6 hours  pantoprazole  Injectable 40 milliGRAM(s) IV Push daily  piperacillin/tazobactam IVPB.. 3.375 Gram(s) IV Intermittent every 8 hours  simethicone 80 milliGRAM(s) Chew three times a day  sodium phosphate 30 milliMole(s)/500 mL IVPB 30 milliMole(s) IV Intermittent once    MEDICATIONS  (PRN):  acetaminophen     Tablet .. 650 milliGRAM(s) Oral every 6 hours PRN Temp greater or equal to 38C (100.4F), Mild Pain (1 - 3), Moderate Pain (4 - 6), Severe Pain (7 - 10)  dextrose Oral Gel 15 Gram(s) Oral once PRN Blood Glucose LESS THAN 70 milliGRAM(s)/deciliter  melatonin 3 milliGRAM(s) Oral at bedtime PRN Insomnia  ondansetron Injectable 4 milliGRAM(s) IV Push every 8 hours PRN Nausea and/or Vomiting      CAPILLARY BLOOD GLUCOSE      POCT Blood Glucose.: 150 mg/dL (22 Jul 2023 06:33)  POCT Blood Glucose.: 158 mg/dL (22 Jul 2023 00:27)  POCT Blood Glucose.: 153 mg/dL (21 Jul 2023 21:22)  POCT Blood Glucose.: 175 mg/dL (21 Jul 2023 18:08)  POCT Blood Glucose.: 177 mg/dL (21 Jul 2023 12:00)    I&O's Summary    21 Jul 2023 07:01  -  22 Jul 2023 07:00  --------------------------------------------------------  IN: 2300 mL / OUT: 0 mL / NET: 2300 mL        PHYSICAL EXAM:  Vital Signs Last 24 Hrs  T(C): 37.2 (22 Jul 2023 05:00), Max: 37.2 (22 Jul 2023 05:00)  T(F): 99 (22 Jul 2023 05:00), Max: 99 (22 Jul 2023 05:00)  HR: 74 (22 Jul 2023 05:00) (74 - 89)  BP: 110/80 (22 Jul 2023 05:00) (110/80 - 122/73)  BP(mean): --  RR: 18 (22 Jul 2023 05:00) (18 - 18)  SpO2: 98% (22 Jul 2023 05:00) (98% - 98%)    Parameters below as of 22 Jul 2023 05:00  Patient On (Oxygen Delivery Method): room air        GEN: male in NAD, appears comfortable, no diaphoresis  EYES: No scleral injection, PERRL, EOMI  ENTM: neck supple & symmetric without tracheal deviation, moist membranes, no gross hearing impairment, thyroid gland not enlarged  CV: +S1/S2, no m/r/g, no abdominal bruit, no LE edema  RESP: breathing comfortably, no respiratory accessory muscle use, CTAB, no w/r/r  GI: hypoactive BS, soft, NTND, no rebounding/guarding, no palpable masses    LABS:                        12.6   8.46  )-----------( 189      ( 22 Jul 2023 06:39 )             36.6     07-22    131<L>  |  101  |  6<L>  ----------------------------<  175<H>  3.7   |  24  |  0.48<L>    Ca    7.2<L>      22 Jul 2023 06:39  Phos  1.8     07-22  Mg     2.1     07-22    TPro  3.6<L>  /  Alb  1.1<L>  /  TBili  0.9  /  DBili  x   /  AST  19  /  ALT  20  /  AlkPhos  24<L>  07-22          Urinalysis Basic - ( 22 Jul 2023 06:39 )    Color: x / Appearance: x / SG: x / pH: x  Gluc: 175 mg/dL / Ketone: x  / Bili: x / Urobili: x   Blood: x / Protein: x / Nitrite: x   Leuk Esterase: x / RBC: x / WBC x   Sq Epi: x / Non Sq Epi: x / Bacteria: x        Culture - Blood (collected 20 Jul 2023 16:37)  Source: .Blood Blood-Peripheral  Preliminary Report (22 Jul 2023 01:03):    No growth at 24 hours    Culture - Blood (collected 20 Jul 2023 16:37)  Source: .Blood Blood-Peripheral  Preliminary Report (22 Jul 2023 01:03):    No growth at 24 hours        RADIOLOGY & ADDITIONAL TESTS:  Results Reviewed:   Imaging Personally Reviewed:  Electrocardiogram Personally Reviewed:    COORDINATION OF CARE:  Care Discussed with Consultants/Other Providers [Y/N]:  Prior or Outpatient Records Reviewed [Y/N]:

## 2023-07-22 NOTE — PROGRESS NOTE ADULT - ASSESSMENT
48M admitted with obstruction 2/2 enteritis    CT enterography reviewed - diffuse small bowel inflammation with evidence of partial obstruction    I had a lengthy discussion with the patient who is frustrated, that there is no role for surgery given his pathology. The cause of his obstruction is inflammation and the obstruction will improve as the inflammation improves.    pSBO is likely 2/2 small bowel edema from inflammation seen on CT 2/2 enteritis vs IBD  -GI f/u re: SB inflammation  -consider steroids given diffuse small bowel inflammation that has not improved despite abx   -conservative management of obstruction- which will resolve once small bowel inflammation improves  -will trial on clears - patient educated to stop if he feels abdominal pain or nausea  -monitor GI function  -ambulate 48M admitted with obstruction 2/2 enteritis    CT enterography reviewed - diffuse small bowel inflammation with evidence of partial obstruction    I had a lengthy discussion with the patient who is frustrated, that there is no role for surgery given his pathology. The cause of his obstruction is inflammation and the obstruction will improve as the inflammation improves.    pSBO is likely 2/2 small bowel edema from inflammation seen on CT 2/2 enteritis vs IBD  -GI f/u re: SB inflammation  -consider steroids given diffuse small bowel inflammation that has not improved despite abx   -conservative management of obstruction- which will resolve once small bowel inflammation improves  -will trial on clears - patient educated to stop if he feels abdominal pain or nausea  -monitor GI function  -ambulate  -Again, recommend aggressive electrolyte repletions to maintain K>4, Phos>3, Mg>2 as electrolyte derangements can lead to ileus

## 2023-07-22 NOTE — PROGRESS NOTE ADULT - SUBJECTIVE AND OBJECTIVE BOX
INTERVAL HPI/OVERNIGHT EVENTS:  events noted    MEDICATIONS  (STANDING):  dextrose 5% + sodium chloride 0.9% with potassium chloride 20 mEq/L 1000 milliLiter(s) (100 mL/Hr) IV Continuous <Continuous>  dextrose 5%. 1000 milliLiter(s) (50 mL/Hr) IV Continuous <Continuous>  dextrose 5%. 1000 milliLiter(s) (100 mL/Hr) IV Continuous <Continuous>  dextrose 50% Injectable 25 Gram(s) IV Push once  dextrose 50% Injectable 12.5 Gram(s) IV Push once  dextrose 50% Injectable 25 Gram(s) IV Push once  glucagon  Injectable 1 milliGRAM(s) IntraMuscular once  glucagon  Injectable 1 milliGRAM(s) IntraMuscular once  insulin lispro (ADMELOG) corrective regimen sliding scale   SubCutaneous three times a day before meals  insulin lispro (ADMELOG) corrective regimen sliding scale   SubCutaneous at bedtime  insulin lispro (ADMELOG) corrective regimen sliding scale   SubCutaneous every 6 hours  pantoprazole  Injectable 40 milliGRAM(s) IV Push daily  piperacillin/tazobactam IVPB.. 3.375 Gram(s) IV Intermittent every 8 hours  simethicone 80 milliGRAM(s) Chew three times a day    MEDICATIONS  (PRN):  acetaminophen     Tablet .. 650 milliGRAM(s) Oral every 6 hours PRN Temp greater or equal to 38C (100.4F), Mild Pain (1 - 3), Moderate Pain (4 - 6), Severe Pain (7 - 10)  dextrose Oral Gel 15 Gram(s) Oral once PRN Blood Glucose LESS THAN 70 milliGRAM(s)/deciliter  melatonin 3 milliGRAM(s) Oral at bedtime PRN Insomnia  ondansetron Injectable 4 milliGRAM(s) IV Push every 8 hours PRN Nausea and/or Vomiting      Allergies    No Known Allergies    Intolerances        Review of Systems:    General:  No wt loss, fevers, chills, night sweats,fatigue,   Eyes:  Good vision, no reported pain  ENT:  No sore throat, pain, runny nose, dysphagia  CV:  No pain, palpitatioins, hypo/hypertension  Resp:  No dyspnea, cough, tachypnea, wheezing  GI:  No pain, No nausea, No vomiting, No diarrhea, No constipatiion, No weight loss, No fever, No pruritis, No rectal bleeding, No tarry stools, No dysphagia,  :  No pain, bleeding, incontinence, nocturia  Muscle:  No pain, weakness  Neuro:  No weakness, tingling, memory problems  Psych:  No fatigue, insomnia, mood problems, depression  Endocrine:  No polyuria, polydypsia, cold/heat intolerance  Heme:  No petechiae, ecchymosis, easy bruisability  Skin:  No rash, tattoos, scars, edema      Vital Signs Last 24 Hrs  T(C): 37.2 (22 Jul 2023 05:00), Max: 37.2 (22 Jul 2023 05:00)  T(F): 99 (22 Jul 2023 05:00), Max: 99 (22 Jul 2023 05:00)  HR: 74 (22 Jul 2023 05:00) (74 - 89)  BP: 110/80 (22 Jul 2023 05:00) (110/80 - 122/73)  BP(mean): --  RR: 18 (22 Jul 2023 05:00) (18 - 18)  SpO2: 98% (22 Jul 2023 05:00) (98% - 98%)    Parameters below as of 22 Jul 2023 05:00  Patient On (Oxygen Delivery Method): room air        PHYSICAL EXAM:    Constitutional: NAD, well-developed  HEENT: EOMI, throat clear  Neck: No LAD, supple  Respiratory: CTA and P  Cardiovascular: S1 and S2, RRR, no M  Gastrointestinal: BS+, soft, NT/ND, neg HSM,  Extremities: No peripheral edema, neg clubing, cyanosis  Vascular: 2+ peripheral pulses  Neurological: A/O x 3, no focal deficits  Psychiatric: Normal mood, normal affect  Skin: No rashes      LABS:                        12.6   8.46  )-----------( 189      ( 22 Jul 2023 06:39 )             36.6     07-22    131<L>  |  101  |  6<L>  ----------------------------<  175<H>  3.7   |  24  |  0.48<L>    Ca    7.2<L>      22 Jul 2023 06:39  Phos  1.8     07-22  Mg     2.1     07-22    TPro  3.6<L>  /  Alb  1.1<L>  /  TBili  0.9  /  DBili  x   /  AST  19  /  ALT  20  /  AlkPhos  24<L>  07-22      Urinalysis Basic - ( 22 Jul 2023 06:39 )    Color: x / Appearance: x / SG: x / pH: x  Gluc: 175 mg/dL / Ketone: x  / Bili: x / Urobili: x   Blood: x / Protein: x / Nitrite: x   Leuk Esterase: x / RBC: x / WBC x   Sq Epi: x / Non Sq Epi: x / Bacteria: x        RADIOLOGY & ADDITIONAL TESTS:

## 2023-07-23 LAB
ALBUMIN SERPL ELPH-MCNC: 1.1 G/DL — LOW (ref 3.3–5)
ALP SERPL-CCNC: 20 U/L — LOW (ref 30–120)
ALT FLD-CCNC: 28 U/L DA — SIGNIFICANT CHANGE UP (ref 10–60)
ANION GAP SERPL CALC-SCNC: 9 MMOL/L — SIGNIFICANT CHANGE UP (ref 5–17)
AST SERPL-CCNC: 15 U/L — SIGNIFICANT CHANGE UP (ref 10–40)
AUTO DIFF PNL BLD: NEGATIVE — SIGNIFICANT CHANGE UP
BILIRUB SERPL-MCNC: 1.1 MG/DL — SIGNIFICANT CHANGE UP (ref 0.2–1.2)
BUN SERPL-MCNC: 5 MG/DL — LOW (ref 7–23)
C-ANCA SER-ACNC: NEGATIVE — SIGNIFICANT CHANGE UP
CALCIUM SERPL-MCNC: 7.2 MG/DL — LOW (ref 8.4–10.5)
CHLORIDE SERPL-SCNC: 101 MMOL/L — SIGNIFICANT CHANGE UP (ref 96–108)
CO2 SERPL-SCNC: 24 MMOL/L — SIGNIFICANT CHANGE UP (ref 22–31)
CREAT SERPL-MCNC: 0.47 MG/DL — LOW (ref 0.5–1.3)
CULTURE RESULTS: SIGNIFICANT CHANGE UP
CULTURE RESULTS: SIGNIFICANT CHANGE UP
EGFR: 127 ML/MIN/1.73M2 — SIGNIFICANT CHANGE UP
GLUCOSE BLDC GLUCOMTR-MCNC: 122 MG/DL — HIGH (ref 70–99)
GLUCOSE BLDC GLUCOMTR-MCNC: 139 MG/DL — HIGH (ref 70–99)
GLUCOSE BLDC GLUCOMTR-MCNC: 139 MG/DL — HIGH (ref 70–99)
GLUCOSE BLDC GLUCOMTR-MCNC: 147 MG/DL — HIGH (ref 70–99)
GLUCOSE SERPL-MCNC: 141 MG/DL — HIGH (ref 70–99)
HCT VFR BLD CALC: 37.5 % — LOW (ref 39–50)
HGB BLD-MCNC: 12.9 G/DL — LOW (ref 13–17)
MAGNESIUM SERPL-MCNC: 1.9 MG/DL — SIGNIFICANT CHANGE UP (ref 1.6–2.6)
MCHC RBC-ENTMCNC: 29.4 PG — SIGNIFICANT CHANGE UP (ref 27–34)
MCHC RBC-ENTMCNC: 34.4 GM/DL — SIGNIFICANT CHANGE UP (ref 32–36)
MCV RBC AUTO: 85.4 FL — SIGNIFICANT CHANGE UP (ref 80–100)
NRBC # BLD: 0 /100 WBCS — SIGNIFICANT CHANGE UP (ref 0–0)
P-ANCA SER-ACNC: NEGATIVE — SIGNIFICANT CHANGE UP
PHOSPHATE SERPL-MCNC: 2.6 MG/DL — SIGNIFICANT CHANGE UP (ref 2.5–4.5)
PLATELET # BLD AUTO: 224 K/UL — SIGNIFICANT CHANGE UP (ref 150–400)
POTASSIUM SERPL-MCNC: 3.7 MMOL/L — SIGNIFICANT CHANGE UP (ref 3.5–5.3)
POTASSIUM SERPL-SCNC: 3.7 MMOL/L — SIGNIFICANT CHANGE UP (ref 3.5–5.3)
PROT SERPL-MCNC: 3.8 G/DL — LOW (ref 6–8.3)
RBC # BLD: 4.39 M/UL — SIGNIFICANT CHANGE UP (ref 4.2–5.8)
RBC # FLD: 12.4 % — SIGNIFICANT CHANGE UP (ref 10.3–14.5)
SODIUM SERPL-SCNC: 134 MMOL/L — LOW (ref 135–145)
SPECIMEN SOURCE: SIGNIFICANT CHANGE UP
SPECIMEN SOURCE: SIGNIFICANT CHANGE UP
WBC # BLD: 10.93 K/UL — HIGH (ref 3.8–10.5)
WBC # FLD AUTO: 10.93 K/UL — HIGH (ref 3.8–10.5)

## 2023-07-23 PROCEDURE — 99233 SBSQ HOSP IP/OBS HIGH 50: CPT

## 2023-07-23 RX ADMIN — SIMETHICONE 80 MILLIGRAM(S): 80 TABLET, CHEWABLE ORAL at 14:43

## 2023-07-23 RX ADMIN — Medication 1200 MILLIGRAM(S): at 21:54

## 2023-07-23 RX ADMIN — SIMETHICONE 80 MILLIGRAM(S): 80 TABLET, CHEWABLE ORAL at 06:39

## 2023-07-23 RX ADMIN — SIMETHICONE 80 MILLIGRAM(S): 80 TABLET, CHEWABLE ORAL at 21:54

## 2023-07-23 RX ADMIN — Medication 1200 MILLIGRAM(S): at 06:39

## 2023-07-23 RX ADMIN — DEXTROSE MONOHYDRATE, SODIUM CHLORIDE, AND POTASSIUM CHLORIDE 100 MILLILITER(S): 50; .745; 4.5 INJECTION, SOLUTION INTRAVENOUS at 06:39

## 2023-07-23 RX ADMIN — Medication 1200 MILLIGRAM(S): at 14:41

## 2023-07-23 RX ADMIN — PANTOPRAZOLE SODIUM 40 MILLIGRAM(S): 20 TABLET, DELAYED RELEASE ORAL at 11:59

## 2023-07-23 NOTE — PROGRESS NOTE ADULT - SUBJECTIVE AND OBJECTIVE BOX
Subjective: pt with decreased abdominal pain, tolerating po    Objective:  Vital Signs Last 24 Hrs  T(C): 36.9 (23 Jul 2023 05:07), Max: 37.2 (22 Jul 2023 22:02)  T(F): 98.5 (23 Jul 2023 05:07), Max: 98.9 (22 Jul 2023 22:02)  HR: 76 (23 Jul 2023 05:07) (76 - 97)  BP: 118/76 (23 Jul 2023 05:07) (116/75 - 121/75)  BP(mean): --  RR: 17 (23 Jul 2023 05:07) (17 - 17)  SpO2: 98% (23 Jul 2023 05:07) (97% - 98%)    Parameters below as of 23 Jul 2023 05:07  Patient On (Oxygen Delivery Method): room air        Heent: MARTI RIVERA  Pul: clear  Cor: RSR, without murmurs  Abdomen: normal bowel sounds, without tenderness  Extremities: without edema, motor/sensory intact, without calf pain, Homans sign negative.                        12.9   10.93 )-----------( 224      ( 23 Jul 2023 06:40 )             37.5       07-23    134<L>  |  101  |  5<L>  ----------------------------<  141<H>  3.7   |  24  |  0.47<L>    Ca    7.2<L>      23 Jul 2023 06:40  Phos  2.6     07-23  Mg     1.9     07-23    TPro  3.8<L>  /  Alb  1.1<L>  /  TBili  1.1  /  DBili  x   /  AST  15  /  ALT  28  /  AlkPhos  20<L>  07-23 07-22 @ 07:01  -  07-23 @ 07:00  --------------------------------------------------------  IN:    dextrose 5% + sodium chloride 0.9% w/ Additives: 900 mL  Total IN: 900 mL    OUT:  Total OUT: 0 mL    Total NET: 900 mL

## 2023-07-23 NOTE — PROGRESS NOTE ADULT - ASSESSMENT
48M with PMHx of T2DM presenting with hyperglycemia (not DKA), abdominal pain, and nausea. CT A&P showing possible enteritis or partial SBO.    #Enteritis vs. Partial SBO?  - Surgery following and may be diabetic in nature or infectious  - CT enterography on 7/21: "Generalized, continuous mural hyperenhancement/small bowel wall thickening compatible with nonspecific small bowel inflammation. Moderate dilated small bowel with gradual transition to normal loops distally, which may be associated with partial bowel obstruction as previously noted."  - Finishing a 7 day course of Zosyn on 7/22  - Prior NG tube trial  - GI following & ASCA and ANCA sent  - GI started Mesalamine on 7/22  - Will advance to regular diet on 7/23    #Hypokalemia/Hypophosphatemia  - Repleting aggressively    #T2DM  - FS TID AC & insulin sliding scale  - A1c is 8.9 indicating he likely needs dual therapy    Patient advanced to regular diet. If tolerates will look to discharge with outpatient endocrine and GI follow up

## 2023-07-23 NOTE — PROGRESS NOTE ADULT - SUBJECTIVE AND OBJECTIVE BOX
Patient is a 48y old  Male who presents with a chief complaint of Abdominal Pain (23 Jul 2023 05:23)      SUBJECTIVE / OVERNIGHT EVENTS: Patient seen and examined at bedside. No acute events overnight. No nausea/vomiting. Improved abdominal pain. Less bloating, +BM (more formed). Wants to try regular diet today    MEDICATIONS  (STANDING):  dextrose 5%. 1000 milliLiter(s) (50 mL/Hr) IV Continuous <Continuous>  dextrose 5%. 1000 milliLiter(s) (100 mL/Hr) IV Continuous <Continuous>  dextrose 50% Injectable 25 Gram(s) IV Push once  dextrose 50% Injectable 12.5 Gram(s) IV Push once  dextrose 50% Injectable 25 Gram(s) IV Push once  glucagon  Injectable 1 milliGRAM(s) IntraMuscular once  glucagon  Injectable 1 milliGRAM(s) IntraMuscular once  insulin lispro (ADMELOG) corrective regimen sliding scale   SubCutaneous three times a day before meals  insulin lispro (ADMELOG) corrective regimen sliding scale   SubCutaneous at bedtime  insulin lispro (ADMELOG) corrective regimen sliding scale   SubCutaneous every 6 hours  mesalamine DR Capsule 1200 milliGRAM(s) Oral three times a day  pantoprazole  Injectable 40 milliGRAM(s) IV Push daily  simethicone 80 milliGRAM(s) Chew three times a day    MEDICATIONS  (PRN):  acetaminophen     Tablet .. 650 milliGRAM(s) Oral every 6 hours PRN Temp greater or equal to 38C (100.4F), Mild Pain (1 - 3), Moderate Pain (4 - 6), Severe Pain (7 - 10)  dextrose Oral Gel 15 Gram(s) Oral once PRN Blood Glucose LESS THAN 70 milliGRAM(s)/deciliter  melatonin 3 milliGRAM(s) Oral at bedtime PRN Insomnia  ondansetron Injectable 4 milliGRAM(s) IV Push every 8 hours PRN Nausea and/or Vomiting      CAPILLARY BLOOD GLUCOSE      POCT Blood Glucose.: 139 mg/dL (23 Jul 2023 07:36)  POCT Blood Glucose.: 138 mg/dL (22 Jul 2023 21:05)  POCT Blood Glucose.: 165 mg/dL (22 Jul 2023 16:46)  POCT Blood Glucose.: 188 mg/dL (22 Jul 2023 12:29)    I&O's Summary    22 Jul 2023 07:01  -  23 Jul 2023 07:00  --------------------------------------------------------  IN: 900 mL / OUT: 0 mL / NET: 900 mL        PHYSICAL EXAM:  Vital Signs Last 24 Hrs  T(C): 36.9 (23 Jul 2023 05:07), Max: 37.2 (22 Jul 2023 22:02)  T(F): 98.5 (23 Jul 2023 05:07), Max: 98.9 (22 Jul 2023 22:02)  HR: 76 (23 Jul 2023 05:07) (76 - 97)  BP: 118/76 (23 Jul 2023 05:07) (116/75 - 121/75)  BP(mean): --  RR: 17 (23 Jul 2023 05:07) (17 - 17)  SpO2: 98% (23 Jul 2023 05:07) (97% - 98%)    Parameters below as of 23 Jul 2023 05:07  Patient On (Oxygen Delivery Method): room air        GEN: male in NAD, appears comfortable, no diaphoresis  EYES: No scleral injection, PERRL, EOMI  ENTM: neck supple & symmetric without tracheal deviation, moist membranes, no gross hearing impairment, thyroid gland not enlarged  CV: +S1/S2, no m/r/g, no abdominal bruit, no LE edema  RESP: breathing comfortably, no respiratory accessory muscle use, CTAB, no w/r/r  GI: normoactive BS, soft, NTND, no rebounding/guarding, no palpable masses    LABS:                        12.9   10.93 )-----------( 224      ( 23 Jul 2023 06:40 )             37.5     07-23    134<L>  |  101  |  5<L>  ----------------------------<  141<H>  3.7   |  24  |  0.47<L>    Ca    7.2<L>      23 Jul 2023 06:40  Phos  2.6     07-23  Mg     1.9     07-23    TPro  3.8<L>  /  Alb  1.1<L>  /  TBili  1.1  /  DBili  x   /  AST  15  /  ALT  28  /  AlkPhos  20<L>  07-23          Urinalysis Basic - ( 23 Jul 2023 06:40 )    Color: x / Appearance: x / SG: x / pH: x  Gluc: 141 mg/dL / Ketone: x  / Bili: x / Urobili: x   Blood: x / Protein: x / Nitrite: x   Leuk Esterase: x / RBC: x / WBC x   Sq Epi: x / Non Sq Epi: x / Bacteria: x        Culture - Blood (collected 20 Jul 2023 16:37)  Source: .Blood Blood-Peripheral  Preliminary Report (23 Jul 2023 01:02):    No growth at 48 Hours    Culture - Blood (collected 20 Jul 2023 16:37)  Source: .Blood Blood-Peripheral  Preliminary Report (23 Jul 2023 01:02):    No growth at 48 Hours        RADIOLOGY & ADDITIONAL TESTS:  Results Reviewed:   Imaging Personally Reviewed:  Electrocardiogram Personally Reviewed:    COORDINATION OF CARE:  Care Discussed with Consultants/Other Providers [Y/N]:  Prior or Outpatient Records Reviewed [Y/N]:

## 2023-07-24 LAB
ALBUMIN SERPL ELPH-MCNC: 1.2 G/DL — LOW (ref 3.3–5)
ALP SERPL-CCNC: 39 U/L — SIGNIFICANT CHANGE UP (ref 30–120)
ALT FLD-CCNC: 33 U/L DA — SIGNIFICANT CHANGE UP (ref 10–60)
ANION GAP SERPL CALC-SCNC: 9 MMOL/L — SIGNIFICANT CHANGE UP (ref 5–17)
AST SERPL-CCNC: 20 U/L — SIGNIFICANT CHANGE UP (ref 10–40)
BILIRUB SERPL-MCNC: 0.9 MG/DL — SIGNIFICANT CHANGE UP (ref 0.2–1.2)
BUN SERPL-MCNC: 7 MG/DL — SIGNIFICANT CHANGE UP (ref 7–23)
CALCIUM SERPL-MCNC: 7.5 MG/DL — LOW (ref 8.4–10.5)
CHLORIDE SERPL-SCNC: 96 MMOL/L — SIGNIFICANT CHANGE UP (ref 96–108)
CO2 SERPL-SCNC: 24 MMOL/L — SIGNIFICANT CHANGE UP (ref 22–31)
CREAT SERPL-MCNC: 0.45 MG/DL — LOW (ref 0.5–1.3)
EGFR: 129 ML/MIN/1.73M2 — SIGNIFICANT CHANGE UP
GLUCOSE BLDC GLUCOMTR-MCNC: 115 MG/DL — HIGH (ref 70–99)
GLUCOSE BLDC GLUCOMTR-MCNC: 148 MG/DL — HIGH (ref 70–99)
GLUCOSE BLDC GLUCOMTR-MCNC: 175 MG/DL — HIGH (ref 70–99)
GLUCOSE BLDC GLUCOMTR-MCNC: 192 MG/DL — HIGH (ref 70–99)
GLUCOSE SERPL-MCNC: 123 MG/DL — HIGH (ref 70–99)
HCT VFR BLD CALC: 38 % — LOW (ref 39–50)
HGB BLD-MCNC: 13.3 G/DL — SIGNIFICANT CHANGE UP (ref 13–17)
MAGNESIUM SERPL-MCNC: 1.8 MG/DL — SIGNIFICANT CHANGE UP (ref 1.6–2.6)
MCHC RBC-ENTMCNC: 29.6 PG — SIGNIFICANT CHANGE UP (ref 27–34)
MCHC RBC-ENTMCNC: 35 GM/DL — SIGNIFICANT CHANGE UP (ref 32–36)
MCV RBC AUTO: 84.4 FL — SIGNIFICANT CHANGE UP (ref 80–100)
NRBC # BLD: 0 /100 WBCS — SIGNIFICANT CHANGE UP (ref 0–0)
PHOSPHATE SERPL-MCNC: 2.6 MG/DL — SIGNIFICANT CHANGE UP (ref 2.5–4.5)
PLATELET # BLD AUTO: 278 K/UL — SIGNIFICANT CHANGE UP (ref 150–400)
POTASSIUM SERPL-MCNC: 3.8 MMOL/L — SIGNIFICANT CHANGE UP (ref 3.5–5.3)
POTASSIUM SERPL-SCNC: 3.8 MMOL/L — SIGNIFICANT CHANGE UP (ref 3.5–5.3)
PROT SERPL-MCNC: 4.4 G/DL — LOW (ref 6–8.3)
RBC # BLD: 4.5 M/UL — SIGNIFICANT CHANGE UP (ref 4.2–5.8)
RBC # FLD: 12.3 % — SIGNIFICANT CHANGE UP (ref 10.3–14.5)
SODIUM SERPL-SCNC: 129 MMOL/L — LOW (ref 135–145)
WBC # BLD: 12.6 K/UL — HIGH (ref 3.8–10.5)
WBC # FLD AUTO: 12.6 K/UL — HIGH (ref 3.8–10.5)

## 2023-07-24 PROCEDURE — 99232 SBSQ HOSP IP/OBS MODERATE 35: CPT

## 2023-07-24 RX ORDER — DEXTROSE MONOHYDRATE, SODIUM CHLORIDE, AND POTASSIUM CHLORIDE 50; .745; 4.5 G/1000ML; G/1000ML; G/1000ML
1000 INJECTION, SOLUTION INTRAVENOUS
Refills: 0 | Status: COMPLETED | OUTPATIENT
Start: 2023-07-24 | End: 2023-07-25

## 2023-07-24 RX ORDER — SODIUM CHLORIDE 9 MG/ML
1 INJECTION INTRAMUSCULAR; INTRAVENOUS; SUBCUTANEOUS
Refills: 0 | Status: DISCONTINUED | OUTPATIENT
Start: 2023-07-24 | End: 2023-07-25

## 2023-07-24 RX ADMIN — DEXTROSE MONOHYDRATE, SODIUM CHLORIDE, AND POTASSIUM CHLORIDE 100 MILLILITER(S): 50; .745; 4.5 INJECTION, SOLUTION INTRAVENOUS at 11:44

## 2023-07-24 RX ADMIN — PANTOPRAZOLE SODIUM 40 MILLIGRAM(S): 20 TABLET, DELAYED RELEASE ORAL at 11:42

## 2023-07-24 RX ADMIN — Medication 1200 MILLIGRAM(S): at 21:37

## 2023-07-24 RX ADMIN — Medication 1200 MILLIGRAM(S): at 14:58

## 2023-07-24 RX ADMIN — Medication 1200 MILLIGRAM(S): at 05:24

## 2023-07-24 RX ADMIN — SODIUM CHLORIDE 1 GRAM(S): 9 INJECTION INTRAMUSCULAR; INTRAVENOUS; SUBCUTANEOUS at 21:37

## 2023-07-24 RX ADMIN — SIMETHICONE 80 MILLIGRAM(S): 80 TABLET, CHEWABLE ORAL at 14:59

## 2023-07-24 RX ADMIN — SIMETHICONE 80 MILLIGRAM(S): 80 TABLET, CHEWABLE ORAL at 05:25

## 2023-07-24 RX ADMIN — Medication 2: at 17:19

## 2023-07-24 RX ADMIN — SODIUM CHLORIDE 1 GRAM(S): 9 INJECTION INTRAMUSCULAR; INTRAVENOUS; SUBCUTANEOUS at 15:02

## 2023-07-24 RX ADMIN — SIMETHICONE 80 MILLIGRAM(S): 80 TABLET, CHEWABLE ORAL at 21:38

## 2023-07-24 NOTE — CONSULT NOTE ADULT - CONSULT REASON
enteritis
bandemia
Hyponatremia
enteritis
48y A1C with Estimated Average Glucose Result: 8.9 % (07-16-23 @ 06:21)   diabetes mellitus uncontrolled type 2

## 2023-07-24 NOTE — PROGRESS NOTE ADULT - SUBJECTIVE AND OBJECTIVE BOX
SURGERY    48y    SUBJECTIVE:   Patient seen at bedside, no overnight events, no complaints noted and pain is controlled at this time.   Patient admits to flatus, bowel movement.   Patient denies any headaches, chest pain, shortness of breath, nausea, vomiting, fever, chills, weakness, dysuria  Patient A+Ox3 in NAD at time of visit.    OBJECTIVE:   T(C): 37.2 (07-24-23 @ 05:02), Max: 37.2 (07-24-23 @ 05:02)  HR: 83 (07-24-23 @ 05:02) (83 - 93)  BP: 117/75 (07-24-23 @ 05:02) (117/75 - 121/80)  RR: 18 (07-24-23 @ 05:02) (16 - 18)  SpO2: 98% (07-24-23 @ 05:02) (96% - 98%)  Wt(kg): --  CAPILLARY BLOOD GLUCOSE      POCT Blood Glucose.: 122 mg/dL (23 Jul 2023 21:03)  POCT Blood Glucose.: 139 mg/dL (23 Jul 2023 17:00)  POCT Blood Glucose.: 147 mg/dL (23 Jul 2023 12:30)  POCT Blood Glucose.: 139 mg/dL (23 Jul 2023 07:36)    I&O's Detail      Physical exam:  General: A+O x 3 in NAD  HEENT: PERRLA, EOM intact  Neck: trachea midline  Chest: Clear through auscultation bilaterally, No rales, rhonchi wheezes noted bilaterally  Heart: S1,S1 RRR, no murmurs noted  Abdomen: soft non distended, non tender, non tympanic, BS x 4, no guarding noted  Incision: intact, no erythema noted  Extremities: no edema noted, warm,  no calf tenderness     MEDICATIONS  (STANDING):  dextrose 5%. 1000 milliLiter(s) (50 mL/Hr) IV Continuous <Continuous>  dextrose 5%. 1000 milliLiter(s) (100 mL/Hr) IV Continuous <Continuous>  dextrose 50% Injectable 25 Gram(s) IV Push once  dextrose 50% Injectable 12.5 Gram(s) IV Push once  dextrose 50% Injectable 25 Gram(s) IV Push once  glucagon  Injectable 1 milliGRAM(s) IntraMuscular once  glucagon  Injectable 1 milliGRAM(s) IntraMuscular once  insulin lispro (ADMELOG) corrective regimen sliding scale   SubCutaneous three times a day before meals  insulin lispro (ADMELOG) corrective regimen sliding scale   SubCutaneous at bedtime  insulin lispro (ADMELOG) corrective regimen sliding scale   SubCutaneous every 6 hours  mesalamine DR Capsule 1200 milliGRAM(s) Oral three times a day  pantoprazole  Injectable 40 milliGRAM(s) IV Push daily  simethicone 80 milliGRAM(s) Chew three times a day    MEDICATIONS  (PRN):  acetaminophen     Tablet .. 650 milliGRAM(s) Oral every 6 hours PRN Temp greater or equal to 38C (100.4F), Mild Pain (1 - 3), Moderate Pain (4 - 6), Severe Pain (7 - 10)  dextrose Oral Gel 15 Gram(s) Oral once PRN Blood Glucose LESS THAN 70 milliGRAM(s)/deciliter  melatonin 3 milliGRAM(s) Oral at bedtime PRN Insomnia  ondansetron Injectable 4 milliGRAM(s) IV Push every 8 hours PRN Nausea and/or Vomiting      LABS:                        12.9   10.93 )-----------( 224      ( 23 Jul 2023 06:40 )             37.5     07-23    134<L>  |  101  |  5<L>  ----------------------------<  141<H>  3.7   |  24  |  0.47<L>    Ca    7.2<L>      23 Jul 2023 06:40  Phos  2.6     07-23  Mg     1.9     07-23    TPro  3.8<L>  /  Alb  1.1<L>  /  TBili  1.1  /  DBili  x   /  AST  15  /  ALT  28  /  AlkPhos  20<L>  07-23      Urinalysis Basic - ( 23 Jul 2023 06:40 )    Color: x / Appearance: x / SG: x / pH: x  Gluc: 141 mg/dL / Ketone: x  / Bili: x / Urobili: x   Blood: x / Protein: x / Nitrite: x   Leuk Esterase: x / RBC: x / WBC x   Sq Epi: x / Non Sq Epi: x / Bacteria: x        RADIOLOGY & ADDITIONAL STUDIES:    Assessment  Patient is a 48y old  Male who presents with a chief complaint of abdominal pain (23 Jul 2023 12:40)      Plan      Surgical Team Contact Information     SURGERY    48y admitted 7/15 for enteritis    SUBJECTIVE:   Patient seen at bedside, no overnight events, no complaints noted and pain is controlled at this time 3/10 with palpation, 2/10 at rest.   Patient admits to flatus, bowel movement yesterday 7/23.   Patient denies any headaches, chest pain, shortness of breath, nausea, vomiting, fever, chills, weakness, dysuria  Patient A+Ox3 in NAD at time of visit.    OBJECTIVE:   T(C): 37.2 (07-24-23 @ 05:02), Max: 37.2 (07-24-23 @ 05:02)  HR: 83 (07-24-23 @ 05:02) (83 - 93)  BP: 117/75 (07-24-23 @ 05:02) (117/75 - 121/80)  RR: 18 (07-24-23 @ 05:02) (16 - 18)  SpO2: 98% (07-24-23 @ 05:02) (96% - 98%)  CAPILLARY BLOOD GLUCOSE      POCT Blood Glucose.: 122 mg/dL (23 Jul 2023 21:03)  POCT Blood Glucose.: 139 mg/dL (23 Jul 2023 17:00)  POCT Blood Glucose.: 147 mg/dL (23 Jul 2023 12:30)  POCT Blood Glucose.: 139 mg/dL (23 Jul 2023 07:36)    I&O's Detail      Physical exam:  General: A+O x 3 in NAD  HEENT: PERRLA, EOM intact  Neck: trachea midline  Chest: Clear through auscultation bilaterally, No rales, rhonchi wheezes noted bilaterally  Heart: S1,S1 RRR, no murmurs noted  Abdomen: soft non distended, tender to deep palpation 3/10,  BS x 4, no guarding noted  Extremities: no edema noted, warm,  no calf tenderness     MEDICATIONS  (STANDING):  dextrose 5%. 1000 milliLiter(s) (50 mL/Hr) IV Continuous <Continuous>  dextrose 5%. 1000 milliLiter(s) (100 mL/Hr) IV Continuous <Continuous>  dextrose 50% Injectable 25 Gram(s) IV Push once  dextrose 50% Injectable 12.5 Gram(s) IV Push once  dextrose 50% Injectable 25 Gram(s) IV Push once  glucagon  Injectable 1 milliGRAM(s) IntraMuscular once  glucagon  Injectable 1 milliGRAM(s) IntraMuscular once  insulin lispro (ADMELOG) corrective regimen sliding scale   SubCutaneous three times a day before meals  insulin lispro (ADMELOG) corrective regimen sliding scale   SubCutaneous at bedtime  insulin lispro (ADMELOG) corrective regimen sliding scale   SubCutaneous every 6 hours  mesalamine DR Capsule 1200 milliGRAM(s) Oral three times a day  pantoprazole  Injectable 40 milliGRAM(s) IV Push daily  simethicone 80 milliGRAM(s) Chew three times a day    MEDICATIONS  (PRN):  acetaminophen     Tablet .. 650 milliGRAM(s) Oral every 6 hours PRN Temp greater or equal to 38C (100.4F), Mild Pain (1 - 3), Moderate Pain (4 - 6), Severe Pain (7 - 10)  dextrose Oral Gel 15 Gram(s) Oral once PRN Blood Glucose LESS THAN 70 milliGRAM(s)/deciliter  melatonin 3 milliGRAM(s) Oral at bedtime PRN Insomnia  ondansetron Injectable 4 milliGRAM(s) IV Push every 8 hours PRN Nausea and/or Vomiting      LABS:                        12.9   10.93 )-----------( 224      ( 23 Jul 2023 06:40 )             37.5     07-23    134<L>  |  101  |  5<L>  ----------------------------<  141<H>  3.7   |  24  |  0.47<L>    Ca    7.2<L>      23 Jul 2023 06:40  Phos  2.6     07-23  Mg     1.9     07-23    TPro  3.8<L>  /  Alb  1.1<L>  /  TBili  1.1  /  DBili  x   /  AST  15  /  ALT  28  /  AlkPhos  20<L>  07-23      Urinalysis Basic - ( 23 Jul 2023 06:40 )    Color: x / Appearance: x / SG: x / pH: x  Gluc: 141 mg/dL / Ketone: x  / Bili: x / Urobili: x   Blood: x / Protein: x / Nitrite: x   Leuk Esterase: x / RBC: x / WBC x   Sq Epi: x / Non Sq Epi: x / Bacteria: x    RADIOLOGY & ADDITIONAL STUDIES:    Assessment/Plan  Patient is a 48y old  Male who presents with a chief complaint of abdominal pain (23 Jul 2023 12:40)    Continue current care  Diet -   GI/DVT prophylaxis  Analgesia prn  OOB/ encouraging ambulation on the floor   Incentive spirometry/Cough/Deep breathing exercises  Sequentials  d/c planning to home  Will discuss with Dr. Correa         SURGERY    48y admitted 7/15 for enteritis    SUBJECTIVE:   Patient seen at bedside, no overnight events, no complaints noted and pain is controlled at this time 3/10 with palpation, 2/10 at rest.   Patient admits to flatus, bowel movement yesterday 7/23.   Patient denies any headaches, chest pain, shortness of breath, nausea, vomiting, fever, chills, weakness, dysuria  Patient A+Ox3 in NAD at time of visit.    OBJECTIVE:   T(C): 37.2 (07-24-23 @ 05:02), Max: 37.2 (07-24-23 @ 05:02)  HR: 83 (07-24-23 @ 05:02) (83 - 93)  BP: 117/75 (07-24-23 @ 05:02) (117/75 - 121/80)  RR: 18 (07-24-23 @ 05:02) (16 - 18)  SpO2: 98% (07-24-23 @ 05:02) (96% - 98%)  CAPILLARY BLOOD GLUCOSE      POCT Blood Glucose.: 122 mg/dL (23 Jul 2023 21:03)  POCT Blood Glucose.: 139 mg/dL (23 Jul 2023 17:00)  POCT Blood Glucose.: 147 mg/dL (23 Jul 2023 12:30)  POCT Blood Glucose.: 139 mg/dL (23 Jul 2023 07:36)    I&O's Detail      Physical exam:  General: A+O x 3 in NAD  HEENT: PERRLA, EOM intact  Neck: trachea midline  Chest: Clear through auscultation bilaterally, No rales, rhonchi wheezes noted bilaterally  Heart: S1,S1 RRR, no murmurs noted  Abdomen: soft non distended, tender to deep palpation 3/10,  BS x 4, no guarding noted  Extremities: no edema noted, warm,  no calf tenderness     MEDICATIONS  (STANDING):  dextrose 5%. 1000 milliLiter(s) (50 mL/Hr) IV Continuous <Continuous>  dextrose 5%. 1000 milliLiter(s) (100 mL/Hr) IV Continuous <Continuous>  dextrose 50% Injectable 25 Gram(s) IV Push once  dextrose 50% Injectable 12.5 Gram(s) IV Push once  dextrose 50% Injectable 25 Gram(s) IV Push once  glucagon  Injectable 1 milliGRAM(s) IntraMuscular once  glucagon  Injectable 1 milliGRAM(s) IntraMuscular once  insulin lispro (ADMELOG) corrective regimen sliding scale   SubCutaneous three times a day before meals  insulin lispro (ADMELOG) corrective regimen sliding scale   SubCutaneous at bedtime  insulin lispro (ADMELOG) corrective regimen sliding scale   SubCutaneous every 6 hours  mesalamine DR Capsule 1200 milliGRAM(s) Oral three times a day  pantoprazole  Injectable 40 milliGRAM(s) IV Push daily  simethicone 80 milliGRAM(s) Chew three times a day    MEDICATIONS  (PRN):  acetaminophen     Tablet .. 650 milliGRAM(s) Oral every 6 hours PRN Temp greater or equal to 38C (100.4F), Mild Pain (1 - 3), Moderate Pain (4 - 6), Severe Pain (7 - 10)  dextrose Oral Gel 15 Gram(s) Oral once PRN Blood Glucose LESS THAN 70 milliGRAM(s)/deciliter  melatonin 3 milliGRAM(s) Oral at bedtime PRN Insomnia  ondansetron Injectable 4 milliGRAM(s) IV Push every 8 hours PRN Nausea and/or Vomiting      LABS:                        12.9   10.93 )-----------( 224      ( 23 Jul 2023 06:40 )             37.5     07-23    134<L>  |  101  |  5<L>  ----------------------------<  141<H>  3.7   |  24  |  0.47<L>    Ca    7.2<L>      23 Jul 2023 06:40  Phos  2.6     07-23  Mg     1.9     07-23    TPro  3.8<L>  /  Alb  1.1<L>  /  TBili  1.1  /  DBili  x   /  AST  15  /  ALT  28  /  AlkPhos  20<L>  07-23      Urinalysis Basic - ( 23 Jul 2023 06:40 )    Color: x / Appearance: x / SG: x / pH: x  Gluc: 141 mg/dL / Ketone: x  / Bili: x / Urobili: x   Blood: x / Protein: x / Nitrite: x   Leuk Esterase: x / RBC: x / WBC x   Sq Epi: x / Non Sq Epi: x / Bacteria: x    RADIOLOGY & ADDITIONAL STUDIES:    Assessment/Plan  Patient is a 48y old  Male who presents with a chief complaint of abdominal pain (23 Jul 2023 12:40)    Continue current care  Diet - consistent carb w evening snack - tolerating  Nutrition consult - pt request  Monitor labs and electrolyte - repleat as needed  GI/DVT prophylaxis  Analgesia prn  OOB/ encouraging ambulation on the floor   Incentive spirometry/Cough/Deep breathing exercises  Sequentials  d/c planning to home  Will discuss with Dr. Correa         SURGERY Progress Note PA    48y admitted 7/15 for enteritis    SUBJECTIVE:   Patient seen at bedside, no overnight events, no complaints noted and pain is controlled at this time 3/10 with palpation, 2/10 at rest.   Patient admits to flatus, bowel movement yesterday 7/23.   Patient denies any headaches, chest pain, shortness of breath, nausea, vomiting, fever, chills, weakness, dysuria  Patient A+Ox3 in NAD at time of visit.    OBJECTIVE:   T(C): 37.2 (07-24-23 @ 05:02), Max: 37.2 (07-24-23 @ 05:02)  HR: 83 (07-24-23 @ 05:02) (83 - 93)  BP: 117/75 (07-24-23 @ 05:02) (117/75 - 121/80)  RR: 18 (07-24-23 @ 05:02) (16 - 18)  SpO2: 98% (07-24-23 @ 05:02) (96% - 98%)  CAPILLARY BLOOD GLUCOSE      POCT Blood Glucose.: 122 mg/dL (23 Jul 2023 21:03)  POCT Blood Glucose.: 139 mg/dL (23 Jul 2023 17:00)  POCT Blood Glucose.: 147 mg/dL (23 Jul 2023 12:30)  POCT Blood Glucose.: 139 mg/dL (23 Jul 2023 07:36)    I&O's Detail      Physical exam:  General: A+O x 3 in NAD  HEENT: PERRLA, EOM intact  Neck: trachea midline  Chest: Clear through auscultation bilaterally, No rales, rhonchi wheezes noted bilaterally  Heart: S1,S1 RRR, no murmurs noted  Abdomen: soft non distended, tender to deep palpation 3/10,  BS x 4, no guarding noted  Extremities: no edema noted, warm,  no calf tenderness     MEDICATIONS  (STANDING):  dextrose 5%. 1000 milliLiter(s) (50 mL/Hr) IV Continuous <Continuous>  dextrose 5%. 1000 milliLiter(s) (100 mL/Hr) IV Continuous <Continuous>  dextrose 50% Injectable 25 Gram(s) IV Push once  dextrose 50% Injectable 12.5 Gram(s) IV Push once  dextrose 50% Injectable 25 Gram(s) IV Push once  glucagon  Injectable 1 milliGRAM(s) IntraMuscular once  glucagon  Injectable 1 milliGRAM(s) IntraMuscular once  insulin lispro (ADMELOG) corrective regimen sliding scale   SubCutaneous three times a day before meals  insulin lispro (ADMELOG) corrective regimen sliding scale   SubCutaneous at bedtime  insulin lispro (ADMELOG) corrective regimen sliding scale   SubCutaneous every 6 hours  mesalamine DR Capsule 1200 milliGRAM(s) Oral three times a day  pantoprazole  Injectable 40 milliGRAM(s) IV Push daily  simethicone 80 milliGRAM(s) Chew three times a day    MEDICATIONS  (PRN):  acetaminophen     Tablet .. 650 milliGRAM(s) Oral every 6 hours PRN Temp greater or equal to 38C (100.4F), Mild Pain (1 - 3), Moderate Pain (4 - 6), Severe Pain (7 - 10)  dextrose Oral Gel 15 Gram(s) Oral once PRN Blood Glucose LESS THAN 70 milliGRAM(s)/deciliter  melatonin 3 milliGRAM(s) Oral at bedtime PRN Insomnia  ondansetron Injectable 4 milliGRAM(s) IV Push every 8 hours PRN Nausea and/or Vomiting      LABS:                        12.9   10.93 )-----------( 224      ( 23 Jul 2023 06:40 )             37.5     07-23    134<L>  |  101  |  5<L>  ----------------------------<  141<H>  3.7   |  24  |  0.47<L>    Ca    7.2<L>      23 Jul 2023 06:40  Phos  2.6     07-23  Mg     1.9     07-23    TPro  3.8<L>  /  Alb  1.1<L>  /  TBili  1.1  /  DBili  x   /  AST  15  /  ALT  28  /  AlkPhos  20<L>  07-23      Urinalysis Basic - ( 23 Jul 2023 06:40 )    Color: x / Appearance: x / SG: x / pH: x  Gluc: 141 mg/dL / Ketone: x  / Bili: x / Urobili: x   Blood: x / Protein: x / Nitrite: x   Leuk Esterase: x / RBC: x / WBC x   Sq Epi: x / Non Sq Epi: x / Bacteria: x    RADIOLOGY & ADDITIONAL STUDIES:    Assessment/Plan  Patient is a 48y old  Male who presents with a chief complaint of abdominal pain (23 Jul 2023 12:40)    Continue current care  Diet - consistent carb w evening snack - tolerating  Nutrition consult - pt request  Monitor labs and electrolyte - repleat as needed  GI/DVT prophylaxis  Analgesia prn  OOB/ encouraging ambulation on the floor   Incentive spirometry/Cough/Deep breathing exercises  Sequentials  d/c planning to home  Will discuss with Dr. Correa         SURGERY Progress Note PA    48y admitted 7/15 for enteritis    SUBJECTIVE:   Patient seen at bedside, no overnight events, no complaints noted and pain is controlled at this time 3/10 with palpation, 2/10 at rest.   Patient admits to flatus, bowel movement yesterday 7/23.   Patient denies any headaches, chest pain, shortness of breath, nausea, vomiting, fever, chills, weakness, dysuria  Patient A+Ox3 in NAD at time of visit.    OBJECTIVE:   T(C): 37.2 (07-24-23 @ 05:02), Max: 37.2 (07-24-23 @ 05:02)  HR: 83 (07-24-23 @ 05:02) (83 - 93)  BP: 117/75 (07-24-23 @ 05:02) (117/75 - 121/80)  RR: 18 (07-24-23 @ 05:02) (16 - 18)  SpO2: 98% (07-24-23 @ 05:02) (96% - 98%)  CAPILLARY BLOOD GLUCOSE      POCT Blood Glucose.: 122 mg/dL (23 Jul 2023 21:03)  POCT Blood Glucose.: 139 mg/dL (23 Jul 2023 17:00)  POCT Blood Glucose.: 147 mg/dL (23 Jul 2023 12:30)  POCT Blood Glucose.: 139 mg/dL (23 Jul 2023 07:36)    I&O's Detail      Physical exam:  General: A+O x 3 in NAD  HEENT: PERRLA, EOM intact  Neck: trachea midline  Chest: Clear through auscultation bilaterally, No rales, rhonchi wheezes noted bilaterally  Heart: S1,S1 RRR, no murmurs noted  Abdomen: soft non distended, tender to deep palpation 3/10,  BS x 4, no guarding noted  Extremities: no edema noted, warm,  no calf tenderness     MEDICATIONS  (STANDING):  dextrose 5%. 1000 milliLiter(s) (50 mL/Hr) IV Continuous <Continuous>  dextrose 5%. 1000 milliLiter(s) (100 mL/Hr) IV Continuous <Continuous>  dextrose 50% Injectable 25 Gram(s) IV Push once  dextrose 50% Injectable 12.5 Gram(s) IV Push once  dextrose 50% Injectable 25 Gram(s) IV Push once  glucagon  Injectable 1 milliGRAM(s) IntraMuscular once  glucagon  Injectable 1 milliGRAM(s) IntraMuscular once  insulin lispro (ADMELOG) corrective regimen sliding scale   SubCutaneous three times a day before meals  insulin lispro (ADMELOG) corrective regimen sliding scale   SubCutaneous at bedtime  insulin lispro (ADMELOG) corrective regimen sliding scale   SubCutaneous every 6 hours  mesalamine DR Capsule 1200 milliGRAM(s) Oral three times a day  pantoprazole  Injectable 40 milliGRAM(s) IV Push daily  simethicone 80 milliGRAM(s) Chew three times a day    MEDICATIONS  (PRN):  acetaminophen     Tablet .. 650 milliGRAM(s) Oral every 6 hours PRN Temp greater or equal to 38C (100.4F), Mild Pain (1 - 3), Moderate Pain (4 - 6), Severe Pain (7 - 10)  dextrose Oral Gel 15 Gram(s) Oral once PRN Blood Glucose LESS THAN 70 milliGRAM(s)/deciliter  melatonin 3 milliGRAM(s) Oral at bedtime PRN Insomnia  ondansetron Injectable 4 milliGRAM(s) IV Push every 8 hours PRN Nausea and/or Vomiting      LABS:                        12.9   10.93 )-----------( 224      ( 23 Jul 2023 06:40 )             37.5     07-23    134<L>  |  101  |  5<L>  ----------------------------<  141<H>  3.7   |  24  |  0.47<L>    Ca    7.2<L>      23 Jul 2023 06:40  Phos  2.6     07-23  Mg     1.9     07-23    TPro  3.8<L>  /  Alb  1.1<L>  /  TBili  1.1  /  DBili  x   /  AST  15  /  ALT  28  /  AlkPhos  20<L>  07-23      Urinalysis Basic - ( 23 Jul 2023 06:40 )    Color: x / Appearance: x / SG: x / pH: x  Gluc: 141 mg/dL / Ketone: x  / Bili: x / Urobili: x   Blood: x / Protein: x / Nitrite: x   Leuk Esterase: x / RBC: x / WBC x   Sq Epi: x / Non Sq Epi: x / Bacteria: x    RADIOLOGY & ADDITIONAL STUDIES:    Assessment/Plan  Patient is a 48y old male who presents with a chief complaint of abdominal pain -> Enteritis vs partial SBO    Continue current care  Diet - consistent carb w evening snack - tolerating  Nutrition consult - pt request  Awaiting/Monitor labs and electrolyte - repleat as needed  GI/DVT prophylaxis  Analgesia prn  OOB/ encouraging ambulation on the floor   Incentive spirometry/Cough/Deep breathing exercises  Sequentials  d/c planning to home  Will discuss with Dr. Correa         SURGERY Progress Note PA    48y admitted 7/15 for enteritis    SUBJECTIVE:   Patient seen at bedside, no overnight events, no complaints noted and pain is controlled at this time 3/10 with palpation, 2/10 at rest.   Patient admits to flatus, bowel movement yesterday 7/23.   Patient denies any headaches, chest pain, shortness of breath, nausea, vomiting, fever, chills, weakness, dysuria  Patient A+Ox3 in NAD at time of visit.    OBJECTIVE:   T(C): 37.2 (07-24-23 @ 05:02), Max: 37.2 (07-24-23 @ 05:02)  HR: 83 (07-24-23 @ 05:02) (83 - 93)  BP: 117/75 (07-24-23 @ 05:02) (117/75 - 121/80)  RR: 18 (07-24-23 @ 05:02) (16 - 18)  SpO2: 98% (07-24-23 @ 05:02) (96% - 98%)  CAPILLARY BLOOD GLUCOSE      POCT Blood Glucose.: 122 mg/dL (23 Jul 2023 21:03)  POCT Blood Glucose.: 139 mg/dL (23 Jul 2023 17:00)  POCT Blood Glucose.: 147 mg/dL (23 Jul 2023 12:30)  POCT Blood Glucose.: 139 mg/dL (23 Jul 2023 07:36)    I&O's Detail      Physical exam:  General: A+O x 3 in NAD  HEENT: PERRLA, EOM intact  Neck: trachea midline  Chest: Clear through auscultation bilaterally, No rales, rhonchi wheezes noted bilaterally  Heart: S1,S1 RRR, no murmurs noted  Abdomen: soft non distended, tender to deep palpation 3/10,  BS x 4, no guarding noted  Extremities: no edema noted, warm,  no calf tenderness     MEDICATIONS  (STANDING):  dextrose 5%. 1000 milliLiter(s) (50 mL/Hr) IV Continuous <Continuous>  dextrose 5%. 1000 milliLiter(s) (100 mL/Hr) IV Continuous <Continuous>  dextrose 50% Injectable 25 Gram(s) IV Push once  dextrose 50% Injectable 12.5 Gram(s) IV Push once  dextrose 50% Injectable 25 Gram(s) IV Push once  glucagon  Injectable 1 milliGRAM(s) IntraMuscular once  glucagon  Injectable 1 milliGRAM(s) IntraMuscular once  insulin lispro (ADMELOG) corrective regimen sliding scale   SubCutaneous three times a day before meals  insulin lispro (ADMELOG) corrective regimen sliding scale   SubCutaneous at bedtime  insulin lispro (ADMELOG) corrective regimen sliding scale   SubCutaneous every 6 hours  mesalamine DR Capsule 1200 milliGRAM(s) Oral three times a day  pantoprazole  Injectable 40 milliGRAM(s) IV Push daily  simethicone 80 milliGRAM(s) Chew three times a day    MEDICATIONS  (PRN):  acetaminophen     Tablet .. 650 milliGRAM(s) Oral every 6 hours PRN Temp greater or equal to 38C (100.4F), Mild Pain (1 - 3), Moderate Pain (4 - 6), Severe Pain (7 - 10)  dextrose Oral Gel 15 Gram(s) Oral once PRN Blood Glucose LESS THAN 70 milliGRAM(s)/deciliter  melatonin 3 milliGRAM(s) Oral at bedtime PRN Insomnia  ondansetron Injectable 4 milliGRAM(s) IV Push every 8 hours PRN Nausea and/or Vomiting      LABS:                        12.9   10.93 )-----------( 224      ( 23 Jul 2023 06:40 )             37.5     07-23    134<L>  |  101  |  5<L>  ----------------------------<  141<H>  3.7   |  24  |  0.47<L>    Ca    7.2<L>      23 Jul 2023 06:40  Phos  2.6     07-23  Mg     1.9     07-23    TPro  3.8<L>  /  Alb  1.1<L>  /  TBili  1.1  /  DBili  x   /  AST  15  /  ALT  28  /  AlkPhos  20<L>  07-23      Urinalysis Basic - ( 23 Jul 2023 06:40 )    Color: x / Appearance: x / SG: x / pH: x  Gluc: 141 mg/dL / Ketone: x  / Bili: x / Urobili: x   Blood: x / Protein: x / Nitrite: x   Leuk Esterase: x / RBC: x / WBC x   Sq Epi: x / Non Sq Epi: x / Bacteria: x    RADIOLOGY & ADDITIONAL STUDIES:    Assessment/Plan  Patient is a 48y old male who presents with a chief complaint of abdominal pain -> Enteritis vs partial SBO    Continue current care  Diet - consistent carb w evening snack - tolerating  Nutrition consult - pt request  Awaiting/Monitor labs and electrolyte - repleat as needed  GI/DVT prophylaxis  Analgesia prn  OOB/ encouraging ambulation on the floor   Incentive spirometry/Cough/Deep breathing exercises  Sequentials  d/c planning to home  Discussed with Dr. Correa

## 2023-07-24 NOTE — PROGRESS NOTE ADULT - TIME BILLING
Note written by attending. Meds, labs, vitals, chart reviewed. D/w patient, RN
Obtaining history/physical exam, reviewing labs and imaging studies, coordinating care with multidisciplinary team and nursing staff and discussing patient with surgical PA covering. Greater than 50% of the time was spent with direct face to face patient interaction, discussing with the patient and family, and answering all questions.

## 2023-07-24 NOTE — CHART NOTE - NSCHARTNOTEFT_GEN_A_CORE
Assessment: Per H&P "Patient is a 47 yo M with PMH of IDDM who presented to ED yesterday with complaint of elevated glucose and nausea. He reported abdominal pain, fever, and an episode of vomiting. He reports he stopped his insulin 2 weeks ago as he says his sugars were under control and no longer needed it. He had a negative workup for DKA and was given oral potassium and was discharged after feeling better with instructions to resume his insulin and followup with his PCP. He returned today   returned today with reports of fever and worsening abdominal pain. Denies diarrhea. He reports he is a cigar smoker. "    7/24  New consult ordered for assessment/education.  Pt previous NPO 7/15-7/16, full liquid 7/17-7/19, downgraded to NPO/clears 7/19-7/22 given po intolerance. Per sx note "The cause of his obstruction is inflammation and the obstruction will improve as the inflammation improves." Diet advanced to Con CHO yesterday 7/13.  Pt reports food has been "unsatisfactory", discussed meal preferences to optimize po intake and tolerance.  Pt primary speaks mandarin and basic english, denies interest in  services.  Encouraged PO intake as tolerated.  Pt currently hypoalbuminemic, likely multifactorial including inadequate nutrition x 10days, inflammatory process/SBO/GI losses.  Recommend high protein supplement - ensure max (150kcal, 30g protein/8oz) bid to optimize nutrition status.  GI following, ordered for mesalamine.  Remains hyponatremic, nephro following; continues on iv fluids (D5NaCl+KCl@100ml).  Pertinent medications/nutrition labs reviewed; -188 x3 d, A1c 8.9%, noted on steroid, In house ordered for lispro sliding scale to aid in glucose management.  RD to follow up and will continue to monitor nutrition status.      Factors impacting intake: [ ] none [ ] nausea  [ ] vomiting [ ] diarrhea [ ] constipation  [ ]chewing problems [ ] swallowing issues  [x ] other: recent diet advancement, SBO     Diet Prescription: Diet, Consistent Carbohydrate w/Evening Snack (07-23-23 @ 10:36)    Intake: diet adv to con cho 7/23    Current Weight: Weight (kg): 79.7 (07-17 @ 16:09)  % Weight Change    Pertinent Medications: MEDICATIONS  (STANDING):  dextrose 5% + sodium chloride 0.9% with potassium chloride 20 mEq/L 1000 milliLiter(s) (100 mL/Hr) IV Continuous <Continuous>  dextrose 5%. 1000 milliLiter(s) (50 mL/Hr) IV Continuous <Continuous>  dextrose 5%. 1000 milliLiter(s) (100 mL/Hr) IV Continuous <Continuous>  dextrose 50% Injectable 25 Gram(s) IV Push once  dextrose 50% Injectable 12.5 Gram(s) IV Push once  dextrose 50% Injectable 25 Gram(s) IV Push once  glucagon  Injectable 1 milliGRAM(s) IntraMuscular once  glucagon  Injectable 1 milliGRAM(s) IntraMuscular once  insulin lispro (ADMELOG) corrective regimen sliding scale   SubCutaneous three times a day before meals  insulin lispro (ADMELOG) corrective regimen sliding scale   SubCutaneous at bedtime  insulin lispro (ADMELOG) corrective regimen sliding scale   SubCutaneous every 6 hours  mesalamine DR Capsule 1200 milliGRAM(s) Oral three times a day  pantoprazole  Injectable 40 milliGRAM(s) IV Push daily  simethicone 80 milliGRAM(s) Chew three times a day  sodium chloride 1 Gram(s) Oral two times a day    MEDICATIONS  (PRN):  acetaminophen     Tablet .. 650 milliGRAM(s) Oral every 6 hours PRN Temp greater or equal to 38C (100.4F), Mild Pain (1 - 3), Moderate Pain (4 - 6), Severe Pain (7 - 10)  dextrose Oral Gel 15 Gram(s) Oral once PRN Blood Glucose LESS THAN 70 milliGRAM(s)/deciliter  melatonin 3 milliGRAM(s) Oral at bedtime PRN Insomnia  ondansetron Injectable 4 milliGRAM(s) IV Push every 8 hours PRN Nausea and/or Vomiting    Pertinent Labs: 07-24 Na129 mmol/L<L> Glu 123 mg/dL<H> K+ 3.8 mmol/L Cr  0.45 mg/dL<L> BUN 7 mg/dL 07-24 Phos 2.6 mg/dL 07-24 Alb 1.2 g/dL<L>     CAPILLARY BLOOD GLUCOSE      POCT Blood Glucose.: 148 mg/dL (24 Jul 2023 11:51)  POCT Blood Glucose.: 115 mg/dL (24 Jul 2023 08:00)  POCT Blood Glucose.: 122 mg/dL (23 Jul 2023 21:03)  POCT Blood Glucose.: 139 mg/dL (23 Jul 2023 17:00)    Skin: intact    Estimated Needs:   [x ] no change since previous assessment  [ ] recalculated:     Previous Nutrition Diagnosis:   [ ] Inadequate Energy Intake [x ]Inadequate Oral Intake [ ] Excessive Energy Intake   [ ] Underweight [ ] Increased Nutrient Needs [ ] Overweight/Obesity   [ ] Altered GI Function [ ] Unintended Weight Loss [ ] Food & Nutrition Related Knowledge Deficit [ ] Malnutrition     Nutrition Diagnosis is [ x] ongoing  [ ] resolved [ ] not applicable     New Nutrition Diagnosis: [ ] not applicable       Interventions: Continue Con CHO diet; Monitor tolerance, GI function; add nutrition supplement; IVF per MD order  Recommend  [ ] Change Diet To:  [ ] Nutrition Supplement  [ ] Nutrition Support  [ ] Other:     Monitoring and Evaluation:   [x ] PO intake [ x ] Tolerance to diet prescription [ x ] weights [ x ] labs[ x ] follow up per protocol  [ ] other: Assessment: Per H&P "Patient is a 49 yo M with PMH of IDDM who presented to ED yesterday with complaint of elevated glucose and nausea. He reported abdominal pain, fever, and an episode of vomiting. He reports he stopped his insulin 2 weeks ago as he says his sugars were under control and no longer needed it. He had a negative workup for DKA and was given oral potassium and was discharged after feeling better with instructions to resume his insulin and followup with his PCP. He returned today   returned today with reports of fever and worsening abdominal pain. Denies diarrhea. He reports he is a cigar smoker. "    7/24  New consult ordered for assessment/education.  Pt previous NPO 7/15-7/16, full liquid 7/17-7/19, downgraded to NPO/clears 7/19-7/22 given po intolerance. Per sx note "The cause of his obstruction is inflammation and the obstruction will improve as the inflammation improves." Diet advanced to Con CHO yesterday 7/13.  Pt reports food has been "unsatisfactory", discussed meal preferences to optimize po intake and tolerance.  Pt primary speaks mandarin and basic english, denies interest in  services.  Encouraged PO intake as tolerated, especially of protein-rich foods.  Pt currently hypoalbuminemic, likely multifactorial including inadequate nutrition x 10days, inflammatory process/SBO/GI losses.  Recommend high protein supplement: ensure max (150kcal, 30g protein/8oz) once daily to optimize nutrition status - discussed with patient, however, appeared reluctant to trial even with encouragement; will follow up for acceptance.  GI following, ordered for mesalamine.  Remains hyponatremic, nephro following; continues on iv fluids (D5NaCl+KCl@100ml).  Pertinent medications/nutrition labs reviewed; -188 x last 3 d, A1c 8.9%, noted on steroid, In house ordered for lispro sliding scale to aid in glucose management.  RD to follow up and will continue to monitor nutrition status.      Factors impacting intake: [ ] none [ ] nausea  [ ] vomiting [ ] diarrhea [ ] constipation  [ ]chewing problems [ ] swallowing issues  [x ] other: recent diet advancement, SBO     Diet Prescription: Diet, Consistent Carbohydrate w/Evening Snack (07-23-23 @ 10:36)    Intake: diet adv to con cho 7/23    Current Weight: Weight (kg): 79.7 (07-17 @ 16:09)  % Weight Change    Pertinent Medications: MEDICATIONS  (STANDING):  dextrose 5% + sodium chloride 0.9% with potassium chloride 20 mEq/L 1000 milliLiter(s) (100 mL/Hr) IV Continuous <Continuous>  dextrose 5%. 1000 milliLiter(s) (50 mL/Hr) IV Continuous <Continuous>  dextrose 5%. 1000 milliLiter(s) (100 mL/Hr) IV Continuous <Continuous>  dextrose 50% Injectable 25 Gram(s) IV Push once  dextrose 50% Injectable 12.5 Gram(s) IV Push once  dextrose 50% Injectable 25 Gram(s) IV Push once  glucagon  Injectable 1 milliGRAM(s) IntraMuscular once  glucagon  Injectable 1 milliGRAM(s) IntraMuscular once  insulin lispro (ADMELOG) corrective regimen sliding scale   SubCutaneous three times a day before meals  insulin lispro (ADMELOG) corrective regimen sliding scale   SubCutaneous at bedtime  insulin lispro (ADMELOG) corrective regimen sliding scale   SubCutaneous every 6 hours  mesalamine DR Capsule 1200 milliGRAM(s) Oral three times a day  pantoprazole  Injectable 40 milliGRAM(s) IV Push daily  simethicone 80 milliGRAM(s) Chew three times a day  sodium chloride 1 Gram(s) Oral two times a day    MEDICATIONS  (PRN):  acetaminophen     Tablet .. 650 milliGRAM(s) Oral every 6 hours PRN Temp greater or equal to 38C (100.4F), Mild Pain (1 - 3), Moderate Pain (4 - 6), Severe Pain (7 - 10)  dextrose Oral Gel 15 Gram(s) Oral once PRN Blood Glucose LESS THAN 70 milliGRAM(s)/deciliter  melatonin 3 milliGRAM(s) Oral at bedtime PRN Insomnia  ondansetron Injectable 4 milliGRAM(s) IV Push every 8 hours PRN Nausea and/or Vomiting    Pertinent Labs: 07-24 Na129 mmol/L<L> Glu 123 mg/dL<H> K+ 3.8 mmol/L Cr  0.45 mg/dL<L> BUN 7 mg/dL 07-24 Phos 2.6 mg/dL 07-24 Alb 1.2 g/dL<L>     CAPILLARY BLOOD GLUCOSE      POCT Blood Glucose.: 148 mg/dL (24 Jul 2023 11:51)  POCT Blood Glucose.: 115 mg/dL (24 Jul 2023 08:00)  POCT Blood Glucose.: 122 mg/dL (23 Jul 2023 21:03)  POCT Blood Glucose.: 139 mg/dL (23 Jul 2023 17:00)    Skin: intact    Estimated Needs:   [x ] no change since previous assessment  [ ] recalculated:     Previous Nutrition Diagnosis:   [ ] Inadequate Energy Intake [x ]Inadequate Oral Intake [ ] Excessive Energy Intake   [ ] Underweight [ ] Increased Nutrient Needs [ ] Overweight/Obesity   [ ] Altered GI Function [ ] Unintended Weight Loss [ ] Food & Nutrition Related Knowledge Deficit [ ] Malnutrition     Nutrition Diagnosis is [ x] ongoing  [ ] resolved [ ] not applicable     New Nutrition Diagnosis: [ ] not applicable       Interventions: Continue Con CHO diet; Monitor tolerance, GI function; add nutrition supplement; IVF per MD order  Recommend  [ ] Change Diet To:  [x ] Nutrition Supplement - ensure max once daily   [ ] Nutrition Support  [ ] Other:     Monitoring and Evaluation:   [x ] PO intake [ x ] Tolerance to diet prescription [ x ] weights [ x ] labs[ x ] follow up per protocol  [ ] other:

## 2023-07-24 NOTE — PROGRESS NOTE ADULT - ASSESSMENT
SBO/Enteritis/Gastroparesis  IVF  CTE reviewed  ppi bid  asca and anca negative  IVAB  OOB  surg fu noted  will start mesalamine empirically can dc home on 2 week course  fu in office in 1 week  will need egd and colonoscopy

## 2023-07-24 NOTE — PROGRESS NOTE ADULT - NS ATTEND AMEND GEN_ALL_CORE FT
I have personally seen and examined the patient.  I fully participated in the care of this patient.  I have made amendments to the documentation where necessary, and agree with the history, physical exam, impression/assessment, and plan as documented by the PA    Patient admitted with enteritis, SBO s/p NGT with resolution of obstructive symptoms and diet was advanced - surgery had signed off.  Reconsulted yesterday evening as patient had nausea/vomiting and AXR was concerning for SBO. Patient was kept NPO and refused NGT.    This AM patient reports no nausea, however does not think he is passing flatus. He does not feel as poorly as on admission when he had diffuse abdominal pain. He wishes to pursue 'the most conservative management possible'. We will hold off on NGT for now.  Will do gastrograffin SBFT for therapeutic + diagnostic purposes  Keep NPO/IVF  Aggressive electrolyte repletions - K>4, Phos>3, Mg>2 as electrolyte derangements can lead to ileus as may be the case in this patient
I have personally seen and examined the patient.  I fully participated in the care of this patient.  I have made amendments to the documentation where necessary, and agree with the history, physical exam, impression/assessment, and plan as documented by the PA    Patient reports he feels better today  less abdominal pain, no nausea, having bowel movements  tolerating clears  abdomen is soft, nondistended, minimally tender    enteritis   partial SBO, now resolved  -advance diet as tolerated  -GI f/u  -no acute surgical intervention  -medical management of enteritis  -will s/o please reconsult as needed  dw patient and all questions answered
I have personally seen and examined the patient.  I fully participated in the care of this patient.  I have made amendments to the documentation where necessary, and agree with the history, physical exam, impression/assessment, and plan as documented by the PA    Patient without nausea but no bowel function yet  Of note patient continues to be hypokalemic which can contribute to ileus  abd     gastrograffin SBFT yesterday with concern for partial obstruction   Recommend CT imaging to re-evaluate enteritis  Keep NPO/IVF  GI f/u  Again, recommend aggressive electrolyte repletions to maintain K>4, Phos>3, Mg>2 as electrolyte derangements can lead to ileus as may be the case in this patient - failure to resolve as he continues to have significant hypokalemia   No indication for surgical intervention - partial obstruction 2/2 enteritis
I have personally seen and examined the patient.  I fully participated in the care of this patient.  I have made amendments to the documentation where necessary, and agree with the history, physical exam, impression/assessment, and plan as documented by the PA    pSBO 2/2 enteritis, clinically improving  Patient reports passing flatus and having BMs  Abd soft, ND, NT    pSBO now resolved  Enteritis -  management per GI ?IBD    will s/o please reconsult as needed

## 2023-07-24 NOTE — PROGRESS NOTE ADULT - SUBJECTIVE AND OBJECTIVE BOX
[facility], Division of Infectious Diseases  [facility], [facility], [facility]  [phone]    Name: ANGEL RAMAN  Age: 48y  Gender: Male  MRN: [id number]    Interval History:  Patient seen and examined at bedside  Afebrile, feeling better  No acute overnight events.   Notes reviewed    Antibiotics:      Medications:  acetaminophen     Tablet .. 650 milliGRAM(s) Oral every 6 hours PRN  dextrose 5%. 1000 milliLiter(s) IV Continuous <Continuous>  dextrose 5%. 1000 milliLiter(s) IV Continuous <Continuous>  dextrose 50% Injectable 25 Gram(s) IV Push once  dextrose 50% Injectable 12.5 Gram(s) IV Push once  dextrose 50% Injectable 25 Gram(s) IV Push once  dextrose Oral Gel 15 Gram(s) Oral once PRN  glucagon  Injectable 1 milliGRAM(s) IntraMuscular once  glucagon  Injectable 1 milliGRAM(s) IntraMuscular once  insulin lispro (ADMELOG) corrective regimen sliding scale   SubCutaneous three times a day before meals  insulin lispro (ADMELOG) corrective regimen sliding scale   SubCutaneous at bedtime  insulin lispro (ADMELOG) corrective regimen sliding scale   SubCutaneous every 6 hours  melatonin 3 milliGRAM(s) Oral at bedtime PRN  mesalamine DR Capsule 1200 milliGRAM(s) Oral three times a day  ondansetron Injectable 4 milliGRAM(s) IV Push every 8 hours PRN  pantoprazole  Injectable 40 milliGRAM(s) IV Push daily  simethicone 80 milliGRAM(s) Chew three times a day      Review of Systems:  A 10-point review of systems was obtained.     Pertinent positives and negatives--  Constitutional: No fevers. No Chills. No Rigors.   Cardiovascular: No chest pain. No palpitations.  Respiratory: No shortness of breath. No cough.  Gastrointestinal: No nausea or vomiting. No diarrhea or constipation.   Psychiatric: Pleasant. Appropriate affect.    Review of systems otherwise negative except as previously noted.    Allergies: No Known Allergies    For details regarding the patient's past medical history, social history, family history, and other miscellaneous elements, please refer the initial infectious diseases consultation and/or the admitting history and physical examination for this admission.    Objective:  Vitals:   T(C): 37.2 (07-24-23 @ 05:02), Max: 37.2 (07-24-23 @ 05:02)  HR: 83 (07-24-23 @ 05:02) (83 - 93)  BP: 117/75 (07-24-23 @ 05:02) (117/75 - 121/80)  RR: 18 (07-24-23 @ 05:02) (16 - 18)  SpO2: 98% (07-24-23 @ 05:02) (96% - 98%)    Physical Examination:  General: no acute distress  HEENT: NC/AT, EOMI  Cardio: S1, S2 heard, RRR, no murmurs  Resp: breath sounds heard bilaterally, no rales, wheezes or rhonchi  Abd:  distended  Ext: no edema or cyanosis  Skin: warm, dry, no visible rash      Laboratory Studies:  CBC:                       13.3   12.60 )-----------( 278      ( 24 Jul 2023 06:00 )             38.0     CMP: 07-24    129<L>  |  96  |  7   ----------------------------<  123<H>  3.8   |  24  |  0.45<L>    Ca    7.5<L>      24 Jul 2023 06:00  Phos  2.6     07-24  Mg     1.8     07-24    TPro  4.4<L>  /  Alb  1.2<L>  /  TBili  0.9  /  DBili  x   /  AST  20  /  ALT  33  /  AlkPhos  39  07-24    LIVER FUNCTIONS - ( 24 Jul 2023 06:00 )  Alb: 1.2 g/dL / Pro: 4.4 g/dL / ALK PHOS: 39 U/L / ALT: 33 U/L DA / AST: 20 U/L / GGT: x           Urinalysis Basic - ( 24 Jul 2023 06:00 )    Color: x / Appearance: x / SG: x / pH: x  Gluc: 123 mg/dL / Ketone: x  / Bili: x / Urobili: x   Blood: x / Protein: x / Nitrite: x   Leuk Esterase: x / RBC: x / WBC x   Sq Epi: x / Non Sq Epi: x / Bacteria: x        Microbiology: reviewed    Culture - Blood (collected 07-20-23 @ 16:37)  Source: .Blood Blood-Peripheral  Preliminary Report (07-24-23 @ 01:01):    No growth at 72 Hours    Culture - Blood (collected 07-20-23 @ 16:37)  Source: .Blood Blood-Peripheral  Preliminary Report (07-24-23 @ 01:01):    No growth at 72 Hours    Culture - Blood (collected 07-17-23 @ 18:41)  Source: .Blood Blood-Venous  Final Report (07-23-23 @ 01:01):    No growth at 5 days    Culture - Blood (collected 07-17-23 @ 18:00)  Source: .Blood Blood-Venous  Final Report (07-23-23 @ 01:01):    No growth at 5 days    Culture - Urine (collected 07-15-23 @ 15:37)  Source: Clean Catch Clean Catch (Midstream)  Final Report (07-16-23 @ 23:37):    <10,000 CFU/mL Normal Urogenital Swati    Culture - Blood (collected 07-15-23 @ 12:59)  Source: .Blood Blood-Peripheral  Final Report (07-20-23 @ 23:00):    No growth at 5 days    Culture - Blood (collected 07-15-23 @ 12:52)  Source: .Blood Blood-Peripheral  Final Report (07-20-23 @ 23:00):    No growth at 5 days          Radiology: reviewed

## 2023-07-24 NOTE — PROGRESS NOTE ADULT - SUBJECTIVE AND OBJECTIVE BOX
INTERVAL HPI/OVERNIGHT EVENTS:  tolerating diet  MEDICATIONS  (STANDING):  dextrose 5% + sodium chloride 0.9% with potassium chloride 20 mEq/L 1000 milliLiter(s) (100 mL/Hr) IV Continuous <Continuous>  dextrose 5%. 1000 milliLiter(s) (50 mL/Hr) IV Continuous <Continuous>  dextrose 5%. 1000 milliLiter(s) (100 mL/Hr) IV Continuous <Continuous>  dextrose 50% Injectable 25 Gram(s) IV Push once  dextrose 50% Injectable 12.5 Gram(s) IV Push once  dextrose 50% Injectable 25 Gram(s) IV Push once  glucagon  Injectable 1 milliGRAM(s) IntraMuscular once  glucagon  Injectable 1 milliGRAM(s) IntraMuscular once  insulin lispro (ADMELOG) corrective regimen sliding scale   SubCutaneous three times a day before meals  insulin lispro (ADMELOG) corrective regimen sliding scale   SubCutaneous at bedtime  insulin lispro (ADMELOG) corrective regimen sliding scale   SubCutaneous every 6 hours  mesalamine DR Capsule 1200 milliGRAM(s) Oral three times a day  pantoprazole  Injectable 40 milliGRAM(s) IV Push daily  simethicone 80 milliGRAM(s) Chew three times a day  sodium chloride 1 Gram(s) Oral two times a day    MEDICATIONS  (PRN):  acetaminophen     Tablet .. 650 milliGRAM(s) Oral every 6 hours PRN Temp greater or equal to 38C (100.4F), Mild Pain (1 - 3), Moderate Pain (4 - 6), Severe Pain (7 - 10)  dextrose Oral Gel 15 Gram(s) Oral once PRN Blood Glucose LESS THAN 70 milliGRAM(s)/deciliter  melatonin 3 milliGRAM(s) Oral at bedtime PRN Insomnia  ondansetron Injectable 4 milliGRAM(s) IV Push every 8 hours PRN Nausea and/or Vomiting      Allergies    No Known Allergies    Intolerances        Review of Systems:    General:  No wt loss, fevers, chills, night sweats,fatigue,   Eyes:  Good vision, no reported pain  ENT:  No sore throat, pain, runny nose, dysphagia  CV:  No pain, palpitatioins, hypo/hypertension  Resp:  No dyspnea, cough, tachypnea, wheezing  GI:  No pain, No nausea, No vomiting, No diarrhea, No constipatiion, No weight loss, No fever, No pruritis, No rectal bleeding, No tarry stools, No dysphagia,  :  No pain, bleeding, incontinence, nocturia  Muscle:  No pain, weakness  Neuro:  No weakness, tingling, memory problems  Psych:  No fatigue, insomnia, mood problems, depression  Endocrine:  No polyuria, polydypsia, cold/heat intolerance  Heme:  No petechiae, ecchymosis, easy bruisability  Skin:  No rash, tattoos, scars, edema      Vital Signs Last 24 Hrs  T(C): 36.9 (24 Jul 2023 13:00), Max: 37.2 (24 Jul 2023 05:02)  T(F): 98.5 (24 Jul 2023 13:00), Max: 98.9 (24 Jul 2023 05:02)  HR: 91 (24 Jul 2023 13:00) (83 - 93)  BP: 121/79 (24 Jul 2023 13:00) (117/75 - 121/79)  BP(mean): 89 (24 Jul 2023 05:02) (89 - 89)  RR: 18 (24 Jul 2023 13:00) (18 - 18)  SpO2: 95% (24 Jul 2023 13:00) (95% - 98%)    Parameters below as of 24 Jul 2023 13:00  Patient On (Oxygen Delivery Method): room air        PHYSICAL EXAM:    Constitutional: NAD, well-developed  HEENT: EOMI, throat clear  Neck: No LAD, supple  Respiratory: CTA and P  Cardiovascular: S1 and S2, RRR, no M  Gastrointestinal: BS+, soft, NT/ND, neg HSM,  Extremities: No peripheral edema, neg clubing, cyanosis  Vascular: 2+ peripheral pulses  Neurological: A/O x 3, no focal deficits  Psychiatric: Normal mood, normal affect  Skin: No rashes      LABS:                        13.3   12.60 )-----------( 278      ( 24 Jul 2023 06:00 )             38.0     07-24    129<L>  |  96  |  7   ----------------------------<  123<H>  3.8   |  24  |  0.45<L>    Ca    7.5<L>      24 Jul 2023 06:00  Phos  2.6     07-24  Mg     1.8     07-24    TPro  4.4<L>  /  Alb  1.2<L>  /  TBili  0.9  /  DBili  x   /  AST  20  /  ALT  33  /  AlkPhos  39  07-24      Urinalysis Basic - ( 24 Jul 2023 06:00 )    Color: x / Appearance: x / SG: x / pH: x  Gluc: 123 mg/dL / Ketone: x  / Bili: x / Urobili: x   Blood: x / Protein: x / Nitrite: x   Leuk Esterase: x / RBC: x / WBC x   Sq Epi: x / Non Sq Epi: x / Bacteria: x        RADIOLOGY & ADDITIONAL TESTS:

## 2023-07-24 NOTE — PROGRESS NOTE ADULT - ASSESSMENT
47 yo M with PMH of DM who presented to ED with complaint of elevated glucose and nausea, abdominal pain, fever, and an episode of vomiting. Denies diarrhea.   ED course: CBC showed bandemia increased from 18 to 25. CT showed enteritis and unable to rule out partial SBO. findings d/w Dr. Correa who advised NG tube and admission for further testing.     Enteritis  Partial SBO  - febrile with bandemia, abd pain and imaging and exam suggesting abd process/enteritis  - GI PCR negative  - all cx NGTD  - s/p vanc x48H  - s/p zosyn x7 day course  Recommendations:   - monitor off ABx  - serial abdominal exams  - trend temps/WBC  - appreciate surgery recs  - appreciate GI recs  - additional management per primary team    Infectious Diseases will continue to follow. Please call with any questions.   Luli Franz M.D.  [facility] [place] Division of Infectious Diseases [phone]

## 2023-07-24 NOTE — PROGRESS NOTE ADULT - SUBJECTIVE AND OBJECTIVE BOX
Patient is a 48y old  Male who presents with a chief complaint of Enteritis vs possible partial SBO (24 Jul 2023 07:11)      INTERVAL History of Present Illness/OVERNIGHT EVENTS: vague symptomatology    MEDICATIONS  (STANDING):  dextrose 5%. 1000 milliLiter(s) (50 mL/Hr) IV Continuous <Continuous>  dextrose 5%. 1000 milliLiter(s) (100 mL/Hr) IV Continuous <Continuous>  dextrose 50% Injectable 25 Gram(s) IV Push once  dextrose 50% Injectable 12.5 Gram(s) IV Push once  dextrose 50% Injectable 25 Gram(s) IV Push once  glucagon  Injectable 1 milliGRAM(s) IntraMuscular once  glucagon  Injectable 1 milliGRAM(s) IntraMuscular once  insulin lispro (ADMELOG) corrective regimen sliding scale   SubCutaneous three times a day before meals  insulin lispro (ADMELOG) corrective regimen sliding scale   SubCutaneous at bedtime  insulin lispro (ADMELOG) corrective regimen sliding scale   SubCutaneous every 6 hours  mesalamine DR Capsule 1200 milliGRAM(s) Oral three times a day  pantoprazole  Injectable 40 milliGRAM(s) IV Push daily  simethicone 80 milliGRAM(s) Chew three times a day  sodium chloride 1 Gram(s) Oral two times a day    MEDICATIONS  (PRN):  acetaminophen     Tablet .. 650 milliGRAM(s) Oral every 6 hours PRN Temp greater or equal to 38C (100.4F), Mild Pain (1 - 3), Moderate Pain (4 - 6), Severe Pain (7 - 10)  dextrose Oral Gel 15 Gram(s) Oral once PRN Blood Glucose LESS THAN 70 milliGRAM(s)/deciliter  melatonin 3 milliGRAM(s) Oral at bedtime PRN Insomnia  ondansetron Injectable 4 milliGRAM(s) IV Push every 8 hours PRN Nausea and/or Vomiting      Allergies    No Known Allergies    Intolerances        REVIEW OF SYSTEMS:  Other systems currently negative unless otherwise specified above.    Vital Signs Last 24 Hrs  T(C): 37.2 (24 Jul 2023 05:02), Max: 37.2 (24 Jul 2023 05:02)  T(F): 98.9 (24 Jul 2023 05:02), Max: 98.9 (24 Jul 2023 05:02)  HR: 83 (24 Jul 2023 05:02) (83 - 93)  BP: 117/75 (24 Jul 2023 05:02) (117/75 - 121/80)  BP(mean): 89 (24 Jul 2023 05:02) (89 - 93)  RR: 18 (24 Jul 2023 05:02) (16 - 18)  SpO2: 98% (24 Jul 2023 05:02) (96% - 98%)    Parameters below as of 24 Jul 2023 05:02  Patient On (Oxygen Delivery Method): room air            PHYSICAL EXAM:  GENERAL: No apparent distress, appears stated age  EYES: Conjunctiva and sclera clear, no discharge  ENMT: Moist mucous membranes, no nasal discharge  CHEST/LUNG: Clear to auscultation bilaterally, no wheeze or rales  HEART: Regular rhythm, no rubs or gallops  ABDOMEN: Soft, Nontender, Nondistended  EXTREMITIES:  No clubbing, cyanosis but mild pedal edema  SKIN: No rash, no new discoloration      LABS:                        13.3   12.60 )-----------( 278      ( 24 Jul 2023 06:00 )             38.0     24 Jul 2023 06:00    129    |  96     |  7      ----------------------------<  123    3.8     |  24     |  0.45     Ca    7.5        24 Jul 2023 06:00  Phos  2.6       24 Jul 2023 06:00  Mg     1.8       24 Jul 2023 06:00    TPro  4.4    /  Alb  1.2    /  TBili  0.9    /  DBili  x      /  AST  20     /  ALT  33     /  AlkPhos  39     24 Jul 2023 06:00      Urinalysis Basic - ( 24 Jul 2023 06:00 )    Color: x / Appearance: x / SG: x / pH: x  Gluc: 123 mg/dL / Ketone: x  / Bili: x / Urobili: x   Blood: x / Protein: x / Nitrite: x   Leuk Esterase: x / RBC: x / WBC x   Sq Epi: x / Non Sq Epi: x / Bacteria: x      CAPILLARY BLOOD GLUCOSE      POCT Blood Glucose.: 115 mg/dL (24 Jul 2023 08:00)  POCT Blood Glucose.: 122 mg/dL (23 Jul 2023 21:03)  POCT Blood Glucose.: 139 mg/dL (23 Jul 2023 17:00)  POCT Blood Glucose.: 147 mg/dL (23 Jul 2023 12:30)        RADIOLOGY & ADDITIONAL TESTS:      Images reviewed personally    Consultant Notes Reviewed and Care Discussed with relevant Consultants.

## 2023-07-24 NOTE — PROGRESS NOTE ADULT - ASSESSMENT
48M with PMHx of T2DM presenting with hyperglycemia (not DKA), abdominal pain, and nausea. CT A&P showing possible enteritis or partial SBO.    #Enteritis vs. Partial SBO?  - Surgery following and may be diabetic in nature or infectious  - CT enterography on 7/21: "Generalized, continuous mural hyperenhancement/small bowel wall thickening compatible with nonspecific small bowel inflammation. Moderate dilated small bowel with gradual transition to normal loops distally, which may be associated with partial bowel obstruction as previously noted."  - Finished a 7 day course of Zosyn on 7/22  - Prior NG tube trial  - GI following & ASCA and ANCA sent  - GI started Mesalamine on 7/22  - f/up updated GI recs ?Mesalamine on discharge    #Hyponatremia  - f/up renal consult  - consider salt tabs  - Is/Os    #T2DM  - FS TID AC & insulin sliding scale  - A1c is 8.9 indicating he likely needs dual therapy    #Hypoalbuminemia  - ?GI vs  protein loss  - Nutrition consult      DC home pending clearance from consultants

## 2023-07-24 NOTE — CONSULT NOTE ADULT - SUBJECTIVE AND OBJECTIVE BOX
[facility]  Martinez Zhou PA-C  [address] [place] [id number] [phone]      Chief Complaint:  Patient is a 48y old  Male who presents with a chief complaint of Per H&P "Patient is a 49 yo M with PMH of IDDM who presented to ED yesterday with complaint of elevated glucose and nausea. He reported abdominal pain, fever, and an episode of vomiting. He reports he stopped his insulin 2 weeks ago as he says his sugars were under control and no longer needed it. He had a negative workup for DKA and was given oral potassium and was discharged after feeling better with instructions to resume his insulin and followup with his PCP. He returned today   returned today with reports of fever and worsening abdominal pain. Denies diarrhea. He reports he is a cigar smoker. "         Allergies:  No Known Allergies      Medications:  acetaminophen  Suppository .. 650 milliGRAM(s) Rectal every 6 hours PRN  aluminum hydroxide/magnesium hydroxide/simethicone Suspension 30 milliLiter(s) Oral every 4 hours PRN  dextrose 5%. 1000 milliLiter(s) IV Continuous <Continuous>  dextrose 5%. 1000 milliLiter(s) IV Continuous <Continuous>  dextrose 50% Injectable 25 Gram(s) IV Push once  dextrose 50% Injectable 25 Gram(s) IV Push once  dextrose 50% Injectable 12.5 Gram(s) IV Push once  dextrose Oral Gel 15 Gram(s) Oral once PRN  glucagon  Injectable 1 milliGRAM(s) IntraMuscular once  insulin lispro (ADMELOG) corrective regimen sliding scale   SubCutaneous every 6 hours  lactated ringers. 1000 milliLiter(s) IV Continuous <Continuous>  melatonin 3 milliGRAM(s) Oral at bedtime PRN  ondansetron Injectable 4 milliGRAM(s) IV Push every 8 hours PRN  pantoprazole  Injectable 40 milliGRAM(s) IV Push daily  piperacillin/tazobactam IVPB.. 3.375 Gram(s) IV Intermittent every 8 hours      PMHX/PSHX:  DM (diabetes mellitus)        Family history:      Social History:     ROS:     General:  no fevers, chills, night sweats, fatigue,   Eyes:  Good vision, no reported pain  ENT:  No sore throat, pain, runny nose, dysphagia  CV:  No pain, palpitations, hypo/hypertension  Resp:  No dyspnea, cough, tachypnea, wheezing  GI:  No pain, No nausea, No vomiting, No diarrhea, No constipation, No weight loss, No fever, No pruritis, No rectal bleeding, No tarry stools, No dysphagia,  :  No pain, bleeding, incontinence, nocturia  Muscle:  No pain, weakness  Neuro:  No weakness, tingling, memory problems  Psych:  No fatigue, insomnia, mood problems, depression  Endocrine:  No polyuria, polydipsia, cold/heat intolerance  Heme:  No petechiae, ecchymosis, easy bruisability  Skin:  No rash, tattoos, scars, edema      PHYSICAL EXAM:   Vital Signs:  Vital Signs Last 24 Hrs  T(C): 37.4 (16 Jul 2023 17:00), Max: 38.1 (15 Jul 2023 22:45)  T(F): 99.3 (16 Jul 2023 17:00), Max: 100.6 (16 Jul 2023 01:17)  HR: 78 (16 Jul 2023 17:00) (63 - 94)  BP: 106/69 (16 Jul 2023 17:00) (96/67 - 131/66)  BP(mean): --  RR: 16 (16 Jul 2023 17:00) (15 - 18)  SpO2: 97% (16 Jul 2023 17:00) (96% - 100%)    Parameters below as of 16 Jul 2023 17:00  Patient On (Oxygen Delivery Method): room air      Daily Height in cm: 177.8 (15 Jul 2023 22:45)    Daily     GENERAL:  Appears stated age,   HEENT:  NC/AT,  +ngt  CHEST:  Full & symmetric excursion,   HEART:  Regular rhythm  ABDOMEN:  Soft, non-tender, non-distended,   EXTEREMITIES:  no cyanosis,clubbing or edema  SKIN:  No rash  NEURO:  Alert,    LABS:                        12.2   4.58  )-----------( 164      ( 16 Jul 2023 06:21 )             34.8     07-16    x   |  x   |  x   ----------------------------<  x   4.5   |  x   |  x     Ca    7.4<L>      16 Jul 2023 06:21  Mg     1.8     07-16    TPro  6.5  /  Alb  2.9<L>  /  TBili  1.3<H>  /  DBili  x   /  AST  36  /  ALT  31  /  AlkPhos  34  07-15    LIVER FUNCTIONS - ( 15 Jul 2023 12:51 )  Alb: 2.9 g/dL / Pro: 6.5 g/dL / ALK PHOS: 34 U/L / ALT: 31 U/L DA / AST: 36 U/L / GGT: x             Urinalysis Basic - ( 16 Jul 2023 06:21 )    Color: x / Appearance: x / SG: x / pH: x  Gluc: 119 mg/dL / Ketone: x  / Bili: x / Urobili: x   Blood: x / Protein: x / Nitrite: x   Leuk Esterase: x / RBC: x / WBC x   Sq Epi: x / Non Sq Epi: x / Bacteria: x          Imaging:          
[facility]FECTIOUS DISEASE  Sergio Caicedo MD PhD, Holly Lafleur MD, Luli Franz MD, Js Sanders MD, Sedrick Sinha MD  and providing coverage with Stephany Lindo MD  Providing Infectious Disease Consultations at [facility], [facility], [facility], [facility], [facility]'s    Office# [phone] to schedule follow up appointments  Answering Service for urgent calls or New Consults [phone]  Cell# to text for urgent issues Sergio Sascha [phone]     HPI:  49 yo M with PMH of DM who presented to ED with complaint of elevated glucose and nausea. He reported abdominal pain, fever, and an episode of vomiting. He reports he stopped his insulin 2 weeks PTA as he says his sugars were under control and no longer needed it. He had a negative workup for DKA and was given oral potassium and was discharged after feeling better with instructions to resume his insulin and followup with his PCP. He returned with reports of fever and worsening abdominal pain. Denies diarrhea.     ED course: CBC shows bandemia increased from 18 to 25. CT showed enteritis and unable to rule out partial SBO. findings d/w Dr. Correa who advised NG tube and admission for further testing. Patient recieved zosyn and GI Dr. Schafer made aware.      PAST MEDICAL & SURGICAL HISTORY:  DM (diabetes mellitus)      Antimicrobials      Immunological      Other  acetaminophen  Suppository .. 650 milliGRAM(s) Rectal every 6 hours PRN  aluminum hydroxide/magnesium hydroxide/simethicone Suspension 30 milliLiter(s) Oral every 4 hours PRN  dextrose 5%. 1000 milliLiter(s) IV Continuous <Continuous>  dextrose 5%. 1000 milliLiter(s) IV Continuous <Continuous>  dextrose 50% Injectable 12.5 Gram(s) IV Push once  dextrose 50% Injectable 25 Gram(s) IV Push once  dextrose 50% Injectable 25 Gram(s) IV Push once  dextrose Oral Gel 15 Gram(s) Oral once PRN  glucagon  Injectable 1 milliGRAM(s) IntraMuscular once  insulin lispro (ADMELOG) corrective regimen sliding scale   SubCutaneous every 6 hours  lactated ringers. 1000 milliLiter(s) IV Continuous <Continuous>  magnesium sulfate  IVPB 2 Gram(s) IV Intermittent once  melatonin 3 milliGRAM(s) Oral at bedtime PRN  ondansetron Injectable 4 milliGRAM(s) IV Push every 8 hours PRN  pantoprazole  Injectable 40 milliGRAM(s) IV Push daily  potassium chloride  10 mEq/100 mL IVPB 10 milliEquivalent(s) IV Intermittent every 1 hour      Allergies    No Known Allergies    Intolerances        SOCIAL HISTORY:  no toxic habits reported      FAMILY HISTORY: noncontrib      ROS:    EYES:  Negative  blurry vision or double vision  GASTROINTESTINAL:  Negative for chest pain, no cough  -otherwise negative except for subjective    Vital Signs Last 24 Hrs  T(C): 36.4 (16 Jul 2023 05:17), Max: 38.1 (15 Jul 2023 12:21)  T(F): 97.5 (16 Jul 2023 05:17), Max: 100.6 (16 Jul 2023 01:17)  HR: 63 (16 Jul 2023 05:17) (63 - 100)  BP: 96/67 (16 Jul 2023 05:17) (96/56 - 107/67)  BP(mean): --  RR: 17 (16 Jul 2023 05:17) (15 - 18)  SpO2: 96% (16 Jul 2023 05:17) (96% - 100%)    Parameters below as of 16 Jul 2023 05:17  Patient On (Oxygen Delivery Method): room air        PE:  In no distress, NG tube in place  HEENT:  NC, PERRL, sclerae anicteric, conjunctivae clear, EOMI.  Sinuses nontender, no nasal exudate.  No buccal or pharyngeal lesions, erythema or exudate  Neck:  Supple, no adenopathy  Lungs:  No accessory muscle use, bilaterally clear to auscultation  Cor:  distant  Abd:  Symmetric, normoactive BS.  firm, warm, no masses, guarding or rebound.  Liver and spleen not enlarged  Extrem:  No cyanosis or edema  Skin:  No rashes.  Neuro: grossly intact  Musc: moving all limbs freely, no focal deficits        LABS:                        12.2   4.58  )-----------( 164      ( 16 Jul 2023 06:21 )             34.8     Band Neutrophils 25.0% (07.15.23 @ 15:29)    WBC Count: 4.58 K/uL (07-16-23 @ 06:21)  WBC Count: 3.45 K/uL (07-15-23 @ 15:29)  WBC Count: 4.70 K/uL (07-15-23 @ 12:51)  WBC Count: 9.67 K/uL (07-14-23 @ 18:36)      07-16    140  |  107  |  9   ----------------------------<  119<H>  3.0<L>   |  24  |  0.62    Ca    7.4<L>      16 Jul 2023 06:21  Mg     1.8     07-16    TPro  6.5  /  Alb  2.9<L>  /  TBili  1.3<H>  /  DBili  x   /  AST  36  /  ALT  31  /  AlkPhos  34  07-15      Creatinine: 0.62 mg/dL (07-16-23 @ 06:21)  Creatinine: 0.79 mg/dL (07-15-23 @ 12:51)  Creatinine: 1.11 mg/dL (07-14-23 @ 18:36)      Urinalysis Basic - ( 16 Jul 2023 06:21 )    Color: x / Appearance: x / SG: x / pH: x  Gluc: 119 mg/dL / Ketone: x  / Bili: x / Urobili: x   Blood: x / Protein: x / Nitrite: x   Leuk Esterase: x / RBC: x / WBC x   Sq Epi: x / Non Sq Epi: x / Bacteria: x      MICROBIOLOGY:      RADIOLOGY & ADDITIONAL STUDIES:    --< from: CT Abdomen and Pelvis w/ IV Cont (07.15.23 @ 13:40) >    ACC: [id number] EXAM:  CT ABDOMEN AND PELVIS IC   ORDERED BY: LLUVIA JIMENES     PROCEDURE DATE:  07/15/2023          INTERPRETATION:  CLINICAL INFORMATION: 48 years  Male with abdominal pain   fever.    COMPARISON: None.    CONTRAST/COMPLICATIONS:  IV Contrast: Omnipaque 350  90 cc administered   10 cc discarded  Oral Contrast: NONE  Complications: None reported at time of study completion    PROCEDURE:  CT of the Abdomen and Pelvis was performed.  Sagittal and coronal reformats were performed.    FINDINGS:  LOWER CHEST: Trace bilateral pleural effusions.    LIVER: Within normal limits.  BILE DUCTS: Normal caliber.  GALLBLADDER: Within normal limits.  SPLEEN: Within normal limits.  PANCREAS: Within normal limits.  ADRENALS: Within normal limits.  KIDNEYS/URETERS: Within normal limits.    BLADDER: Within normal limits.  REPRODUCTIVE ORGANS: Mildly enlarged prostate measuring 4.7 cm transverse.    BOWEL: Dilated fluid-filled small bowel loops measuring up to 3.2 cm with   gradual transition in the pelvis suggestive of enteritis. Mild mesenteric   edema in the anterior mid pelvis (3:88). Early or partial obstruction is   not excluded. Scattered colonic diverticulosis without diverticulitis.   Appendix is normal.  PERITONEUM: Small pelvic ascites.  VESSELS: Within normal limits.  RETROPERITONEUM/LYMPH NODES: No lymphadenopathy.  ABDOMINAL WALL: Within normal limits.  BONES: Within normal limits.    IMPRESSION:  Dilated small bowel with gradual transition in the pelvis suggestive of   enteritis. Recommend follow-up to exclude early or partial small bowel   obstruction. Small ascites.    Trace bilateral pleural effusions.      
[facility] NEPHR[facility], [facility]  NEPHROLOGY AND HYPERTENSION  [address]  [address]  [place] [address]  [phone]    MD ALEXUS MEIER MD YELENA ROSENBERG, MD BINNY KOSHY, MD CHRISTOPHER CAPUTO, MD EDWARD BOVER, MD      Information from chart:  "Patient is a 48y old  Male who presents with a chief complaint of abd pain (24 Jul 2023 10:54)    HPI:  Patient is a 47 yo M with PMH of IDDM who presented to ED yesterday with complaint of elevated glucose and nausea. He reported abdominal pain, fever, and an episode of vomiting. He reports he stopped his insulin 2 weeks ago as he says his sugars were under control and no longer needed it. He had a negative workup for DKA and was given oral potassium and was discharged after feeling better with instructions to resume his insulin and followup with his PCP. He returned today   returned today with reports of fever and worsening abdominal pain. Denies diarrhea. He reports he is a cigar smoker.     ED course: CBC shows bandemia increased from 18 to 25. CT showed enteritis and unable to rule out partial SBO. findings d/w Dr. Correa who advised NG tube and admission for further testing. Patient recieved zosyn and GI Dr. Schafer made aware.    Patient admits to continued poor po intake;   Postprandial bloating     PMH: IDDM  PSH: denies abdominal surgeries  Ax: NKDA  PCP: Denae Hernandez     (15 Jul 2023 18:33)   "    PAST MEDICAL & SURGICAL HISTORY:  DM (diabetes mellitus)        FAMILY HISTORY:    Allergies    No Known Allergies    Intolerances      Home Medications:    MEDICATIONS  (STANDING):  dextrose 5%. 1000 milliLiter(s) (50 mL/Hr) IV Continuous <Continuous>  dextrose 5%. 1000 milliLiter(s) (100 mL/Hr) IV Continuous <Continuous>  dextrose 50% Injectable 25 Gram(s) IV Push once  dextrose 50% Injectable 12.5 Gram(s) IV Push once  dextrose 50% Injectable 25 Gram(s) IV Push once  glucagon  Injectable 1 milliGRAM(s) IntraMuscular once  glucagon  Injectable 1 milliGRAM(s) IntraMuscular once  insulin lispro (ADMELOG) corrective regimen sliding scale   SubCutaneous three times a day before meals  insulin lispro (ADMELOG) corrective regimen sliding scale   SubCutaneous at bedtime  insulin lispro (ADMELOG) corrective regimen sliding scale   SubCutaneous every 6 hours  mesalamine DR Capsule 1200 milliGRAM(s) Oral three times a day  pantoprazole  Injectable 40 milliGRAM(s) IV Push daily  simethicone 80 milliGRAM(s) Chew three times a day  sodium chloride 1 Gram(s) Oral two times a day    MEDICATIONS  (PRN):  acetaminophen     Tablet .. 650 milliGRAM(s) Oral every 6 hours PRN Temp greater or equal to 38C (100.4F), Mild Pain (1 - 3), Moderate Pain (4 - 6), Severe Pain (7 - 10)  dextrose Oral Gel 15 Gram(s) Oral once PRN Blood Glucose LESS THAN 70 milliGRAM(s)/deciliter  melatonin 3 milliGRAM(s) Oral at bedtime PRN Insomnia  ondansetron Injectable 4 milliGRAM(s) IV Push every 8 hours PRN Nausea and/or Vomiting    Vital Signs Last 24 Hrs  T(C): 37.2 (24 Jul 2023 05:02), Max: 37.2 (24 Jul 2023 05:02)  T(F): 98.9 (24 Jul 2023 05:02), Max: 98.9 (24 Jul 2023 05:02)  HR: 83 (24 Jul 2023 05:02) (83 - 93)  BP: 117/75 (24 Jul 2023 05:02) (117/75 - 121/80)  BP(mean): 89 (24 Jul 2023 05:02) (89 - 93)  RR: 18 (24 Jul 2023 05:02) (16 - 18)  SpO2: 98% (24 Jul 2023 05:02) (96% - 98%)    Parameters below as of 24 Jul 2023 05:02  Patient On (Oxygen Delivery Method): room air        Daily     Daily     CAPILLARY BLOOD GLUCOSE      POCT Blood Glucose.: 115 mg/dL (24 Jul 2023 08:00)  POCT Blood Glucose.: 122 mg/dL (23 Jul 2023 21:03)  POCT Blood Glucose.: 139 mg/dL (23 Jul 2023 17:00)  POCT Blood Glucose.: 147 mg/dL (23 Jul 2023 12:30)    PHYSICAL EXAM:      T(C): 37.2 (07-24-23 @ 05:02), Max: 37.2 (07-24-23 @ 05:02)  HR: 83 (07-24-23 @ 05:02) (83 - 93)  BP: 117/75 (07-24-23 @ 05:02) (117/75 - 121/80)  RR: 18 (07-24-23 @ 05:02) (16 - 18)  SpO2: 98% (07-24-23 @ 05:02) (96% - 98%)  Wt(kg): --  Lungs clear  Heart S1S2  Abd soft NT ND diminished BS  Extremities:   tr edema              07-24    129<L>  |  96  |  7   ----------------------------<  123<H>  3.8   |  24  |  0.45<L>    Ca    7.5<L>      24 Jul 2023 06:00  Phos  2.6     07-24  Mg     1.8     07-24    TPro  4.4<L>  /  Alb  1.2<L>  /  TBili  0.9  /  DBili  x   /  AST  20  /  ALT  33  /  AlkPhos  39  07-24                          13.3   12.60 )-----------( 278      ( 24 Jul 2023 06:00 )             38.0     Creatinine Trend: 0.45<--, 0.47<--, 0.48<--, 0.46<--, 0.54<--, 0.59<--  Urinalysis Basic - ( 24 Jul 2023 06:00 )    Color: x / Appearance: x / SG: x / pH: x  Gluc: 123 mg/dL / Ketone: x  / Bili: x / Urobili: x   Blood: x / Protein: x / Nitrite: x   Leuk Esterase: x / RBC: x / WBC x   Sq Epi: x / Non Sq Epi: x / Bacteria: x      Trend Sodium  07-24-23 @ 06:00   -  129<L>  07-23-23 @ 06:40   -  134<L>  07-22-23 @ 06:39   -  131<L>    < from: CT Enterography w/ Oral Cont and w/ IV Cont (07.21.23 @ 11:39) >    IMPRESSION:    Generalized, continuous mural hyperenhancement/small bowel wall   thickening compatible with nonspecific small bowel inflammation.    Moderate dilated small bowel with gradual transition to normal loops   distally, which may be associated with partial bowel obstruction as   previously noted.    < end of copied text >      Assessment   Chronic, suspected hypovolemic hyponatremia;   Elevated ADH state  Poor po intake hypokalemia and hypophosphatemia related   Will r/o endocrinological contributors     Plan  Continue isotonic IVF with KCL for now   TFTS and AM cortisol   Urine osm   Further recommendations pending course    Fredi Cueto MD
                                                                                            General Surgery Consult    HPI:  48M presenting with abdominal pain, nausea and vomiting. Patient was in ED yesterday and worked up for DKA, discharged home. Presents again today for worsening symptoms. Patient also reports associated diarrhea. Currently with NGT in place denies any further emesis since presentation. Of note, patient febrile on admission to ED.  No prior abdominal surgery    PAST MEDICAL & SURGICAL HISTORY:  PAST MEDICAL & SURGICAL HISTORY:  DM (diabetes mellitus)    FAMILY HISTORY:   FAMILY HISTORY:    MEDICATIONS:    ALLERGIES:  No Known Allergies    VITALS & I/Os:  Vital Signs Last 24 Hrs  T(C): 37.3 (15 Jul 2023 16:39), Max: 38.1 (15 Jul 2023 12:21)  T(F): 99.2 (15 Jul 2023 16:39), Max: 100.5 (15 Jul 2023 12:21)  HR: 82 (15 Jul 2023 16:39) (82 - 102)  BP: 96/56 (15 Jul 2023 16:39) (96/56 - 118/87)  BP(mean): --  RR: 16 (15 Jul 2023 16:39) (16 - 20)  SpO2: 97% (15 Jul 2023 16:39) (97% - 99%)    Parameters below as of 15 Jul 2023 12:21  Patient On (Oxygen Delivery Method): room air        I&O's Summary      PHYSICAL EXAM:  Constitutional: No acute distress, resting comfortably  Neuro: Awake and alert  Psych: Calm, affect appropriate  HEENT: Anicteric, no conjunctival injection. NGT in place with bilious drainage  Respiratory: Nonlabored, equal chest rise  Cardiovascular: Regular rate and rhythm  GI: Soft, distended, mildly tender without rebound or guarding  Musculoskeletal: Warm, no edema, no obvious deformity    LABS:                        12.4   3.45  )-----------( 156      ( 15 Jul 2023 15:29 )             35.3     07-15    137  |  101  |  16  ----------------------------<  188<H>  3.4<L>   |  23  |  0.79    Ca    8.5      15 Jul 2023 12:51  Mg     2.1     07-14    TPro  6.5  /  Alb  2.9<L>  /  TBili  1.3<H>  /  DBili  x   /  AST  36  /  ALT  31  /  AlkPhos  34  07-15    Lactate: Lactate, Blood: 1.6 mmol/L (07-15 @ 14:13)  Lactate, Blood: 2.4 mmol/L (07-15 @ 12:51)       ABG - ( 2023 18:53 )  pH, Arterial: 7.46  pH, Blood: x     /  pCO2: 29    /  pO2: 51    / HCO3: 21    / Base Excess: -3.2  /  SaO2: 86.2      Urinalysis Basic - ( 15 Jul 2023 15:32 )    Color: Yellow / Appearance: Clear / S.081 / pH: x  Gluc: x / Ketone: 80 mg/dL  / Bili: Negative / Urobili: 1.0 mg/dL   Blood: x / Protein: 30 mg/dL / Nitrite: Negative   Leuk Esterase: Negative / RBC: 1 /HPF / WBC 1 /HPF   Sq Epi: x / Non Sq Epi: x / Bacteria: Occasional /HPF        IMAGING:  < from: CT Abdomen and Pelvis w/ IV Cont (07.15.23 @ 13:40) >    ACC: [id number] EXAM:  CT ABDOMEN AND PELVIS IC   ORDERED BY: LLUVIA JIMENES     PROCEDURE DATE:  07/15/2023          INTERPRETATION:  CLINICAL INFORMATION: 48 years  Male with abdominal pain   fever.    COMPARISON: None.    CONTRAST/COMPLICATIONS:  IV Contrast: Omnipaque 350  90 cc administered   10 cc discarded  Oral Contrast: NONE  Complications: None reported at time of study completion    PROCEDURE:  CT of the Abdomen and Pelvis was performed.  Sagittal and coronal reformats were performed.    FINDINGS:  LOWER CHEST: Trace bilateral pleural effusions.    LIVER: Within normal limits.  BILE DUCTS: Normal caliber.  GALLBLADDER: Within normal limits.  SPLEEN: Within normal limits.  PANCREAS: Within normal limits.  ADRENALS: Within normal limits.  KIDNEYS/URETERS: Within normal limits.    BLADDER: Within normal limits.  REPRODUCTIVE ORGANS: Mildly enlarged prostate measuring 4.7 cm transverse.    BOWEL: Dilated fluid-filled small bowel loops measuring up to 3.2 cm with   gradual transition in the pelvis suggestive of enteritis. Mild mesenteric   edema in the anterior mid pelvis (3:88). Early or partial obstruction is   not excluded. Scattered colonic diverticulosis without diverticulitis.   Appendix is normal.  PERITONEUM: Small pelvic ascites.  VESSELS: Within normal limits.  RETROPERITONEUM/LYMPH NODES: No lymphadenopathy.  ABDOMINAL WALL: Within normal limits.  BONES: Within normal limits.    IMPRESSION:  Dilated small bowel with gradual transition in the pelvis suggestive of   enteritis. Recommend follow-up to exclude early or partial small bowel   obstruction. Small ascites.    Trace bilateral pleural effusions.        --- End of Report ---            ISAIAH NATARAJAN MD; Attending Radiologist  This document has been electronically signed. Jul 15 2023  2:23PM    < end of copied text >  
Patient is a 48y old  Male who presents with a chief complaint of Enteritis (17 Jul 2023 10:46)    Type 2 DM DX 3 years ago, no known complications or hx DKA/HSS. managed by PCP Dr. Mary Lambert, current A1c 8.9%. reports Rx home 10 units basal insulin 4 units rapid acting insulin premeal. reports has not been checking f/s recently, states has testing supplies and insulin @ home. reinforced importance of BGM frequency when using insulin and trending BG, denies S/S of hypoglycemia, reviewed S/S and management. discussed options for using CGM vs F/S, pt states he will talk with his PCP. F/S in range inpatient, pt has been on clear liquid diet, reviewed CC diet and carb identification/portion control. discussed importance of 150min/week moderate intensity exercise. pt states no needs, verbal education provided  diabetes education provided- A1c measure and BG targets  fasting, <180 2 hours postmeal. medication MOA and considerations/side effects, inhospital BGM frequency and insulin administration, s/s of hyperglycemia/hypoglycemia and management, glycemic control and preventing complications, consistent carb diet, balanced plate method, consistent meal planning. sick day management, provider f/u      HPI:  Patient is a 49 yo M with PMH of IDDM who presented to ED yesterday with complaint of elevated glucose and nausea. He reported abdominal pain, fever, and an episode of vomiting. He reports he stopped his insulin 2 weeks ago as he says his sugars were under control and no longer needed it. He had a negative workup for DKA and was given oral potassium and was discharged after feeling better with instructions to resume his insulin and followup with his PCP. He returned today   returned today with reports of fever and worsening abdominal pain. Denies diarrhea. He reports he is a cigar smoker.     ED course: CBC shows bandemia increased from 18 to 25. CT showed enteritis and unable to rule out partial SBO. findings d/w Dr. Correa who advised NG tube and admission for further testing. Patient recieved zodaven and GI Dr. Schafer made aware.        PMH: IDDM  PSH: denies abdominal surgeries  Ax: NKDA  PCP: Denae Hernandez     (15 Jul 2023 18:33)      PAST MEDICAL & SURGICAL HISTORY:  DM (diabetes mellitus)        Allergies    No Known Allergies    Intolerances        MEDICATIONS  (STANDING):  dextrose 5%. 1000 milliLiter(s) (100 mL/Hr) IV Continuous <Continuous>  dextrose 5%. 1000 milliLiter(s) (50 mL/Hr) IV Continuous <Continuous>  dextrose 50% Injectable 12.5 Gram(s) IV Push once  dextrose 50% Injectable 25 Gram(s) IV Push once  dextrose 50% Injectable 25 Gram(s) IV Push once  glucagon  Injectable 1 milliGRAM(s) IntraMuscular once  glucagon  Injectable 1 milliGRAM(s) IntraMuscular once  insulin lispro (ADMELOG) corrective regimen sliding scale   SubCutaneous at bedtime  insulin lispro (ADMELOG) corrective regimen sliding scale   SubCutaneous three times a day before meals  lactated ringers. 1000 milliLiter(s) (120 mL/Hr) IV Continuous <Continuous>  pantoprazole  Injectable 40 milliGRAM(s) IV Push daily  piperacillin/tazobactam IVPB.. 3.375 Gram(s) IV Intermittent every 8 hours

## 2023-07-25 LAB
ALBUMIN SERPL ELPH-MCNC: 1.2 G/DL — LOW (ref 3.3–5)
ALP SERPL-CCNC: 48 U/L — SIGNIFICANT CHANGE UP (ref 30–120)
ALT FLD-CCNC: 38 U/L DA — SIGNIFICANT CHANGE UP (ref 10–60)
ANION GAP SERPL CALC-SCNC: 6 MMOL/L — SIGNIFICANT CHANGE UP (ref 5–17)
APTT BLD: 33 SEC — SIGNIFICANT CHANGE UP (ref 27.5–35.5)
AST SERPL-CCNC: 21 U/L — SIGNIFICANT CHANGE UP (ref 10–40)
BASOPHILS # BLD AUTO: 0 K/UL — SIGNIFICANT CHANGE UP (ref 0–0.2)
BASOPHILS NFR BLD AUTO: 0 % — SIGNIFICANT CHANGE UP (ref 0–2)
BILIRUB SERPL-MCNC: 0.7 MG/DL — SIGNIFICANT CHANGE UP (ref 0.2–1.2)
BUN SERPL-MCNC: 8 MG/DL — SIGNIFICANT CHANGE UP (ref 7–23)
CALCIUM SERPL-MCNC: 7.3 MG/DL — LOW (ref 8.4–10.5)
CHLORIDE SERPL-SCNC: 97 MMOL/L — SIGNIFICANT CHANGE UP (ref 96–108)
CO2 SERPL-SCNC: 25 MMOL/L — SIGNIFICANT CHANGE UP (ref 22–31)
CORTIS AM PEAK SERPL-MCNC: 14.5 UG/DL — SIGNIFICANT CHANGE UP (ref 6–18.4)
CREAT SERPL-MCNC: 0.36 MG/DL — LOW (ref 0.5–1.3)
EGFR: 138 ML/MIN/1.73M2 — SIGNIFICANT CHANGE UP
EOSINOPHIL # BLD AUTO: 0 K/UL — SIGNIFICANT CHANGE UP (ref 0–0.5)
EOSINOPHIL NFR BLD AUTO: 0 % — SIGNIFICANT CHANGE UP (ref 0–6)
GLUCOSE BLDC GLUCOMTR-MCNC: 137 MG/DL — HIGH (ref 70–99)
GLUCOSE BLDC GLUCOMTR-MCNC: 157 MG/DL — HIGH (ref 70–99)
GLUCOSE BLDC GLUCOMTR-MCNC: 166 MG/DL — HIGH (ref 70–99)
GLUCOSE BLDC GLUCOMTR-MCNC: 190 MG/DL — HIGH (ref 70–99)
GLUCOSE SERPL-MCNC: 175 MG/DL — HIGH (ref 70–99)
HCT VFR BLD CALC: 37.1 % — LOW (ref 39–50)
HGB BLD-MCNC: 13.1 G/DL — SIGNIFICANT CHANGE UP (ref 13–17)
INR BLD: 1.48 RATIO — HIGH (ref 0.88–1.16)
LYMPHOCYTES # BLD AUTO: 1.92 K/UL — SIGNIFICANT CHANGE UP (ref 1–3.3)
LYMPHOCYTES # BLD AUTO: 16 % — SIGNIFICANT CHANGE UP (ref 13–44)
LYMPHOCYTES # SPEC AUTO: 2 % — HIGH (ref 0–0)
MAGNESIUM SERPL-MCNC: 1.8 MG/DL — SIGNIFICANT CHANGE UP (ref 1.6–2.6)
MANUAL SMEAR VERIFICATION: SIGNIFICANT CHANGE UP
MCHC RBC-ENTMCNC: 30 PG — SIGNIFICANT CHANGE UP (ref 27–34)
MCHC RBC-ENTMCNC: 35.3 GM/DL — SIGNIFICANT CHANGE UP (ref 32–36)
MCV RBC AUTO: 84.9 FL — SIGNIFICANT CHANGE UP (ref 80–100)
METAMYELOCYTES # FLD: 2 % — HIGH (ref 0–0)
MONOCYTES # BLD AUTO: 0.84 K/UL — SIGNIFICANT CHANGE UP (ref 0–0.9)
MONOCYTES NFR BLD AUTO: 7 % — SIGNIFICANT CHANGE UP (ref 2–14)
NEUTROPHILS # BLD AUTO: 8.75 K/UL — HIGH (ref 1.8–7.4)
NEUTROPHILS NFR BLD AUTO: 69 % — SIGNIFICANT CHANGE UP (ref 43–77)
NEUTS BAND # BLD: 4 % — SIGNIFICANT CHANGE UP (ref 0–8)
NRBC # BLD: 0 — SIGNIFICANT CHANGE UP
NRBC # BLD: SIGNIFICANT CHANGE UP /100 WBCS (ref 0–0)
OSMOLALITY SERPL: 273 MOSMOL/KG — LOW (ref 275–300)
OSMOLALITY UR: 733 MOSM/KG — SIGNIFICANT CHANGE UP (ref 50–1200)
PHOSPHATE SERPL-MCNC: 2.4 MG/DL — LOW (ref 2.5–4.5)
PLAT MORPH BLD: NORMAL — SIGNIFICANT CHANGE UP
PLATELET # BLD AUTO: 291 K/UL — SIGNIFICANT CHANGE UP (ref 150–400)
POTASSIUM SERPL-MCNC: 4.3 MMOL/L — SIGNIFICANT CHANGE UP (ref 3.5–5.3)
POTASSIUM SERPL-SCNC: 4.3 MMOL/L — SIGNIFICANT CHANGE UP (ref 3.5–5.3)
PROCALCITONIN SERPL-MCNC: 0.17 NG/ML — HIGH (ref 0.02–0.1)
PROT SERPL-MCNC: 4 G/DL — LOW (ref 6–8.3)
PROTHROM AB SERPL-ACNC: 17.5 SEC — HIGH (ref 10.5–13.4)
RBC # BLD: 4.37 M/UL — SIGNIFICANT CHANGE UP (ref 4.2–5.8)
RBC # FLD: 12.5 % — SIGNIFICANT CHANGE UP (ref 10.3–14.5)
RBC BLD AUTO: NORMAL — SIGNIFICANT CHANGE UP
SODIUM SERPL-SCNC: 128 MMOL/L — LOW (ref 135–145)
SODIUM UR-SCNC: 92 MMOL/L — SIGNIFICANT CHANGE UP
T3 SERPL-MCNC: 92 NG/DL — SIGNIFICANT CHANGE UP (ref 80–200)
T4 AB SER-ACNC: 5.1 UG/DL — SIGNIFICANT CHANGE UP (ref 4.6–12)
TSH SERPL-MCNC: 3.19 UIU/ML — SIGNIFICANT CHANGE UP (ref 0.27–4.2)
URATE SERPL-MCNC: 2 MG/DL — LOW (ref 3.4–8.8)
WBC # BLD: 11.98 K/UL — HIGH (ref 3.8–10.5)
WBC # FLD AUTO: 11.98 K/UL — HIGH (ref 3.8–10.5)

## 2023-07-25 PROCEDURE — 99232 SBSQ HOSP IP/OBS MODERATE 35: CPT

## 2023-07-25 RX ORDER — SODIUM CHLORIDE 9 MG/ML
2 INJECTION INTRAMUSCULAR; INTRAVENOUS; SUBCUTANEOUS EVERY 6 HOURS
Refills: 0 | Status: COMPLETED | OUTPATIENT
Start: 2023-07-25 | End: 2023-07-25

## 2023-07-25 RX ADMIN — SIMETHICONE 80 MILLIGRAM(S): 80 TABLET, CHEWABLE ORAL at 21:09

## 2023-07-25 RX ADMIN — Medication 1200 MILLIGRAM(S): at 06:31

## 2023-07-25 RX ADMIN — SIMETHICONE 80 MILLIGRAM(S): 80 TABLET, CHEWABLE ORAL at 06:31

## 2023-07-25 RX ADMIN — SODIUM CHLORIDE 1 GRAM(S): 9 INJECTION INTRAMUSCULAR; INTRAVENOUS; SUBCUTANEOUS at 06:31

## 2023-07-25 RX ADMIN — SODIUM CHLORIDE 2 GRAM(S): 9 INJECTION INTRAMUSCULAR; INTRAVENOUS; SUBCUTANEOUS at 13:06

## 2023-07-25 RX ADMIN — Medication 1200 MILLIGRAM(S): at 13:06

## 2023-07-25 RX ADMIN — Medication 2: at 17:30

## 2023-07-25 RX ADMIN — SODIUM CHLORIDE 2 GRAM(S): 9 INJECTION INTRAMUSCULAR; INTRAVENOUS; SUBCUTANEOUS at 17:30

## 2023-07-25 RX ADMIN — DEXTROSE MONOHYDRATE, SODIUM CHLORIDE, AND POTASSIUM CHLORIDE 100 MILLILITER(S): 50; .745; 4.5 INJECTION, SOLUTION INTRAVENOUS at 06:54

## 2023-07-25 RX ADMIN — Medication 1200 MILLIGRAM(S): at 21:09

## 2023-07-25 RX ADMIN — SIMETHICONE 80 MILLIGRAM(S): 80 TABLET, CHEWABLE ORAL at 13:09

## 2023-07-25 RX ADMIN — Medication 2: at 13:04

## 2023-07-25 RX ADMIN — Medication 83.33 MILLIMOLE(S): at 13:06

## 2023-07-25 RX ADMIN — Medication 2: at 08:48

## 2023-07-25 RX ADMIN — PANTOPRAZOLE SODIUM 40 MILLIGRAM(S): 20 TABLET, DELAYED RELEASE ORAL at 13:06

## 2023-07-25 NOTE — PROGRESS NOTE ADULT - SUBJECTIVE AND OBJECTIVE BOX
Patient is a 48y old  Male who presents with a chief complaint of abd pain (24 Jul 2023 10:54)    Patient seen in follow up for hyponatremia.        PAST MEDICAL HISTORY:  DM (diabetes mellitus)      MEDICATIONS  (STANDING):  dextrose 5%. 1000 milliLiter(s) (100 mL/Hr) IV Continuous <Continuous>  dextrose 5%. 1000 milliLiter(s) (50 mL/Hr) IV Continuous <Continuous>  dextrose 50% Injectable 25 Gram(s) IV Push once  dextrose 50% Injectable 12.5 Gram(s) IV Push once  dextrose 50% Injectable 25 Gram(s) IV Push once  glucagon  Injectable 1 milliGRAM(s) IntraMuscular once  glucagon  Injectable 1 milliGRAM(s) IntraMuscular once  insulin lispro (ADMELOG) corrective regimen sliding scale   SubCutaneous three times a day before meals  insulin lispro (ADMELOG) corrective regimen sliding scale   SubCutaneous at bedtime  insulin lispro (ADMELOG) corrective regimen sliding scale   SubCutaneous every 6 hours  mesalamine DR Capsule 1200 milliGRAM(s) Oral three times a day  pantoprazole  Injectable 40 milliGRAM(s) IV Push daily  simethicone 80 milliGRAM(s) Chew three times a day  sodium chloride 1 Gram(s) Oral two times a day  sodium phosphate 15 milliMole(s)/250 mL IVPB 15 milliMole(s) IV Intermittent once    MEDICATIONS  (PRN):  acetaminophen     Tablet .. 650 milliGRAM(s) Oral every 6 hours PRN Temp greater or equal to 38C (100.4F), Mild Pain (1 - 3), Moderate Pain (4 - 6), Severe Pain (7 - 10)  dextrose Oral Gel 15 Gram(s) Oral once PRN Blood Glucose LESS THAN 70 milliGRAM(s)/deciliter  melatonin 3 milliGRAM(s) Oral at bedtime PRN Insomnia  ondansetron Injectable 4 milliGRAM(s) IV Push every 8 hours PRN Nausea and/or Vomiting    T(C): 36.9 (07-25-23 @ 05:29), Max: 37.2 (07-24-23 @ 05:02)  HR: 86 (07-25-23 @ 05:29) (83 - 91)  BP: 105/71 (07-25-23 @ 05:29) (105/71 - 132/81)  RR: 17 (07-25-23 @ 05:29) (17 - 18)  SpO2: 98% (07-25-23 @ 05:29) (95% - 98%)  Wt(kg): --  I&O's Detail    24 Jul 2023 07:01  -  25 Jul 2023 07:00  --------------------------------------------------------  IN:    dextrose 5% + sodium chloride 0.9% w/ Additives: 600 mL  Total IN: 600 mL    OUT:    Voided (mL): 200 mL  Total OUT: 200 mL    Total NET: 400 mL          PHYSICAL EXAM:  General: No distress  Respiratory: b/l air entry  Cardiovascular: S1 S2  Gastrointestinal: soft  Extremities:  edema                              13.1   11.98 )-----------( 291      ( 25 Jul 2023 08:02 )             37.1     07-25    128<L>  |  97  |  8   ----------------------------<  175<H>  4.3   |  25  |  0.36<L>    Ca    7.3<L>      25 Jul 2023 08:02  Phos  2.4     07-25  Mg     1.8     07-25    TPro  4.0<L>  /  Alb  1.2<L>  /  TBili  0.7  /  DBili  x   /  AST  21  /  ALT  38  /  AlkPhos  48  07-25        LIVER FUNCTIONS - ( 25 Jul 2023 08:02 )  Alb: 1.2 g/dL / Pro: 4.0 g/dL / ALK PHOS: 48 U/L / ALT: 38 U/L DA / AST: 21 U/L / GGT: x           Urinalysis Basic - ( 25 Jul 2023 08:02 )    Color: x / Appearance: x / SG: x / pH: x  Gluc: 175 mg/dL / Ketone: x  / Bili: x / Urobili: x   Blood: x / Protein: x / Nitrite: x   Leuk Esterase: x / RBC: x / WBC x   Sq Epi: x / Non Sq Epi: x / Bacteria: x        Sodium, Serum: 128 (07-25 @ 08:02)  Sodium, Serum: 129 (07-24 @ 06:00)  Sodium, Serum: 134 (07-23 @ 06:40)  Sodium, Serum: 131 (07-22 @ 06:39)    Creatinine, Serum: 0.36 (07-25 @ 08:02)  Creatinine, Serum: 0.45 (07-24 @ 06:00)  Creatinine, Serum: 0.47 (07-23 @ 06:40)  Creatinine, Serum: 0.48 (07-22 @ 06:39)    Potassium, Serum: 4.3 (07-25 @ 08:02)  Potassium, Serum: 3.8 (07-24 @ 06:00)  Potassium, Serum: 3.7 (07-23 @ 06:40)  Potassium, Serum: 3.7 (07-22 @ 06:39)    Hemoglobin: 13.1 (07-25 @ 08:02)  Hemoglobin: 13.3 (07-24 @ 06:00)  Hemoglobin: 12.9 (07-23 @ 06:40)  Hemoglobin: 12.6 (07-22 @ 06:39)

## 2023-07-25 NOTE — PROGRESS NOTE ADULT - ASSESSMENT
48M with PMHx of T2DM presenting with hyperglycemia (not DKA), abdominal pain, and nausea. CT A&P showing possible enteritis or partial SBO.    #Enteritis vs. Partial SBO?  - Surgery following and may be diabetic in nature or infectious  - CT enterography on 7/21: "Generalized, continuous mural hyperenhancement/small bowel wall thickening compatible with nonspecific small bowel inflammation. Moderate dilated small bowel with gradual transition to normal loops distally, which may be associated with partial bowel obstruction as previously noted."  - Finished a 7 day course of Zosyn on 7/22  - Prior NG tube trial  - GI following & ASCA and ANCA sent  - GI started Mesalamine on 7/22  - f/up updated GI recs - 2-week Mesalamine on discharge    #Hyponatremia  - f/up renal consult  - salt tabs  - Is/Os    #T2DM  - FS TID AC & insulin sliding scale  - A1c is 8.9 indicating he likely needs dual therapy    #Hypoalbuminemia  - ?GI vs  protein loss  - Nutrition consulted      DC home pending improvement in sodium

## 2023-07-25 NOTE — PROGRESS NOTE ADULT - SUBJECTIVE AND OBJECTIVE BOX
[facility], Division of Infectious Diseases  [facility], [facility], [facility]  [phone]    Name: ANGEL RAMAN  Age: 48y  Gender: Male  MRN: [id number]    Interval History:  Patient seen and examined at bedside  Afebrile, feeling better  No acute overnight events.   Notes reviewed    Antibiotics:      Medications:  acetaminophen     Tablet .. 650 milliGRAM(s) Oral every 6 hours PRN  dextrose 5%. 1000 milliLiter(s) IV Continuous <Continuous>  dextrose 5%. 1000 milliLiter(s) IV Continuous <Continuous>  dextrose 50% Injectable 25 Gram(s) IV Push once  dextrose 50% Injectable 12.5 Gram(s) IV Push once  dextrose 50% Injectable 25 Gram(s) IV Push once  dextrose Oral Gel 15 Gram(s) Oral once PRN  glucagon  Injectable 1 milliGRAM(s) IntraMuscular once  glucagon  Injectable 1 milliGRAM(s) IntraMuscular once  insulin lispro (ADMELOG) corrective regimen sliding scale   SubCutaneous three times a day before meals  insulin lispro (ADMELOG) corrective regimen sliding scale   SubCutaneous at bedtime  insulin lispro (ADMELOG) corrective regimen sliding scale   SubCutaneous every 6 hours  melatonin 3 milliGRAM(s) Oral at bedtime PRN  mesalamine DR Capsule 1200 milliGRAM(s) Oral three times a day  ondansetron Injectable 4 milliGRAM(s) IV Push every 8 hours PRN  pantoprazole  Injectable 40 milliGRAM(s) IV Push daily  simethicone 80 milliGRAM(s) Chew three times a day      Review of Systems:  A 10-point review of systems was obtained.     Pertinent positives and negatives--  Constitutional: No fevers. No Chills. No Rigors.   Cardiovascular: No chest pain. No palpitations.  Respiratory: No shortness of breath. No cough.  Gastrointestinal: No nausea or vomiting. No diarrhea or constipation.   Psychiatric: Pleasant. Appropriate affect.    Review of systems otherwise negative except as previously noted.    Allergies: No Known Allergies    For details regarding the patient's past medical history, social history, family history, and other miscellaneous elements, please refer the initial infectious diseases consultation and/or the admitting history and physical examination for this admission.    Objective:  Vital Signs Last 24 Hrs  T(C): 36.9 (25 Jul 2023 05:29), Max: 36.9 (24 Jul 2023 13:00)  T(F): 98.5 (25 Jul 2023 05:29), Max: 98.5 (24 Jul 2023 13:00)  HR: 86 (25 Jul 2023 05:29) (86 - 91)  BP: 105/71 (25 Jul 2023 05:29) (105/71 - 132/81)  BP(mean): --  RR: 17 (25 Jul 2023 05:29) (17 - 18)  SpO2: 98% (25 Jul 2023 05:29) (95% - 98%)    Parameters below as of 25 Jul 2023 05:29  Patient On (Oxygen Delivery Method): room air      Physical Examination:  General: no acute distress  HEENT: NC/AT, EOMI  Cardio: S1, S2 heard, RRR, no murmurs  Resp: breath sounds heard bilaterally, no rales, wheezes or rhonchi  Abd:  distended  Ext: no edema or cyanosis  Skin: warm, dry, no visible rash      Laboratory Studies:                        13.1   11.98 )-----------( 291      ( 25 Jul 2023 08:02 )             37.1   07-25    128<L>  |  97  |  8   ----------------------------<  175<H>  4.3   |  25  |  0.36<L>    Ca    7.3<L>      25 Jul 2023 08:02  Phos  2.4     07-25  Mg     1.8     07-25    TPro  4.0<L>  /  Alb  1.2<L>  /  TBili  0.7  /  DBili  x   /  AST  21  /  ALT  38  /  AlkPhos  48  07-25          Urinalysis Basic - ( 24 Jul 2023 06:00 )    Color: x / Appearance: x / SG: x / pH: x  Gluc: 123 mg/dL / Ketone: x  / Bili: x / Urobili: x   Blood: x / Protein: x / Nitrite: x   Leuk Esterase: x / RBC: x / WBC x   Sq Epi: x / Non Sq Epi: x / Bacteria: x        Microbiology: reviewed    Culture - Blood (collected 07-20-23 @ 16:37)  Source: .Blood Blood-Peripheral  Preliminary Report (07-24-23 @ 01:01):    No growth at 72 Hours    Culture - Blood (collected 07-20-23 @ 16:37)  Source: .Blood Blood-Peripheral  Preliminary Report (07-24-23 @ 01:01):    No growth at 72 Hours    Culture - Blood (collected 07-17-23 @ 18:41)  Source: .Blood Blood-Venous  Final Report (07-23-23 @ 01:01):    No growth at 5 days    Culture - Blood (collected 07-17-23 @ 18:00)  Source: .Blood Blood-Venous  Final Report (07-23-23 @ 01:01):    No growth at 5 days    Culture - Urine (collected 07-15-23 @ 15:37)  Source: Clean Catch Clean Catch (Midstream)  Final Report (07-16-23 @ 23:37):    <10,000 CFU/mL Normal Urogenital Swati    Culture - Blood (collected 07-15-23 @ 12:59)  Source: .Blood Blood-Peripheral  Final Report (07-20-23 @ 23:00):    No growth at 5 days    Culture - Blood (collected 07-15-23 @ 12:52)  Source: .Blood Blood-Peripheral  Final Report (07-20-23 @ 23:00):    No growth at 5 days          Radiology: reviewed

## 2023-07-25 NOTE — PROGRESS NOTE ADULT - ASSESSMENT
Hyponatremia: SIADH, low solute intake  SBO, Enteritis  Diabetes  Hypophosphatemia    Avoid excessive PO fluids. PO salt tabs. D/c IVF. Check bladder scan. Monitor blood sugar levels. Insulin coverage as needed.   Phosphorous supplementation. Will follow electrolytes and renal function trend. GI follow up.

## 2023-07-25 NOTE — CASE MANAGEMENT PROGRESS NOTE - NSCMPROGRESSNOTE_GEN_ALL_CORE
Per treatment team rounds pt. not cleared for next level of care.  Pt. was seen by DM NP Educator this admission. Per MD notes Na low 128, Nephro following as well as GI and Surgery.  Per primary nurse no skille home care needs noted at this time.  CM remains available.      Per treatment team rounds pt. not cleared for next level of care.  Pt. was seen by DM NP Educator this admission. Per MD notes Na low 128, Nephro following as well as GI and Surgery.  Per primary nurse no skilled home care needs noted at this time.  CM remains available.

## 2023-07-25 NOTE — PROGRESS NOTE ADULT - SUBJECTIVE AND OBJECTIVE BOX
Patient is a 48y old  Male who presents with a chief complaint of abd pain (24 Jul 2023 10:54)      INTERVAL History of Present Illness/OVERNIGHT EVENTS: advised increased salt intake    MEDICATIONS  (STANDING):  dextrose 5%. 1000 milliLiter(s) (100 mL/Hr) IV Continuous <Continuous>  dextrose 5%. 1000 milliLiter(s) (50 mL/Hr) IV Continuous <Continuous>  dextrose 50% Injectable 25 Gram(s) IV Push once  dextrose 50% Injectable 12.5 Gram(s) IV Push once  dextrose 50% Injectable 25 Gram(s) IV Push once  glucagon  Injectable 1 milliGRAM(s) IntraMuscular once  glucagon  Injectable 1 milliGRAM(s) IntraMuscular once  insulin lispro (ADMELOG) corrective regimen sliding scale   SubCutaneous three times a day before meals  insulin lispro (ADMELOG) corrective regimen sliding scale   SubCutaneous at bedtime  insulin lispro (ADMELOG) corrective regimen sliding scale   SubCutaneous every 6 hours  mesalamine DR Capsule 1200 milliGRAM(s) Oral three times a day  pantoprazole  Injectable 40 milliGRAM(s) IV Push daily  simethicone 80 milliGRAM(s) Chew three times a day  sodium chloride 2 Gram(s) Oral every 6 hours  sodium phosphate 15 milliMole(s)/250 mL IVPB 15 milliMole(s) IV Intermittent once    MEDICATIONS  (PRN):  acetaminophen     Tablet .. 650 milliGRAM(s) Oral every 6 hours PRN Temp greater or equal to 38C (100.4F), Mild Pain (1 - 3), Moderate Pain (4 - 6), Severe Pain (7 - 10)  dextrose Oral Gel 15 Gram(s) Oral once PRN Blood Glucose LESS THAN 70 milliGRAM(s)/deciliter  melatonin 3 milliGRAM(s) Oral at bedtime PRN Insomnia  ondansetron Injectable 4 milliGRAM(s) IV Push every 8 hours PRN Nausea and/or Vomiting      Allergies    No Known Allergies    Intolerances        REVIEW OF SYSTEMS:  Other systems currently negative unless otherwise specified above.    Vital Signs Last 24 Hrs  T(C): 36.9 (25 Jul 2023 05:29), Max: 36.9 (24 Jul 2023 13:00)  T(F): 98.5 (25 Jul 2023 05:29), Max: 98.5 (24 Jul 2023 13:00)  HR: 86 (25 Jul 2023 05:29) (86 - 91)  BP: 105/71 (25 Jul 2023 05:29) (105/71 - 132/81)  BP(mean): --  RR: 17 (25 Jul 2023 05:29) (17 - 18)  SpO2: 98% (25 Jul 2023 05:29) (95% - 98%)    Parameters below as of 25 Jul 2023 05:29  Patient On (Oxygen Delivery Method): room air            PHYSICAL EXAM:  GENERAL: No apparent distress, appears stated age  EYES: Conjunctiva and sclera clear, no discharge  ENMT: Moist mucous membranes, no nasal discharge  CHEST/LUNG: Clear to auscultation bilaterally, no wheeze or rales  HEART: Regular rhythm, no rubs or gallops  ABDOMEN: Soft, Nontender, Nondistended  EXTREMITIES:  No clubbing, cyanosis but trace edema  SKIN: No rash, no new discoloration      LABS:                        13.1   11.98 )-----------( 291      ( 25 Jul 2023 08:02 )             37.1     25 Jul 2023 08:02    128    |  97     |  8      ----------------------------<  175    4.3     |  25     |  0.36     Ca    7.3        25 Jul 2023 08:02  Phos  2.4       25 Jul 2023 08:02  Mg     1.8       25 Jul 2023 08:02    TPro  4.0    /  Alb  1.2    /  TBili  0.7    /  DBili  x      /  AST  21     /  ALT  38     /  AlkPhos  48     25 Jul 2023 08:02    PT/INR - ( 25 Jul 2023 08:02 )   PT: 17.5 sec;   INR: 1.48 ratio         PTT - ( 25 Jul 2023 08:02 )  PTT:33.0 sec  Urinalysis Basic - ( 25 Jul 2023 08:02 )    Color: x / Appearance: x / SG: x / pH: x  Gluc: 175 mg/dL / Ketone: x  / Bili: x / Urobili: x   Blood: x / Protein: x / Nitrite: x   Leuk Esterase: x / RBC: x / WBC x   Sq Epi: x / Non Sq Epi: x / Bacteria: x      CAPILLARY BLOOD GLUCOSE      POCT Blood Glucose.: 166 mg/dL (25 Jul 2023 12:08)  POCT Blood Glucose.: 190 mg/dL (25 Jul 2023 07:56)  POCT Blood Glucose.: 175 mg/dL (24 Jul 2023 21:29)  POCT Blood Glucose.: 192 mg/dL (24 Jul 2023 17:07)        RADIOLOGY & ADDITIONAL TESTS:      Images reviewed personally    Consultant Notes Reviewed and Care Discussed with relevant Consultants.

## 2023-07-25 NOTE — PROGRESS NOTE ADULT - ASSESSMENT
49 yo M with PMH of DM who presented to ED with complaint of elevated glucose and nausea, abdominal pain, fever, and an episode of vomiting. Denies diarrhea.   ED course: CBC showed bandemia increased from 18 to 25. CT showed enteritis and unable to rule out partial SBO. findings d/w Dr. Correa who advised NG tube and admission for further testing.     Enteritis  Partial SBO  - febrile with bandemia, abd pain and imaging and exam suggesting abd process/enteritis  - GI PCR negative  - all cx NGTD  - s/p vanc x48H  - s/p zosyn x7 day course  Recommendations:   - monitor off ABx  - serial abdominal exams  - trend temps/WBC  - appreciate surgery recs  - appreciate GI recs  - additional management per primary team    Infectious Diseases will sign off at this time.   Please call with any questions.   Luli Franz M.D.  [facility] [phone]

## 2023-07-26 LAB
ANION GAP SERPL CALC-SCNC: 8 MMOL/L — SIGNIFICANT CHANGE UP (ref 5–17)
BAKER'S YEAST IGA QN IA: 5.2 UNITS — SIGNIFICANT CHANGE UP
BAKER'S YEAST IGA QN IA: NEGATIVE — SIGNIFICANT CHANGE UP
BAKER'S YEAST IGG QN IA: <5 UNITS — SIGNIFICANT CHANGE UP
BAKER'S YEAST IGG QN IA: NEGATIVE — SIGNIFICANT CHANGE UP
BASOPHILS # BLD AUTO: 0 K/UL — SIGNIFICANT CHANGE UP (ref 0–0.2)
BASOPHILS NFR BLD AUTO: 0 % — SIGNIFICANT CHANGE UP (ref 0–2)
BUN SERPL-MCNC: 9 MG/DL — SIGNIFICANT CHANGE UP (ref 7–23)
CALCIUM SERPL-MCNC: 7.5 MG/DL — LOW (ref 8.4–10.5)
CHLORIDE SERPL-SCNC: 97 MMOL/L — SIGNIFICANT CHANGE UP (ref 96–108)
CO2 SERPL-SCNC: 23 MMOL/L — SIGNIFICANT CHANGE UP (ref 22–31)
CREAT SERPL-MCNC: 0.36 MG/DL — LOW (ref 0.5–1.3)
CULTURE RESULTS: SIGNIFICANT CHANGE UP
CULTURE RESULTS: SIGNIFICANT CHANGE UP
EGFR: 138 ML/MIN/1.73M2 — SIGNIFICANT CHANGE UP
EOSINOPHIL # BLD AUTO: 0 K/UL — SIGNIFICANT CHANGE UP (ref 0–0.5)
EOSINOPHIL NFR BLD AUTO: 0 % — SIGNIFICANT CHANGE UP (ref 0–6)
GLUCOSE BLDC GLUCOMTR-MCNC: 149 MG/DL — HIGH (ref 70–99)
GLUCOSE BLDC GLUCOMTR-MCNC: 166 MG/DL — HIGH (ref 70–99)
GLUCOSE BLDC GLUCOMTR-MCNC: 170 MG/DL — HIGH (ref 70–99)
GLUCOSE BLDC GLUCOMTR-MCNC: 171 MG/DL — HIGH (ref 70–99)
GLUCOSE BLDC GLUCOMTR-MCNC: 185 MG/DL — HIGH (ref 70–99)
GLUCOSE BLDC GLUCOMTR-MCNC: 224 MG/DL — HIGH (ref 70–99)
GLUCOSE SERPL-MCNC: 161 MG/DL — HIGH (ref 70–99)
HCT VFR BLD CALC: 37.6 % — LOW (ref 39–50)
HGB BLD-MCNC: 13.4 G/DL — SIGNIFICANT CHANGE UP (ref 13–17)
LYMPHOCYTES # BLD AUTO: 1.47 K/UL — SIGNIFICANT CHANGE UP (ref 1–3.3)
LYMPHOCYTES # BLD AUTO: 12 % — LOW (ref 13–44)
LYMPHOCYTES # SPEC AUTO: 4 % — HIGH (ref 0–0)
MANUAL SMEAR VERIFICATION: SIGNIFICANT CHANGE UP
MCHC RBC-ENTMCNC: 30.4 PG — SIGNIFICANT CHANGE UP (ref 27–34)
MCHC RBC-ENTMCNC: 35.6 GM/DL — SIGNIFICANT CHANGE UP (ref 32–36)
MCV RBC AUTO: 85.3 FL — SIGNIFICANT CHANGE UP (ref 80–100)
METAMYELOCYTES # FLD: 2 % — HIGH (ref 0–0)
MONOCYTES # BLD AUTO: 0.74 K/UL — SIGNIFICANT CHANGE UP (ref 0–0.9)
MONOCYTES NFR BLD AUTO: 6 % — SIGNIFICANT CHANGE UP (ref 2–14)
MYELOCYTES NFR BLD: 1 % — HIGH (ref 0–0)
NEUTROPHILS # BLD AUTO: 9.22 K/UL — HIGH (ref 1.8–7.4)
NEUTROPHILS NFR BLD AUTO: 69 % — SIGNIFICANT CHANGE UP (ref 43–77)
NEUTS BAND # BLD: 6 % — SIGNIFICANT CHANGE UP (ref 0–8)
NRBC # BLD: 0 — SIGNIFICANT CHANGE UP
NRBC # BLD: SIGNIFICANT CHANGE UP /100 WBCS (ref 0–0)
OSMOLALITY UR: 621 MOSM/KG — SIGNIFICANT CHANGE UP (ref 50–1200)
PLAT MORPH BLD: NORMAL — SIGNIFICANT CHANGE UP
PLATELET # BLD AUTO: 350 K/UL — SIGNIFICANT CHANGE UP (ref 150–400)
POTASSIUM SERPL-MCNC: 4.4 MMOL/L — SIGNIFICANT CHANGE UP (ref 3.5–5.3)
POTASSIUM SERPL-SCNC: 4.4 MMOL/L — SIGNIFICANT CHANGE UP (ref 3.5–5.3)
RBC # BLD: 4.41 M/UL — SIGNIFICANT CHANGE UP (ref 4.2–5.8)
RBC # FLD: 12.5 % — SIGNIFICANT CHANGE UP (ref 10.3–14.5)
RBC BLD AUTO: NORMAL — SIGNIFICANT CHANGE UP
SODIUM SERPL-SCNC: 128 MMOL/L — LOW (ref 135–145)
SODIUM UR-SCNC: 175 MMOL/L — SIGNIFICANT CHANGE UP
SPECIMEN SOURCE: SIGNIFICANT CHANGE UP
SPECIMEN SOURCE: SIGNIFICANT CHANGE UP
URATE SERPL-MCNC: 1.6 MG/DL — LOW (ref 3.4–8.8)
WBC # BLD: 12.29 K/UL — HIGH (ref 3.8–10.5)
WBC # FLD AUTO: 12.29 K/UL — HIGH (ref 3.8–10.5)

## 2023-07-26 PROCEDURE — 99232 SBSQ HOSP IP/OBS MODERATE 35: CPT

## 2023-07-26 RX ORDER — TOLVAPTAN 15 MG/1
15 TABLET ORAL ONCE
Refills: 0 | Status: COMPLETED | OUTPATIENT
Start: 2023-07-26 | End: 2023-07-26

## 2023-07-26 RX ADMIN — TOLVAPTAN 15 MILLIGRAM(S): 15 TABLET ORAL at 13:02

## 2023-07-26 RX ADMIN — Medication 2: at 13:02

## 2023-07-26 RX ADMIN — Medication 4: at 17:24

## 2023-07-26 RX ADMIN — Medication 1200 MILLIGRAM(S): at 21:40

## 2023-07-26 RX ADMIN — SIMETHICONE 80 MILLIGRAM(S): 80 TABLET, CHEWABLE ORAL at 21:40

## 2023-07-26 RX ADMIN — SIMETHICONE 80 MILLIGRAM(S): 80 TABLET, CHEWABLE ORAL at 13:58

## 2023-07-26 RX ADMIN — Medication 1200 MILLIGRAM(S): at 13:58

## 2023-07-26 RX ADMIN — SIMETHICONE 80 MILLIGRAM(S): 80 TABLET, CHEWABLE ORAL at 06:46

## 2023-07-26 RX ADMIN — Medication 1200 MILLIGRAM(S): at 06:45

## 2023-07-26 RX ADMIN — PANTOPRAZOLE SODIUM 40 MILLIGRAM(S): 20 TABLET, DELAYED RELEASE ORAL at 13:02

## 2023-07-26 NOTE — PROGRESS NOTE ADULT - SUBJECTIVE AND OBJECTIVE BOX
INTERVAL HPI/OVERNIGHT EVENTS:  feels better  MEDICATIONS  (STANDING):  dextrose 5%. 1000 milliLiter(s) (100 mL/Hr) IV Continuous <Continuous>  dextrose 5%. 1000 milliLiter(s) (50 mL/Hr) IV Continuous <Continuous>  dextrose 50% Injectable 12.5 Gram(s) IV Push once  dextrose 50% Injectable 25 Gram(s) IV Push once  dextrose 50% Injectable 25 Gram(s) IV Push once  glucagon  Injectable 1 milliGRAM(s) IntraMuscular once  glucagon  Injectable 1 milliGRAM(s) IntraMuscular once  insulin lispro (ADMELOG) corrective regimen sliding scale   SubCutaneous three times a day before meals  insulin lispro (ADMELOG) corrective regimen sliding scale   SubCutaneous every 6 hours  insulin lispro (ADMELOG) corrective regimen sliding scale   SubCutaneous at bedtime  mesalamine DR Capsule 1200 milliGRAM(s) Oral three times a day  pantoprazole  Injectable 40 milliGRAM(s) IV Push daily  simethicone 80 milliGRAM(s) Chew three times a day    MEDICATIONS  (PRN):  acetaminophen     Tablet .. 650 milliGRAM(s) Oral every 6 hours PRN Temp greater or equal to 38C (100.4F), Mild Pain (1 - 3), Moderate Pain (4 - 6), Severe Pain (7 - 10)  dextrose Oral Gel 15 Gram(s) Oral once PRN Blood Glucose LESS THAN 70 milliGRAM(s)/deciliter  melatonin 3 milliGRAM(s) Oral at bedtime PRN Insomnia  ondansetron Injectable 4 milliGRAM(s) IV Push every 8 hours PRN Nausea and/or Vomiting      Allergies    No Known Allergies    Intolerances        Review of Systems:    General:  No wt loss, fevers, chills, night sweats,fatigue,   Eyes:  Good vision, no reported pain  ENT:  No sore throat, pain, runny nose, dysphagia  CV:  No pain, palpitatioins, hypo/hypertension  Resp:  No dyspnea, cough, tachypnea, wheezing  GI:  No pain, No nausea, No vomiting, No diarrhea, No constipatiion, No weight loss, No fever, No pruritis, No rectal bleeding, No tarry stools, No dysphagia,  :  No pain, bleeding, incontinence, nocturia  Muscle:  No pain, weakness  Neuro:  No weakness, tingling, memory problems  Psych:  No fatigue, insomnia, mood problems, depression  Endocrine:  No polyuria, polydypsia, cold/heat intolerance  Heme:  No petechiae, ecchymosis, easy bruisability  Skin:  No rash, tattoos, scars, edema      Vital Signs Last 24 Hrs  T(C): 37.7 (26 Jul 2023 10:46), Max: 37.7 (26 Jul 2023 10:46)  T(F): 99.9 (26 Jul 2023 10:46), Max: 99.9 (26 Jul 2023 10:46)  HR: 87 (26 Jul 2023 10:46) (84 - 87)  BP: 105/71 (26 Jul 2023 10:46) (105/71 - 117/75)  BP(mean): --  RR: 18 (26 Jul 2023 10:46) (16 - 18)  SpO2: 98% (26 Jul 2023 10:46) (97% - 98%)    Parameters below as of 26 Jul 2023 10:46  Patient On (Oxygen Delivery Method): room air        PHYSICAL EXAM:    Constitutional: NAD, well-developed  HEENT: EOMI, throat clear  Neck: No LAD, supple  Respiratory: CTA and P  Cardiovascular: S1 and S2, RRR, no M  Gastrointestinal: BS+, soft, NT/ND, neg HSM,  Extremities: No peripheral edema, neg clubing, cyanosis  Vascular: 2+ peripheral pulses  Neurological: A/O x 3, no focal deficits  Psychiatric: Normal mood, normal affect  Skin: No rashes      LABS:                        13.4   12.29 )-----------( 350      ( 26 Jul 2023 08:23 )             37.6     07-26    128<L>  |  97  |  9   ----------------------------<  161<H>  4.4   |  23  |  0.36<L>    Ca    7.5<L>      26 Jul 2023 08:23  Phos  2.4     07-25  Mg     1.8     07-25    TPro  4.0<L>  /  Alb  1.2<L>  /  TBili  0.7  /  DBili  x   /  AST  21  /  ALT  38  /  AlkPhos  48  07-25    PT/INR - ( 25 Jul 2023 08:02 )   PT: 17.5 sec;   INR: 1.48 ratio         PTT - ( 25 Jul 2023 08:02 )  PTT:33.0 sec  Urinalysis Basic - ( 26 Jul 2023 08:23 )    Color: x / Appearance: x / SG: x / pH: x  Gluc: 161 mg/dL / Ketone: x  / Bili: x / Urobili: x   Blood: x / Protein: x / Nitrite: x   Leuk Esterase: x / RBC: x / WBC x   Sq Epi: x / Non Sq Epi: x / Bacteria: x        RADIOLOGY & ADDITIONAL TESTS:

## 2023-07-26 NOTE — PROGRESS NOTE ADULT - ASSESSMENT
48M with PMHx of T2DM presenting with hyperglycemia (not DKA), abdominal pain, and nausea. CT A&P showing possible enteritis or partial SBO.    #Enteritis vs. Partial SBO?  - Surgery following and may be diabetic in nature or infectious  - CT enterography on 7/21: "Generalized, continuous mural hyperenhancement/small bowel wall thickening compatible with nonspecific small bowel inflammation. Moderate dilated small bowel with gradual transition to normal loops distally, which may be associated with partial bowel obstruction as previously noted."  - Finished a 7 day course of Zosyn on 7/22  - Prior NG tube trial  - GI following & ASCA and ANCA sent  - GI started Mesalamine on 7/22  - f/up updated GI recs - 2-week Mesalamine on discharge    #Hyponatremia  - f/up renal consult  - salt tabs  - Is/Os    #T2DM  - FS TID AC & insulin sliding scale  - A1c is 8.9 indicating he likely needs dual therapy    #Hypoalbuminemia  - ?GI vs  protein loss  - Nutrition consulted      DC home pending improvement in sodium  d/w Dr. Odom at bedside

## 2023-07-26 NOTE — PROGRESS NOTE ADULT - SUBJECTIVE AND OBJECTIVE BOX
Patient is a 48y old  Male who presents with a chief complaint of abd pain (25 Jul 2023 12:59)      INTERVAL History of Present Illness/OVERNIGHT EVENTS: sodium remains low    MEDICATIONS  (STANDING):  dextrose 5%. 1000 milliLiter(s) (50 mL/Hr) IV Continuous <Continuous>  dextrose 5%. 1000 milliLiter(s) (100 mL/Hr) IV Continuous <Continuous>  dextrose 50% Injectable 25 Gram(s) IV Push once  dextrose 50% Injectable 25 Gram(s) IV Push once  dextrose 50% Injectable 12.5 Gram(s) IV Push once  glucagon  Injectable 1 milliGRAM(s) IntraMuscular once  glucagon  Injectable 1 milliGRAM(s) IntraMuscular once  insulin lispro (ADMELOG) corrective regimen sliding scale   SubCutaneous at bedtime  insulin lispro (ADMELOG) corrective regimen sliding scale   SubCutaneous every 6 hours  insulin lispro (ADMELOG) corrective regimen sliding scale   SubCutaneous three times a day before meals  mesalamine DR Capsule 1200 milliGRAM(s) Oral three times a day  pantoprazole  Injectable 40 milliGRAM(s) IV Push daily  simethicone 80 milliGRAM(s) Chew three times a day  tolvaptan 15 milliGRAM(s) Oral once    MEDICATIONS  (PRN):  acetaminophen     Tablet .. 650 milliGRAM(s) Oral every 6 hours PRN Temp greater or equal to 38C (100.4F), Mild Pain (1 - 3), Moderate Pain (4 - 6), Severe Pain (7 - 10)  dextrose Oral Gel 15 Gram(s) Oral once PRN Blood Glucose LESS THAN 70 milliGRAM(s)/deciliter  melatonin 3 milliGRAM(s) Oral at bedtime PRN Insomnia  ondansetron Injectable 4 milliGRAM(s) IV Push every 8 hours PRN Nausea and/or Vomiting      Allergies    No Known Allergies    Intolerances        REVIEW OF SYSTEMS:  Other systems currently negative unless otherwise specified above.    Vital Signs Last 24 Hrs  T(C): 37.7 (26 Jul 2023 10:46), Max: 37.7 (26 Jul 2023 10:46)  T(F): 99.9 (26 Jul 2023 10:46), Max: 99.9 (26 Jul 2023 10:46)  HR: 87 (26 Jul 2023 10:46) (84 - 87)  BP: 105/71 (26 Jul 2023 10:46) (105/71 - 118/64)  BP(mean): --  RR: 18 (26 Jul 2023 10:46) (16 - 18)  SpO2: 98% (26 Jul 2023 10:46) (97% - 98%)    Parameters below as of 26 Jul 2023 10:46  Patient On (Oxygen Delivery Method): room air            PHYSICAL EXAM:  GENERAL: No apparent distress, appears stated age  EYES: Conjunctiva and sclera clear, no discharge  ENMT: Moist mucous membranes, no nasal discharge  CHEST/LUNG: Clear to auscultation bilaterally, no wheeze or rales  HEART: Regular rhythm, no rubs or gallops  ABDOMEN: Soft, Nontender, Nondistended  EXTREMITIES:  No clubbing, cyanosis but trace pedal edema      LABS:                        13.4   12.29 )-----------( 350      ( 26 Jul 2023 08:23 )             37.6     26 Jul 2023 08:23    128    |  97     |  9      ----------------------------<  161    4.4     |  23     |  0.36     Ca    7.5        26 Jul 2023 08:23      PT/INR - ( 25 Jul 2023 08:02 )   PT: 17.5 sec;   INR: 1.48 ratio         PTT - ( 25 Jul 2023 08:02 )  PTT:33.0 sec  Urinalysis Basic - ( 26 Jul 2023 08:23 )    Color: x / Appearance: x / SG: x / pH: x  Gluc: 161 mg/dL / Ketone: x  / Bili: x / Urobili: x   Blood: x / Protein: x / Nitrite: x   Leuk Esterase: x / RBC: x / WBC x   Sq Epi: x / Non Sq Epi: x / Bacteria: x      CAPILLARY BLOOD GLUCOSE      POCT Blood Glucose.: 149 mg/dL (26 Jul 2023 07:42)  POCT Blood Glucose.: 166 mg/dL (26 Jul 2023 06:30)  POCT Blood Glucose.: 170 mg/dL (26 Jul 2023 00:49)  POCT Blood Glucose.: 137 mg/dL (25 Jul 2023 21:08)  POCT Blood Glucose.: 157 mg/dL (25 Jul 2023 16:35)  POCT Blood Glucose.: 166 mg/dL (25 Jul 2023 12:08)        RADIOLOGY & ADDITIONAL TESTS:      Images reviewed personally    Consultant Notes Reviewed and Care Discussed with relevant Consultants.

## 2023-07-26 NOTE — PROGRESS NOTE ADULT - SUBJECTIVE AND OBJECTIVE BOX
Patient is a 48y old  Male who presents with a chief complaint of abd pain (24 Jul 2023 10:54)    Patient seen in follow up for hyponatremia.        PAST MEDICAL HISTORY:  DM (diabetes mellitus)      MEDICATIONS  (STANDING):  dextrose 5%. 1000 milliLiter(s) (100 mL/Hr) IV Continuous <Continuous>  dextrose 5%. 1000 milliLiter(s) (50 mL/Hr) IV Continuous <Continuous>  dextrose 50% Injectable 12.5 Gram(s) IV Push once  dextrose 50% Injectable 25 Gram(s) IV Push once  dextrose 50% Injectable 25 Gram(s) IV Push once  glucagon  Injectable 1 milliGRAM(s) IntraMuscular once  glucagon  Injectable 1 milliGRAM(s) IntraMuscular once  insulin lispro (ADMELOG) corrective regimen sliding scale   SubCutaneous at bedtime  insulin lispro (ADMELOG) corrective regimen sliding scale   SubCutaneous three times a day before meals  insulin lispro (ADMELOG) corrective regimen sliding scale   SubCutaneous every 6 hours  mesalamine DR Capsule 1200 milliGRAM(s) Oral three times a day  pantoprazole  Injectable 40 milliGRAM(s) IV Push daily  simethicone 80 milliGRAM(s) Chew three times a day    MEDICATIONS  (PRN):  acetaminophen     Tablet .. 650 milliGRAM(s) Oral every 6 hours PRN Temp greater or equal to 38C (100.4F), Mild Pain (1 - 3), Moderate Pain (4 - 6), Severe Pain (7 - 10)  dextrose Oral Gel 15 Gram(s) Oral once PRN Blood Glucose LESS THAN 70 milliGRAM(s)/deciliter  melatonin 3 milliGRAM(s) Oral at bedtime PRN Insomnia  ondansetron Injectable 4 milliGRAM(s) IV Push every 8 hours PRN Nausea and/or Vomiting    T(C): 36.8 (07-26-23 @ 05:14), Max: 36.9 (07-24-23 @ 13:00)  HR: 85 (07-26-23 @ 05:14) (84 - 91)  BP: 105/71 (07-26-23 @ 05:14) (105/71 - 132/81)  RR: 17 (07-26-23 @ 05:14)  SpO2: 98% (07-26-23 @ 05:14)  Wt(kg): --  I&O's Detail        PHYSICAL EXAM:  General: No distress  Respiratory: b/l air entry  Cardiovascular: S1 S2  Gastrointestinal: soft  Extremities:  edema                            LABORATORY:                        13.4   12.29 )-----------( 350      ( 26 Jul 2023 08:23 )             37.6     07-26    128<L>  |  97  |  9   ----------------------------<  161<H>  4.4   |  23  |  0.36<L>    Ca    7.5<L>      26 Jul 2023 08:23  Phos  2.4     07-25  Mg     1.8     07-25    TPro  4.0<L>  /  Alb  1.2<L>  /  TBili  0.7  /  DBili  x   /  AST  21  /  ALT  38  /  AlkPhos  48  07-25    Sodium: 128 mmol/L (07-26 @ 08:23)  Sodium: 128 mmol/L (07-25 @ 08:02)    Potassium: 4.4 mmol/L (07-26 @ 08:23)  Potassium: 4.3 mmol/L (07-25 @ 08:02)    Hemoglobin: 13.4 g/dL (07-26 @ 08:23)  Hemoglobin: 13.1 g/dL (07-25 @ 08:02)  Hemoglobin: 13.3 g/dL (07-24 @ 06:00)    Creatinine, Serum 0.36 (07-26 @ 08:23)  Creatinine, Serum 0.36 (07-25 @ 08:02)  Creatinine, Serum 0.45 (07-24 @ 06:00)        LIVER FUNCTIONS - ( 25 Jul 2023 08:02 )  Alb: 1.2 g/dL / Pro: 4.0 g/dL / ALK PHOS: 48 U/L / ALT: 38 U/L DA / AST: 21 U/L / GGT: x           Urinalysis Basic - ( 26 Jul 2023 08:23 )    Color: x / Appearance: x / SG: x / pH: x  Gluc: 161 mg/dL / Ketone: x  / Bili: x / Urobili: x   Blood: x / Protein: x / Nitrite: x   Leuk Esterase: x / RBC: x / WBC x   Sq Epi: x / Non Sq Epi: x / Bacteria: x

## 2023-07-26 NOTE — PROGRESS NOTE ADULT - ASSESSMENT
Hyponatremia: SIADH, low solute intake  SBO, Enteritis  Diabetes  Hypophosphatemia    No improvement in sodium levels. Samsca x 1 dose. Monitor blood sugar levels. Insulin coverage as needed.   Will follow electrolytes and renal function trend. GI follow up.

## 2023-07-27 VITALS
SYSTOLIC BLOOD PRESSURE: 114 MMHG | DIASTOLIC BLOOD PRESSURE: 75 MMHG | OXYGEN SATURATION: 97 % | HEART RATE: 88 BPM | TEMPERATURE: 97 F

## 2023-07-27 LAB
ALBUMIN SERPL ELPH-MCNC: 1.6 G/DL — LOW (ref 3.3–5)
ALP SERPL-CCNC: 95 U/L — SIGNIFICANT CHANGE UP (ref 30–120)
ALT FLD-CCNC: 78 U/L DA — HIGH (ref 10–60)
ANION GAP SERPL CALC-SCNC: 5 MMOL/L — SIGNIFICANT CHANGE UP (ref 5–17)
AST SERPL-CCNC: 34 U/L — SIGNIFICANT CHANGE UP (ref 10–40)
BILIRUB SERPL-MCNC: 0.3 MG/DL — SIGNIFICANT CHANGE UP (ref 0.2–1.2)
BUN SERPL-MCNC: 7 MG/DL — SIGNIFICANT CHANGE UP (ref 7–23)
CALCIUM SERPL-MCNC: 7.9 MG/DL — LOW (ref 8.4–10.5)
CHLORIDE SERPL-SCNC: 104 MMOL/L — SIGNIFICANT CHANGE UP (ref 96–108)
CO2 SERPL-SCNC: 28 MMOL/L — SIGNIFICANT CHANGE UP (ref 22–31)
CREAT SERPL-MCNC: 0.51 MG/DL — SIGNIFICANT CHANGE UP (ref 0.5–1.3)
EGFR: 124 ML/MIN/1.73M2 — SIGNIFICANT CHANGE UP
GLUCOSE BLDC GLUCOMTR-MCNC: 147 MG/DL — HIGH (ref 70–99)
GLUCOSE BLDC GLUCOMTR-MCNC: 163 MG/DL — HIGH (ref 70–99)
GLUCOSE BLDC GLUCOMTR-MCNC: 203 MG/DL — HIGH (ref 70–99)
GLUCOSE BLDC GLUCOMTR-MCNC: 211 MG/DL — HIGH (ref 70–99)
GLUCOSE SERPL-MCNC: 232 MG/DL — HIGH (ref 70–99)
MAGNESIUM SERPL-MCNC: 2.1 MG/DL — SIGNIFICANT CHANGE UP (ref 1.6–2.6)
POTASSIUM SERPL-MCNC: 4.4 MMOL/L — SIGNIFICANT CHANGE UP (ref 3.5–5.3)
POTASSIUM SERPL-SCNC: 4.4 MMOL/L — SIGNIFICANT CHANGE UP (ref 3.5–5.3)
PROT SERPL-MCNC: 4.7 G/DL — LOW (ref 6–8.3)
SODIUM SERPL-SCNC: 137 MMOL/L — SIGNIFICANT CHANGE UP (ref 135–145)

## 2023-07-27 PROCEDURE — 85730 THROMBOPLASTIN TIME PARTIAL: CPT

## 2023-07-27 PROCEDURE — 84100 ASSAY OF PHOSPHORUS: CPT

## 2023-07-27 PROCEDURE — 85610 PROTHROMBIN TIME: CPT

## 2023-07-27 PROCEDURE — 36415 COLL VENOUS BLD VENIPUNCTURE: CPT

## 2023-07-27 PROCEDURE — 83520 IMMUNOASSAY QUANT NOS NONAB: CPT

## 2023-07-27 PROCEDURE — 83930 ASSAY OF BLOOD OSMOLALITY: CPT

## 2023-07-27 PROCEDURE — 80202 ASSAY OF VANCOMYCIN: CPT

## 2023-07-27 PROCEDURE — 84132 ASSAY OF SERUM POTASSIUM: CPT

## 2023-07-27 PROCEDURE — 84436 ASSAY OF TOTAL THYROXINE: CPT

## 2023-07-27 PROCEDURE — 99285 EMERGENCY DEPT VISIT HI MDM: CPT

## 2023-07-27 PROCEDURE — 83735 ASSAY OF MAGNESIUM: CPT

## 2023-07-27 PROCEDURE — 96361 HYDRATE IV INFUSION ADD-ON: CPT

## 2023-07-27 PROCEDURE — 0225U NFCT DS DNA&RNA 21 SARSCOV2: CPT

## 2023-07-27 PROCEDURE — 83036 HEMOGLOBIN GLYCOSYLATED A1C: CPT

## 2023-07-27 PROCEDURE — 99239 HOSP IP/OBS DSCHRG MGMT >30: CPT

## 2023-07-27 PROCEDURE — 87640 STAPH A DNA AMP PROBE: CPT

## 2023-07-27 PROCEDURE — 87641 MR-STAPH DNA AMP PROBE: CPT

## 2023-07-27 PROCEDURE — 86036 ANCA SCREEN EACH ANTIBODY: CPT

## 2023-07-27 PROCEDURE — 87507 IADNA-DNA/RNA PROBE TQ 12-25: CPT

## 2023-07-27 PROCEDURE — 83935 ASSAY OF URINE OSMOLALITY: CPT

## 2023-07-27 PROCEDURE — 96365 THER/PROPH/DIAG IV INF INIT: CPT

## 2023-07-27 PROCEDURE — 84145 PROCALCITONIN (PCT): CPT

## 2023-07-27 PROCEDURE — 71045 X-RAY EXAM CHEST 1 VIEW: CPT

## 2023-07-27 PROCEDURE — 74250 X-RAY XM SM INT 1CNTRST STD: CPT

## 2023-07-27 PROCEDURE — 93005 ELECTROCARDIOGRAM TRACING: CPT

## 2023-07-27 PROCEDURE — 85025 COMPLETE CBC W/AUTO DIFF WBC: CPT

## 2023-07-27 PROCEDURE — 96375 TX/PRO/DX INJ NEW DRUG ADDON: CPT

## 2023-07-27 PROCEDURE — 85027 COMPLETE CBC AUTOMATED: CPT

## 2023-07-27 PROCEDURE — 84443 ASSAY THYROID STIM HORMONE: CPT

## 2023-07-27 PROCEDURE — 87040 BLOOD CULTURE FOR BACTERIA: CPT

## 2023-07-27 PROCEDURE — 74177 CT ABD & PELVIS W/CONTRAST: CPT | Mod: MA

## 2023-07-27 PROCEDURE — 84550 ASSAY OF BLOOD/URIC ACID: CPT

## 2023-07-27 PROCEDURE — 81001 URINALYSIS AUTO W/SCOPE: CPT

## 2023-07-27 PROCEDURE — 82962 GLUCOSE BLOOD TEST: CPT

## 2023-07-27 PROCEDURE — 87086 URINE CULTURE/COLONY COUNT: CPT

## 2023-07-27 PROCEDURE — 80048 BASIC METABOLIC PNL TOTAL CA: CPT

## 2023-07-27 PROCEDURE — 83605 ASSAY OF LACTIC ACID: CPT

## 2023-07-27 PROCEDURE — 84300 ASSAY OF URINE SODIUM: CPT

## 2023-07-27 PROCEDURE — 80053 COMPREHEN METABOLIC PANEL: CPT

## 2023-07-27 PROCEDURE — 74019 RADEX ABDOMEN 2 VIEWS: CPT

## 2023-07-27 PROCEDURE — 82533 TOTAL CORTISOL: CPT

## 2023-07-27 PROCEDURE — 84480 ASSAY TRIIODOTHYRONINE (T3): CPT

## 2023-07-27 RX ORDER — PANTOPRAZOLE SODIUM 20 MG/1
40 TABLET, DELAYED RELEASE ORAL
Refills: 0 | Status: DISCONTINUED | OUTPATIENT
Start: 2023-07-27 | End: 2023-07-27

## 2023-07-27 RX ORDER — LINAGLIPTIN 5 MG/1
5 TABLET, FILM COATED ORAL DAILY
Refills: 0 | Status: DISCONTINUED | OUTPATIENT
Start: 2023-07-28 | End: 2023-07-27

## 2023-07-27 RX ORDER — LINAGLIPTIN 5 MG/1
1 TABLET, FILM COATED ORAL
Qty: 30 | Refills: 0
Start: 2023-07-27

## 2023-07-27 RX ORDER — METFORMIN HYDROCHLORIDE 850 MG/1
500 TABLET ORAL
Refills: 0 | Status: DISCONTINUED | OUTPATIENT
Start: 2023-07-27 | End: 2023-07-27

## 2023-07-27 RX ORDER — MESALAMINE 400 MG
3 TABLET, DELAYED RELEASE (ENTERIC COATED) ORAL
Qty: 90 | Refills: 0
Start: 2023-07-27 | End: 2023-08-05

## 2023-07-27 RX ORDER — METFORMIN HYDROCHLORIDE 850 MG/1
1 TABLET ORAL
Qty: 60 | Refills: 0
Start: 2023-07-27 | End: 2023-08-25

## 2023-07-27 RX ORDER — PANTOPRAZOLE SODIUM 20 MG/1
1 TABLET, DELAYED RELEASE ORAL
Qty: 10 | Refills: 0
Start: 2023-07-27 | End: 2023-08-05

## 2023-07-27 RX ADMIN — Medication 1200 MILLIGRAM(S): at 05:45

## 2023-07-27 RX ADMIN — Medication 1200 MILLIGRAM(S): at 12:52

## 2023-07-27 RX ADMIN — Medication 4: at 08:56

## 2023-07-27 RX ADMIN — SIMETHICONE 80 MILLIGRAM(S): 80 TABLET, CHEWABLE ORAL at 12:52

## 2023-07-27 RX ADMIN — Medication 4: at 12:39

## 2023-07-27 RX ADMIN — SIMETHICONE 80 MILLIGRAM(S): 80 TABLET, CHEWABLE ORAL at 05:44

## 2023-07-27 NOTE — DISCHARGE NOTE PROVIDER - NSDCCPCAREPLAN_GEN_ALL_CORE_FT
PRINCIPAL DISCHARGE DIAGNOSIS  Diagnosis: Enteritis  Assessment and Plan of Treatment:       SECONDARY DISCHARGE DIAGNOSES  Diagnosis: Type 2 diabetes mellitus  Assessment and Plan of Treatment:

## 2023-07-27 NOTE — CHART NOTE - NSCHARTNOTEFT_GEN_A_CORE
Assessment: Per H&P "Patient is a 47 yo M with PMH of IDDM who presented to ED yesterday with complaint of elevated glucose and nausea. He reported abdominal pain, fever, and an episode of vomiting. He reports he stopped his insulin 2 weeks ago as he says his sugars were under control and no longer needed it. He had a negative workup for DKA and was given oral potassium and was discharged after feeling better with instructions to resume his insulin and followup with his PCP. He returned today   returned today with reports of fever and worsening abdominal pain. Denies diarrhea. He reports he is a cigar smoker. "      New consult ordered for assessment/education.  Pt previous NPO 7/15-, full liquid -, downgraded to NPO/clears - given po intolerance. Per sx note "The cause of his obstruction is inflammation and the obstruction will improve as the inflammation improves." Diet advanced to Con CHO since . Appetite/po intake improved, tolerates ONS. Encouraged PO intake as tolerated, especially of protein-rich foods. Pertinent medications/nutrition labs reviewed; -224 x 24hr, A1c 8.9%, In house ordered for lispro sliding scale to aid in glucose management.  Attempted to provide CHO diet education, pt declined again. Plan to continue with current diet order and ONS. RD to follow up and will continue to monitor nutrition status.      Factors impacting intake: [ ] none [ ] nausea  [ ] vomiting [ ] diarrhea [ ] constipation  [ ]chewing problems [ ] swallowing issues  [x ] other: recent diet advancement, SBO     Diet Prescription: Diet, Consistent Carbohydrate w/Evening Snack:   Supplement Feeding Modality:  Oral  Ensure Max Cans or Servings Per Day:  1       Frequency:  Daily (23 @ 10:05)    Intake: fair, improving    Daily Weight in k (2023 05:14)  Weight (kg): 79.7 ( @ 16:09)  admt wt in k.7  10% Weight loss in <2 weeks is clinically significant, will continue to monitor as able    Pertinent Medications: MEDICATIONS  (STANDING):  dextrose 5%. 1000 milliLiter(s) (50 mL/Hr) IV Continuous <Continuous>  dextrose 5%. 1000 milliLiter(s) (100 mL/Hr) IV Continuous <Continuous>  dextrose 50% Injectable 25 Gram(s) IV Push once  dextrose 50% Injectable 25 Gram(s) IV Push once  dextrose 50% Injectable 12.5 Gram(s) IV Push once  glucagon  Injectable 1 milliGRAM(s) IntraMuscular once  glucagon  Injectable 1 milliGRAM(s) IntraMuscular once  insulin lispro (ADMELOG) corrective regimen sliding scale   SubCutaneous at bedtime  insulin lispro (ADMELOG) corrective regimen sliding scale   SubCutaneous every 6 hours  insulin lispro (ADMELOG) corrective regimen sliding scale   SubCutaneous three times a day before meals  mesalamine DR Capsule 1200 milliGRAM(s) Oral three times a day  metFORMIN 500 milliGRAM(s) Oral two times a day  pantoprazole    Tablet 40 milliGRAM(s) Oral before breakfast  simethicone 80 milliGRAM(s) Chew three times a day    MEDICATIONS  (PRN):  acetaminophen     Tablet .. 650 milliGRAM(s) Oral every 6 hours PRN Temp greater or equal to 38C (100.4F), Mild Pain (1 - 3), Moderate Pain (4 - 6), Severe Pain (7 - 10)  dextrose Oral Gel 15 Gram(s) Oral once PRN Blood Glucose LESS THAN 70 milliGRAM(s)/deciliter  melatonin 3 milliGRAM(s) Oral at bedtime PRN Insomnia  ondansetron Injectable 4 milliGRAM(s) IV Push every 8 hours PRN Nausea and/or Vomiting    Pertinent Labs:  Na137 mmol/L Glu 232 mg/dL<H> K+ 4.4 mmol/L Cr  0.51 mg/dL BUN 7 mg/dL  Phos 2.4 mg/dL<L>  Alb 1.6 g/dL<L>     CAPILLARY BLOOD GLUCOSE      POCT Blood Glucose.: 203 mg/dL (2023 07:59)  POCT Blood Glucose.: 171 mg/dL (2023 21:39)  POCT Blood Glucose.: 224 mg/dL (2023 16:42)  POCT Blood Glucose.: 185 mg/dL (2023 12:04)      No edema or pressure injury documented per flowsheets.       Estimated Needs:   [x ] no change since previous assessment  [ ] recalculated:     Previous Nutrition Diagnosis:   [ ] Inadequate Energy Intake [x ]Inadequate Oral Intake [ ] Excessive Energy Intake   [ ] Underweight [ ] Increased Nutrient Needs [ ] Overweight/Obesity [ ] Altered Nutrition related lab values  [ ] Altered GI Function [ ] Unintended Weight Loss [ ] Food & Nutrition Related Knowledge Deficit [ ] Malnutrition     Nutrition Diagnosis is [ x] ongoing  [ ] resolved [ ] not applicable     New Nutrition Diagnosis: [x ] acute moderate malnutrition related to inability to consume sufficient protein/energy secondary to acute illness/altered GI function as evidenced by consuming <75% of meals in 7 days; >5% wt loss in 2 weeks      Interventions: Attempted to provide CHO diet education, pt declined again. Plan to continue with current diet order and ONS.  Recommend  [ ] Change Diet To:  [ ] Nutrition Supplement  [ ] Nutrition Support  [ ] Other:     Monitoring and Evaluation:   [X ] Intake [ x ] Tolerance to diet prescription [ x ] weights [ x ] labs[ x ] follow up per protocol  [ ] other:

## 2023-07-27 NOTE — PROGRESS NOTE ADULT - SUBJECTIVE AND OBJECTIVE BOX
Patient is a 48y old  Male who presents with a chief complaint of abd pain (24 Jul 2023 10:54)    Patient seen in follow up for hyponatremia.        PAST MEDICAL HISTORY:  DM (diabetes mellitus)      MEDICATIONS  (STANDING):  dextrose 5%. 1000 milliLiter(s) (100 mL/Hr) IV Continuous <Continuous>  dextrose 5%. 1000 milliLiter(s) (50 mL/Hr) IV Continuous <Continuous>  dextrose 50% Injectable 12.5 Gram(s) IV Push once  dextrose 50% Injectable 25 Gram(s) IV Push once  dextrose 50% Injectable 25 Gram(s) IV Push once  glucagon  Injectable 1 milliGRAM(s) IntraMuscular once  glucagon  Injectable 1 milliGRAM(s) IntraMuscular once  insulin lispro (ADMELOG) corrective regimen sliding scale   SubCutaneous at bedtime  insulin lispro (ADMELOG) corrective regimen sliding scale   SubCutaneous every 6 hours  insulin lispro (ADMELOG) corrective regimen sliding scale   SubCutaneous three times a day before meals  mesalamine DR Capsule 1200 milliGRAM(s) Oral three times a day  metFORMIN 500 milliGRAM(s) Oral two times a day  pantoprazole    Tablet 40 milliGRAM(s) Oral before breakfast  simethicone 80 milliGRAM(s) Chew three times a day    MEDICATIONS  (PRN):  acetaminophen     Tablet .. 650 milliGRAM(s) Oral every 6 hours PRN Temp greater or equal to 38C (100.4F), Mild Pain (1 - 3), Moderate Pain (4 - 6), Severe Pain (7 - 10)  dextrose Oral Gel 15 Gram(s) Oral once PRN Blood Glucose LESS THAN 70 milliGRAM(s)/deciliter  melatonin 3 milliGRAM(s) Oral at bedtime PRN Insomnia  ondansetron Injectable 4 milliGRAM(s) IV Push every 8 hours PRN Nausea and/or Vomiting    T(C): 36.6 (07-27-23 @ 05:32), Max: 37.7 (07-26-23 @ 10:46)  HR: 73 (07-27-23 @ 05:32) (73 - 99)  BP: 149/85 (07-27-23 @ 05:32) (105/71 - 149/85)  RR: 17 (07-27-23 @ 05:32)  SpO2: 98% (07-27-23 @ 05:32)  Wt(kg): --  I&O's Detail        PHYSICAL EXAM:  General: No distress  Respiratory: b/l air entry  Cardiovascular: S1 S2  Gastrointestinal: soft  Extremities:  edema                       LABORATORY:                        13.4   12.29 )-----------( 350      ( 26 Jul 2023 08:23 )             37.6     07-27    137  |  104  |  7   ----------------------------<  232<H>  4.4   |  28  |  0.51    Ca    7.9<L>      27 Jul 2023 08:37  Mg     2.1     07-27    TPro  4.7<L>  /  Alb  1.6<L>  /  TBili  0.3  /  DBili  x   /  AST  34  /  ALT  78<H>  /  AlkPhos  95  07-27    Sodium: 137 mmol/L (07-27 @ 08:37)  Sodium: 128 mmol/L (07-26 @ 08:23)    Potassium: 4.4 mmol/L (07-27 @ 08:37)  Potassium: 4.4 mmol/L (07-26 @ 08:23)    Hemoglobin: 13.4 g/dL (07-26 @ 08:23)  Hemoglobin: 13.1 g/dL (07-25 @ 08:02)    Creatinine, Serum 0.51 (07-27 @ 08:37)  Creatinine, Serum 0.36 (07-26 @ 08:23)  Creatinine, Serum 0.36 (07-25 @ 08:02)        LIVER FUNCTIONS - ( 27 Jul 2023 08:37 )  Alb: 1.6 g/dL / Pro: 4.7 g/dL / ALK PHOS: 95 U/L / ALT: 78 U/L DA / AST: 34 U/L / GGT: x           Urinalysis Basic - ( 27 Jul 2023 08:37 )    Color: x / Appearance: x / SG: x / pH: x  Gluc: 232 mg/dL / Ketone: x  / Bili: x / Urobili: x   Blood: x / Protein: x / Nitrite: x   Leuk Esterase: x / RBC: x / WBC x   Sq Epi: x / Non Sq Epi: x / Bacteria: x

## 2023-07-27 NOTE — DISCHARGE NOTE NURSING/CASE MANAGEMENT/SOCIAL WORK - PATIENT PORTAL LINK FT
You can access the FollowMyHealth Patient Portal offered by [facility] by registering at the following website: [email or web address]. By joining [facility]’s FollowMyHealth portal, you will also be able to view your health information using other applications (apps) compatible with our system.

## 2023-07-27 NOTE — PROGRESS NOTE ADULT - REASON FOR ADMISSION
Abdominal Pain
abd pain
Abdominal Pain
Enteritis
Enteritis
Enteritis vs possible partial SBO
abd pain
abdominal pain
Enteritis
abd pain
enteritis
fever nausea vomiting
fever, nausea and vomiting .
Fever , abdominal discomfort

## 2023-07-27 NOTE — PROGRESS NOTE ADULT - SUBJECTIVE AND OBJECTIVE BOX
Patient is a 48y old  Male who presents with a chief complaint of nausea, enteritis (27 Jul 2023 09:37)      INTERVAL History of Present Illness/OVERNIGHT EVENTS: feels good  sodium improved    MEDICATIONS  (STANDING):  dextrose 5%. 1000 milliLiter(s) (100 mL/Hr) IV Continuous <Continuous>  dextrose 5%. 1000 milliLiter(s) (50 mL/Hr) IV Continuous <Continuous>  dextrose 50% Injectable 12.5 Gram(s) IV Push once  dextrose 50% Injectable 25 Gram(s) IV Push once  dextrose 50% Injectable 25 Gram(s) IV Push once  glucagon  Injectable 1 milliGRAM(s) IntraMuscular once  glucagon  Injectable 1 milliGRAM(s) IntraMuscular once  insulin lispro (ADMELOG) corrective regimen sliding scale   SubCutaneous three times a day before meals  insulin lispro (ADMELOG) corrective regimen sliding scale   SubCutaneous at bedtime  insulin lispro (ADMELOG) corrective regimen sliding scale   SubCutaneous every 6 hours  mesalamine DR Capsule 1200 milliGRAM(s) Oral three times a day  metFORMIN 500 milliGRAM(s) Oral two times a day  pantoprazole    Tablet 40 milliGRAM(s) Oral before breakfast  simethicone 80 milliGRAM(s) Chew three times a day    MEDICATIONS  (PRN):  acetaminophen     Tablet .. 650 milliGRAM(s) Oral every 6 hours PRN Temp greater or equal to 38C (100.4F), Mild Pain (1 - 3), Moderate Pain (4 - 6), Severe Pain (7 - 10)  dextrose Oral Gel 15 Gram(s) Oral once PRN Blood Glucose LESS THAN 70 milliGRAM(s)/deciliter  melatonin 3 milliGRAM(s) Oral at bedtime PRN Insomnia  ondansetron Injectable 4 milliGRAM(s) IV Push every 8 hours PRN Nausea and/or Vomiting      Allergies    No Known Allergies    Intolerances        REVIEW OF SYSTEMS:  Other systems currently negative unless otherwise specified above.    Vital Signs Last 24 Hrs  T(C): 36.6 (27 Jul 2023 05:32), Max: 37.7 (26 Jul 2023 10:46)  T(F): 97.8 (27 Jul 2023 05:32), Max: 99.9 (26 Jul 2023 10:46)  HR: 73 (27 Jul 2023 05:32) (73 - 99)  BP: 149/85 (27 Jul 2023 05:32) (105/71 - 149/85)  BP(mean): --  RR: 17 (27 Jul 2023 05:32) (16 - 18)  SpO2: 98% (27 Jul 2023 05:32) (97% - 98%)    Parameters below as of 27 Jul 2023 05:32  Patient On (Oxygen Delivery Method): room air            PHYSICAL EXAM:  GENERAL: No apparent distress, appears stated age  EYES: Conjunctiva and sclera clear, no discharge  ENMT: Moist mucous membranes, no nasal discharge  CHEST/LUNG: Clear to auscultation bilaterally, no wheeze or rales  HEART: Regular rhythm, no rubs or gallops  ABDOMEN: Soft, Nontender, Nondistended  EXTREMITIES:  No clubbing, cyanosis but trace pedal edema      LABS:    27 Jul 2023 08:37    137    |  104    |  7      ----------------------------<  232    4.4     |  28     |  0.51     Ca    7.9        27 Jul 2023 08:37  Mg     2.1       27 Jul 2023 08:37    TPro  4.7    /  Alb  1.6    /  TBili  0.3    /  DBili  x      /  AST  34     /  ALT  78     /  AlkPhos  95     27 Jul 2023 08:37      Urinalysis Basic - ( 27 Jul 2023 08:37 )    Color: x / Appearance: x / SG: x / pH: x  Gluc: 232 mg/dL / Ketone: x  / Bili: x / Urobili: x   Blood: x / Protein: x / Nitrite: x   Leuk Esterase: x / RBC: x / WBC x   Sq Epi: x / Non Sq Epi: x / Bacteria: x      CAPILLARY BLOOD GLUCOSE      POCT Blood Glucose.: 203 mg/dL (27 Jul 2023 07:59)  POCT Blood Glucose.: 171 mg/dL (26 Jul 2023 21:39)  POCT Blood Glucose.: 224 mg/dL (26 Jul 2023 16:42)  POCT Blood Glucose.: 185 mg/dL (26 Jul 2023 12:04)        RADIOLOGY & ADDITIONAL TESTS:      Images reviewed personally    Consultant Notes Reviewed and Care Discussed with relevant Consultants.

## 2023-07-27 NOTE — PROGRESS NOTE ADULT - ASSESSMENT
Hyponatremia: SIADH, low solute intake  SBO, Enteritis  Diabetes  Hypophosphatemia    07/26/23: No improvement in sodium levels. Samsca x 1 dose. Monitor blood sugar levels. Insulin coverage as needed.   Will follow electrolytes and renal function trend. GI follow up.   07/27/23: Improved sodium levels. To continue current meds. D/c planning.

## 2023-07-27 NOTE — PROGRESS NOTE ADULT - ASSESSMENT
48M with PMHx of T2DM presenting with hyperglycemia (not DKA), abdominal pain, and nausea. CT A&P showing possible enteritis or partial SBO.    #Enteritis vs. Partial SBO?  - Surgery following and may be diabetic in nature or infectious  - CT enterography on 7/21: "Generalized, continuous mural hyperenhancement/small bowel wall thickening compatible with nonspecific small bowel inflammation. Moderate dilated small bowel with gradual transition to normal loops distally, which may be associated with partial bowel obstruction as previously noted."  - Finished a 7 day course of Zosyn on 7/22  - GI following & ASCA and ANCA sent  - GI started Mesalamine on 7/22  - f/up updated GI recs - 2-week Mesalamine on discharge    #Hyponatremia, resolved  - f/up renal consult  - fluid restriction  - Is/Os    #T2DM  - FS TID AC & insulin sliding scale  - A1c is 8.9 indicating he likely needs dual therapy    #Hypoalbuminemia  - ?GI vs  protein loss  - Nutrition consulted      DC home today  d/w Dr. Buenrostro  PCP updated  Oral diabetic meds with PCP/Endocrine follow up.  spent 40 mins

## 2023-07-27 NOTE — PROGRESS NOTE ADULT - SUBJECTIVE AND OBJECTIVE BOX
INTERVAL HPI/OVERNIGHT EVENTS:  No new overnight event.  No N/V/D.  Tolerating diet.   MEDICATIONS  (STANDING):  dextrose 5%. 1000 milliLiter(s) (100 mL/Hr) IV Continuous <Continuous>  dextrose 5%. 1000 milliLiter(s) (50 mL/Hr) IV Continuous <Continuous>  dextrose 50% Injectable 12.5 Gram(s) IV Push once  dextrose 50% Injectable 25 Gram(s) IV Push once  dextrose 50% Injectable 25 Gram(s) IV Push once  glucagon  Injectable 1 milliGRAM(s) IntraMuscular once  glucagon  Injectable 1 milliGRAM(s) IntraMuscular once  insulin lispro (ADMELOG) corrective regimen sliding scale   SubCutaneous at bedtime  insulin lispro (ADMELOG) corrective regimen sliding scale   SubCutaneous three times a day before meals  insulin lispro (ADMELOG) corrective regimen sliding scale   SubCutaneous every 6 hours  mesalamine DR Capsule 1200 milliGRAM(s) Oral three times a day  metFORMIN 500 milliGRAM(s) Oral two times a day  pantoprazole    Tablet 40 milliGRAM(s) Oral before breakfast  simethicone 80 milliGRAM(s) Chew three times a day    MEDICATIONS  (PRN):  acetaminophen     Tablet .. 650 milliGRAM(s) Oral every 6 hours PRN Temp greater or equal to 38C (100.4F), Mild Pain (1 - 3), Moderate Pain (4 - 6), Severe Pain (7 - 10)  dextrose Oral Gel 15 Gram(s) Oral once PRN Blood Glucose LESS THAN 70 milliGRAM(s)/deciliter  melatonin 3 milliGRAM(s) Oral at bedtime PRN Insomnia  ondansetron Injectable 4 milliGRAM(s) IV Push every 8 hours PRN Nausea and/or Vomiting      Allergies    No Known Allergies    Intolerances        Review of Systems:    General:  No wt loss, fevers, chills, night sweats,fatigue,   Eyes:  Good vision, no reported pain  ENT:  No sore throat, pain, runny nose, dysphagia  CV:  No pain, palpitatioins, hypo/hypertension  Resp:  No dyspnea, cough, tachypnea, wheezing  GI:  No pain, No nausea, No vomiting, No diarrhea, No constipatiion, No weight loss, No fever, No pruritis, No rectal bleeding, No tarry stools, No dysphagia,  :  No pain, bleeding, incontinence, nocturia  Muscle:  No pain, weakness  Neuro:  No weakness, tingling, memory problems  Psych:  No fatigue, insomnia, mood problems, depression  Endocrine:  No polyuria, polydypsia, cold/heat intolerance  Heme:  No petechiae, ecchymosis, easy bruisability  Skin:  No rash, tattoos, scars, edema      Vital Signs Last 24 Hrs  T(C): 36.3 (27 Jul 2023 13:03), Max: 36.9 (26 Jul 2023 22:03)  T(F): 97.3 (27 Jul 2023 13:03), Max: 98.4 (26 Jul 2023 22:03)  HR: 88 (27 Jul 2023 13:03) (73 - 99)  BP: 114/75 (27 Jul 2023 13:03) (114/75 - 149/85)  BP(mean): --  RR: 17 (27 Jul 2023 05:32) (16 - 17)  SpO2: 97% (27 Jul 2023 13:03) (97% - 98%)    Parameters below as of 27 Jul 2023 13:03  Patient On (Oxygen Delivery Method): room air        PHYSICAL EXAM:    Constitutional: NAD, well-developed  HEENT: EOMI, throat clear  Neck: No LAD, supple  Respiratory: CTA and P  Cardiovascular: S1 and S2, RRR, no M  Gastrointestinal: BS+, soft, NT/ND, neg HSM,  Extremities: No peripheral edema, neg clubing, cyanosis  Vascular: 2+ peripheral pulses  Neurological: A/O x 3, no focal deficits  Psychiatric: Normal mood, normal affect  Skin: No rashes      LABS:                        13.4   12.29 )-----------( 350      ( 26 Jul 2023 08:23 )             37.6     07-27    137  |  104  |  7   ----------------------------<  232<H>  4.4   |  28  |  0.51    Ca    7.9<L>      27 Jul 2023 08:37  Mg     2.1     07-27    TPro  4.7<L>  /  Alb  1.6<L>  /  TBili  0.3  /  DBili  x   /  AST  34  /  ALT  78<H>  /  AlkPhos  95  07-27      Urinalysis Basic - ( 27 Jul 2023 08:37 )    Color: x / Appearance: x / SG: x / pH: x  Gluc: 232 mg/dL / Ketone: x  / Bili: x / Urobili: x   Blood: x / Protein: x / Nitrite: x   Leuk Esterase: x / RBC: x / WBC x   Sq Epi: x / Non Sq Epi: x / Bacteria: x        RADIOLOGY & ADDITIONAL TESTS:

## 2023-07-27 NOTE — DISCHARGE NOTE PROVIDER - HOSPITAL COURSE
47 yo M with PMH of IDDM who presented to ED yesterday with complaint of elevated glucose and nausea. He reported abdominal pain, fever, and an episode of vomiting. He reports he stopped his insulin 2 weeks ago as he says his sugars were under control and no longer needed it. He had a negative workup for DKA and was given oral potassium and was discharged after feeling better with instructions to resume his insulin and followup with his PCP. He returned today   returned today with reports of fever and worsening abdominal pain. Denies diarrhea. He reports he is a cigar smoker.     ED course: CBC shows bandemia increased from 18 to 25. CT showed enteritis and unable to rule out partial SBO. findings d/w Dr. Correa who advised NG tube and admission for further testing. Patient received zosyn and GI Dr. Schafer made aware.        PMH: IDDM  PSH: denies abdominal surgeries  Ax: NKDA  PCP: Denae Hernandez    GI recommendations followed - Mesalamine for 2 weeks with outpatient follow up.  Seen by Nephrology for hyponatremia - fluid restriction; sodium improved.  No surgical intervention.    Close PCP and GI follow up outpatient. 49 yo M with PMH of IDDM who presented to ED yesterday with complaint of elevated glucose and nausea. He reported abdominal pain, fever, and an episode of vomiting. He reports he stopped his insulin 2 weeks ago as he says his sugars were under control and no longer needed it. He had a negative workup for DKA and was given oral potassium and was discharged after feeling better with instructions to resume his insulin and followup with his PCP. He returned today   returned today with reports of fever and worsening abdominal pain. Denies diarrhea. He reports he is a cigar smoker.     ED course: CBC shows bandemia increased from 18 to 25. CT showed enteritis and unable to rule out partial SBO. findings d/w Dr. Correa who advised NG tube and admission for further testing. Patient received zosyn and GI Dr. Schafer made aware.        PMH: IDDM  PSH: denies abdominal surgeries  Ax: NKDA  PCP: Denae Hernandez    GI recommendations followed - Mesalamine for 2 weeks with outpatient follow up.  Finished 7-day abx course.  Seen by Nephrology for hyponatremia - fluid restriction; sodium improved.  No surgical intervention.  Outpatient endocrine follow up.  Metformin and Linagliptin on discharge.    Close PCP and GI follow up outpatient.  PCP updated on plan of care 7/27.

## 2023-07-27 NOTE — DIETITIAN NUTRITION RISK NOTIFICATION - TREATMENT: THE FOLLOWING DIET HAS BEEN RECOMMENDED
Diet, Consistent Carbohydrate w/Evening Snack:   Supplement Feeding Modality:  Oral  Ensure Max Cans or Servings Per Day:  1       Frequency:  Daily (07-26-23 @ 10:05) [Active]

## 2023-07-27 NOTE — DISCHARGE NOTE PROVIDER - NSDCMRMEDTOKEN_GEN_ALL_CORE_FT
mesalamine 400 mg oral delayed release capsule: 3 cap(s) orally 3 times a day   linagliptin 5 mg oral tablet: 1 tab(s) orally once a day  mesalamine 400 mg oral delayed release capsule: 3 cap(s) orally 3 times a day  metFORMIN 500 mg oral tablet: 1 tab(s) orally 2 times a day  pantoprazole 40 mg oral delayed release tablet: 1 tab(s) orally once a day (before a meal)

## 2023-07-27 NOTE — DISCHARGE NOTE NURSING/CASE MANAGEMENT/SOCIAL WORK - NSDCPEFALRISK_GEN_ALL_CORE
For information on Fall & Injury Prevention, visit: [email or web address] OR  [email or web address] OR  https://www.cdc.gov/steadi/patient.html

## 2023-07-27 NOTE — DISCHARGE NOTE PROVIDER - CARE PROVIDER_API CALL
Martinez Schafer  Gastroenterology  [address] [place] [address]  Phone: [phone]  Fax: [phone]  Follow Up Time: 1 week    Frederic Buenrostro  Nephrology  [address], [address]  [place] [address]  Phone: [phone]  Fax: [phone]  Follow Up Time: 1 week    JULIA WOMACK  [address] [place] [address]  Phone: [phone]  Fax: [phone]  Follow Up Time: 1-3 days   Martinez Schafer  Gastroenterology  [address] [place] [address]  Phone: [phone]  Fax: [phone]  Follow Up Time: 1 week    Frederic Buenrostro  Nephrology  [address], [address]  [place] [address]  Phone: [phone]  Fax: [phone]  Follow Up Time: 1 week    JULIA WOMACK  [address] [place] [address]  Phone: [phone]  Fax: [phone]  Follow Up Time: 1-3 days    Perlman, Craig Douglas  Internal Medicine  [address] [place] [address], [address]  [place] [address]  Phone: [phone]  Fax: [phone]  Follow Up Time: 1-3 days

## 2023-07-27 NOTE — PROGRESS NOTE ADULT - PROVIDER SPECIALTY LIST ADULT
Gastroenterology
Gastroenterology
Hospitalist
Hospitalist
Infectious Disease
Internal Medicine
Nephrology
Surgery
Gastroenterology
Hospitalist
Infectious Disease
Internal Medicine
Nephrology
Nephrology
Surgery
Gastroenterology
Gastroenterology
Hospitalist
Hospitalist
Infectious Disease
Infectious Disease
Surgery
Surgery
Hospitalist
Internal Medicine
Surgery

## 2024-05-10 NOTE — ED PROVIDER NOTE - NSCAREINITIATED _GEN_ER
Shawn Whitney(PA) Orientation to room/Bed in low position, brakes on/Side rails x 2 or 4 up, assess large gaps, such that a patient could get extremity or other body part entrapped, use additional safety procedures/Assess eliminations need, assist as needed/Call light is within reach, educate patient/family on its functionality/Environment clear of unused equipment, furniture's in place, clear of hazards/Assess for adequate lighting, leave nightlight on/Patient and family education available to parents and patient/Document fall prevention teaching and include in plan of care

## 2024-05-30 NOTE — ED PROVIDER NOTE - NS ED MD DISPO DISCHARGE CCDA
[Normal] : normal gait, coordination grossly intact, no focal deficits and deep tendon reflexes were 2+ and symmetric [de-identified] : Erythematous skin from scalp to neck area due to scratching.  Patient/Caregiver provided printed discharge information.

## 2024-12-24 NOTE — DIETITIAN INITIAL EVALUATION ADULT - PERTINENT LABORATORY DATA
Resident 07-16    x   |  x   |  x   ----------------------------<  x   4.5   |  x   |  x     Ca    7.4<L>      16 Jul 2023 06:21  Mg     1.8     07-16    TPro  6.5  /  Alb  2.9<L>  /  TBili  1.3<H>  /  DBili  x   /  AST  36  /  ALT  31  /  AlkPhos  34  07-15  POCT Blood Glucose.: 141 mg/dL (07-16-23 @ 12:02)  A1C with Estimated Average Glucose Result: 8.9 % (07-16-23 @ 06:21)

## 2025-07-10 NOTE — PROVIDER CONTACT NOTE (CRITICAL VALUE NOTIFICATION) - DATE AND TIME:
Patient returned call. Advised to contact rheum for refill, pt stated understanding.    19-Jul-2023 09:14